# Patient Record
Sex: MALE | Race: WHITE | NOT HISPANIC OR LATINO | Employment: OTHER
[De-identification: names, ages, dates, MRNs, and addresses within clinical notes are randomized per-mention and may not be internally consistent; named-entity substitution may affect disease eponyms.]

---

## 2021-09-23 ENCOUNTER — TRANSCRIBE ORDERS (OUTPATIENT)
Dept: OTHER | Age: 76
End: 2021-09-23

## 2021-09-23 DIAGNOSIS — G93.0 SUBARACHNOID CYST: Primary | ICD-10-CM

## 2021-09-28 ENCOUNTER — TELEPHONE (OUTPATIENT)
Dept: NEUROSURGERY | Facility: CLINIC | Age: 76
End: 2021-09-28

## 2021-09-28 NOTE — TELEPHONE ENCOUNTER
[facility] Call Center    Phone Message    May a detailed message be left on voicemail: yes     Reason for Call: Appointment Intake    [facility]  Diagnosis and/or Symptoms: Subarachnoid cyst [G93.0]    Laureano Srivastava's nurse is following up on referral to see if its scheduled. Laureano states that images were pushed through to system and records were faxed to clinic.   Please contact patient for scheduling at [phone]    Action Taken: Message routed to:  [facility] ([facility]    Travel Screening: Not Applicable

## 2021-09-29 NOTE — TELEPHONE ENCOUNTER
Writer LEW for pt to call back and schedule a visit.    Please schedule a new, in person visit, with Dr. Malin or Dr. Luna for next available     Nieves damian

## 2021-09-30 ENCOUNTER — PRE VISIT (OUTPATIENT)
Dept: NEUROSURGERY | Facility: CLINIC | Age: 76
End: 2021-09-30

## 2021-09-30 NOTE — TELEPHONE ENCOUNTER
Action 9/30/21 HK 10:00AM   Action Taken Writer faxed request to [facility] at [phone] for all imaging and neuro records    Writer faxed request to [facility] at [phone]  requesting recent Mri and CT of head       Action 10.18.21 HK 8:00AM   Action Taken Writer confirmed imaging from [facility] has been pushed. Writer faxed second request to [facility] at [phone] requesting recent head CT

## 2021-10-26 ENCOUNTER — APPOINTMENT (OUTPATIENT)
Dept: MRI IMAGING | Facility: CLINIC | Age: 76
DRG: 065 | End: 2021-10-26
Attending: EMERGENCY MEDICINE
Payer: MEDICARE

## 2021-10-26 ENCOUNTER — APPOINTMENT (OUTPATIENT)
Dept: ULTRASOUND IMAGING | Facility: CLINIC | Age: 76
DRG: 065 | End: 2021-10-26
Attending: EMERGENCY MEDICINE
Payer: MEDICARE

## 2021-10-26 ENCOUNTER — HOSPITAL ENCOUNTER (INPATIENT)
Facility: CLINIC | Age: 76
LOS: 2 days | Discharge: NURSING, REHAB OR LONG-TERM CARE | DRG: 065 | End: 2021-10-29
Attending: EMERGENCY MEDICINE | Admitting: PSYCHIATRY & NEUROLOGY
Payer: MEDICARE

## 2021-10-26 ENCOUNTER — OFFICE VISIT (OUTPATIENT)
Dept: NEUROSURGERY | Facility: CLINIC | Age: 76
DRG: 065 | End: 2021-10-26
Attending: FAMILY MEDICINE
Payer: MEDICARE

## 2021-10-26 VITALS
WEIGHT: 189.15 LBS | HEIGHT: 69 IN | DIASTOLIC BLOOD PRESSURE: 94 MMHG | SYSTOLIC BLOOD PRESSURE: 147 MMHG | BODY MASS INDEX: 28.02 KG/M2 | RESPIRATION RATE: 24 BRPM | HEART RATE: 104 BPM | TEMPERATURE: 97.8 F

## 2021-10-26 DIAGNOSIS — R26.9 GAIT DIFFICULTY: ICD-10-CM

## 2021-10-26 DIAGNOSIS — G93.0 SUBARACHNOID CYST: ICD-10-CM

## 2021-10-26 DIAGNOSIS — R53.1 ACUTE WEAKNESS: Primary | ICD-10-CM

## 2021-10-26 DIAGNOSIS — I63.9 CEREBROVASCULAR ACCIDENT (CVA), UNSPECIFIED MECHANISM (H): Primary | ICD-10-CM

## 2021-10-26 DIAGNOSIS — R53.1 WEAKNESS: ICD-10-CM

## 2021-10-26 DIAGNOSIS — Z11.52 ENCOUNTER FOR SCREENING LABORATORY TESTING FOR SEVERE ACUTE RESPIRATORY SYNDROME CORONAVIRUS 2 (SARS-COV-2): ICD-10-CM

## 2021-10-26 LAB
ALBUMIN UR-MCNC: 10 MG/DL
ANION GAP SERPL CALCULATED.3IONS-SCNC: 6 MMOL/L (ref 3–14)
APPEARANCE UR: CLEAR
BACTERIA #/AREA URNS HPF: ABNORMAL /HPF
BASOPHILS # BLD AUTO: 0.1 10E3/UL (ref 0–0.2)
BASOPHILS NFR BLD AUTO: 1 %
BILIRUB UR QL STRIP: NEGATIVE
BUN SERPL-MCNC: 16 MG/DL (ref 7–30)
CALCIUM SERPL-MCNC: 9 MG/DL (ref 8.5–10.1)
CHLORIDE BLD-SCNC: 105 MMOL/L (ref 94–109)
CO2 SERPL-SCNC: 26 MMOL/L (ref 20–32)
COLOR UR AUTO: ABNORMAL
CREAT SERPL-MCNC: 0.86 MG/DL (ref 0.66–1.25)
EOSINOPHIL # BLD AUTO: 0.6 10E3/UL (ref 0–0.7)
EOSINOPHIL NFR BLD AUTO: 7 %
ERYTHROCYTE [DISTWIDTH] IN BLOOD BY AUTOMATED COUNT: 12.2 % (ref 10–15)
GFR SERPL CREATININE-BSD FRML MDRD: 83 ML/MIN/1.73M2
GLUCOSE BLD-MCNC: 76 MG/DL (ref 70–99)
GLUCOSE UR STRIP-MCNC: 500 MG/DL
HCT VFR BLD AUTO: 46.5 % (ref 40–53)
HGB BLD-MCNC: 15.7 G/DL (ref 13.3–17.7)
HGB UR QL STRIP: NEGATIVE
HOLD SPECIMEN: NORMAL
IMM GRANULOCYTES # BLD: 0 10E3/UL
IMM GRANULOCYTES NFR BLD: 0 %
KETONES UR STRIP-MCNC: ABNORMAL MG/DL
LEUKOCYTE ESTERASE UR QL STRIP: NEGATIVE
LYMPHOCYTES # BLD AUTO: 2.1 10E3/UL (ref 0.8–5.3)
LYMPHOCYTES NFR BLD AUTO: 25 %
MCH RBC QN AUTO: 30.9 PG (ref 26.5–33)
MCHC RBC AUTO-ENTMCNC: 33.8 G/DL (ref 31.5–36.5)
MCV RBC AUTO: 92 FL (ref 78–100)
MONOCYTES # BLD AUTO: 0.9 10E3/UL (ref 0–1.3)
MONOCYTES NFR BLD AUTO: 11 %
MUCOUS THREADS #/AREA URNS LPF: PRESENT /LPF
NEUTROPHILS # BLD AUTO: 4.7 10E3/UL (ref 1.6–8.3)
NEUTROPHILS NFR BLD AUTO: 56 %
NITRATE UR QL: NEGATIVE
NRBC # BLD AUTO: 0 10E3/UL
NRBC BLD AUTO-RTO: 0 /100
PH UR STRIP: 5 [PH] (ref 5–7)
PLATELET # BLD AUTO: 169 10E3/UL (ref 150–450)
POTASSIUM BLD-SCNC: 4.1 MMOL/L (ref 3.4–5.3)
RBC # BLD AUTO: 5.08 10E6/UL (ref 4.4–5.9)
RBC URINE: <1 /HPF
SODIUM SERPL-SCNC: 137 MMOL/L (ref 133–144)
SP GR UR STRIP: 1.02 (ref 1–1.03)
UROBILINOGEN UR STRIP-MCNC: NORMAL MG/DL
WBC # BLD AUTO: 8.4 10E3/UL (ref 4–11)
WBC URINE: 1 /HPF

## 2021-10-26 PROCEDURE — 72146 MRI CHEST SPINE W/O DYE: CPT | Mod: MG

## 2021-10-26 PROCEDURE — 84484 ASSAY OF TROPONIN QUANT: CPT | Performed by: STUDENT IN AN ORGANIZED HEALTH CARE EDUCATION/TRAINING PROGRAM

## 2021-10-26 PROCEDURE — 81001 URINALYSIS AUTO W/SCOPE: CPT | Performed by: EMERGENCY MEDICINE

## 2021-10-26 PROCEDURE — 85025 COMPLETE CBC W/AUTO DIFF WBC: CPT | Performed by: EMERGENCY MEDICINE

## 2021-10-26 PROCEDURE — 82248 BILIRUBIN DIRECT: CPT | Performed by: STUDENT IN AN ORGANIZED HEALTH CARE EDUCATION/TRAINING PROGRAM

## 2021-10-26 PROCEDURE — 93922 UPR/L XTREMITY ART 2 LEVELS: CPT | Mod: 26 | Performed by: RADIOLOGY

## 2021-10-26 PROCEDURE — 96372 THER/PROPH/DIAG INJ SC/IM: CPT

## 2021-10-26 PROCEDURE — 72141 MRI NECK SPINE W/O DYE: CPT

## 2021-10-26 PROCEDURE — C9803 HOPD COVID-19 SPEC COLLECT: HCPCS | Performed by: EMERGENCY MEDICINE

## 2021-10-26 PROCEDURE — 72148 MRI LUMBAR SPINE W/O DYE: CPT

## 2021-10-26 PROCEDURE — G1004 CDSM NDSC: HCPCS | Mod: GC | Performed by: STUDENT IN AN ORGANIZED HEALTH CARE EDUCATION/TRAINING PROGRAM

## 2021-10-26 PROCEDURE — 84155 ASSAY OF PROTEIN SERUM: CPT

## 2021-10-26 PROCEDURE — 80061 LIPID PANEL: CPT | Performed by: STUDENT IN AN ORGANIZED HEALTH CARE EDUCATION/TRAINING PROGRAM

## 2021-10-26 PROCEDURE — 72146 MRI CHEST SPINE W/O DYE: CPT | Mod: 26 | Performed by: STUDENT IN AN ORGANIZED HEALTH CARE EDUCATION/TRAINING PROGRAM

## 2021-10-26 PROCEDURE — 72148 MRI LUMBAR SPINE W/O DYE: CPT | Mod: 26 | Performed by: STUDENT IN AN ORGANIZED HEALTH CARE EDUCATION/TRAINING PROGRAM

## 2021-10-26 PROCEDURE — 36415 COLL VENOUS BLD VENIPUNCTURE: CPT | Performed by: EMERGENCY MEDICINE

## 2021-10-26 PROCEDURE — 93922 UPR/L XTREMITY ART 2 LEVELS: CPT

## 2021-10-26 PROCEDURE — 72141 MRI NECK SPINE W/O DYE: CPT | Mod: 26 | Performed by: STUDENT IN AN ORGANIZED HEALTH CARE EDUCATION/TRAINING PROGRAM

## 2021-10-26 PROCEDURE — G0463 HOSPITAL OUTPT CLINIC VISIT: HCPCS

## 2021-10-26 PROCEDURE — 80048 BASIC METABOLIC PNL TOTAL CA: CPT | Performed by: EMERGENCY MEDICINE

## 2021-10-26 PROCEDURE — 84165 PROTEIN E-PHORESIS SERUM: CPT | Mod: TC

## 2021-10-26 PROCEDURE — 99204 OFFICE O/P NEW MOD 45 MIN: CPT | Performed by: NEUROLOGICAL SURGERY

## 2021-10-26 PROCEDURE — 83036 HEMOGLOBIN GLYCOSYLATED A1C: CPT | Performed by: STUDENT IN AN ORGANIZED HEALTH CARE EDUCATION/TRAINING PROGRAM

## 2021-10-26 PROCEDURE — 83735 ASSAY OF MAGNESIUM: CPT | Performed by: STUDENT IN AN ORGANIZED HEALTH CARE EDUCATION/TRAINING PROGRAM

## 2021-10-26 PROCEDURE — 99285 EMERGENCY DEPT VISIT HI MDM: CPT | Mod: 25 | Performed by: EMERGENCY MEDICINE

## 2021-10-26 PROCEDURE — G1004 CDSM NDSC: HCPCS

## 2021-10-26 RX ORDER — ATORVASTATIN CALCIUM 10 MG/1
10 TABLET, FILM COATED ORAL AT BEDTIME
Status: ON HOLD | COMMUNITY
End: 2021-10-29

## 2021-10-26 RX ORDER — ASPIRIN 81 MG/1
81 TABLET, CHEWABLE ORAL DAILY
Status: ON HOLD | COMMUNITY
End: 2021-10-29

## 2021-10-26 ASSESSMENT — ENCOUNTER SYMPTOMS
FEVER: 0
WEAKNESS: 1
HEADACHES: 0
SHORTNESS OF BREATH: 0
EYE REDNESS: 0
CONFUSION: 0
NUMBNESS: 0
ABDOMINAL PAIN: 0
DIFFICULTY URINATING: 0
COLOR CHANGE: 0
ARTHRALGIAS: 0
NECK STIFFNESS: 0

## 2021-10-26 ASSESSMENT — PAIN SCALES - GENERAL: PAINLEVEL: NO PAIN (0)

## 2021-10-26 ASSESSMENT — MIFFLIN-ST. JEOR: SCORE: 1560.5

## 2021-10-26 NOTE — CONSULTS
[facility]       NEUROSURGERY CONSULTATION NOTE    This consultation was requested by Dr. Gutierrez from the ED service.    Reason for Consultation: Lower extremity weakness    HPI: Jose Mallory is a 76 year old right-handed male with a PMH of DMII, on ASA 81mg (last took yesterday night), who presents with a 1 year history of progressive gait difficulty, acutely worsened in the past 2 months. He describes difficulty with coordination with ambulation for the past year, which required use of a cane. This has progressed in the past 2 months with several falls and he is now using a walker. He presented to Neurosurgery clinic with Dr. Luna today in a wheelchair, for assessment of posterior fossa arachnoid cyst. He was sent to the ED to obtain further spine imaging given his progressive weakness with ambulation. He endorses chronic low back pain that has not changed in intensity recently. He denies any shooting pain down his legs, denies any numbness. He denies any urinary or stool incontinence or saddle anesthesia. He does endorse erectile dysfunction for the past 2 years. He describes his feet feel cold for the past year.       PAST MEDICAL HISTORY: No past medical history on file.    PAST SURGICAL HISTORY: No past surgical history on file.    FAMILY HISTORY: No family history on file.    SOCIAL HISTORY:   Social History     Tobacco Use     Smoking status: Never Smoker     Smokeless tobacco: Never Used   Substance Use Topics     Alcohol use: Not on file       MEDICATIONS:  (Not in a hospital admission)      Allergies:  Allergies   Allergen Reactions     Rocephin [Ceftriaxone]      Passing out.       ROS: 10 point ROS of systems including Constitutional, Eyes, Respiratory, Cardiovascular, Gastroenterology, Genitourinary, Integumentary, Muscularskeletal, Psychiatric were all negative except for pertinent positives noted in my HPI.    Physical exam:   Blood pressure  (!) 141/91, pulse 94, temperature 97.9  F (36.6  C), temperature source Oral, resp. rate 18, SpO2 96 %.  CV: BP and HR as noted above  PULM: breathing comfortably on room air  ABD: soft  NEUROLOGIC:  -- Awake; Alert; oriented x 3  -- Follows commands briskly  -- +repetition, calculation, and naming  -- Speech fluent, spontaneous. No aphasia or dysarthria.  -- no gaze preference. No apparent hemineglect.  Cranial Nerves:  -- visual fields full to confrontation, PERRL 3-2mm bilat and brisk, extraocular movements intact  -- face symmetrical, tongue midline  -- sensory V1-V3 intact bilaterally  -- palate elevates symmetrically, uvula midline  -- hearing grossly intact bilat  -- Trapezii 5/5 strength bilat symmetric  -- Cerebellar: Finger nose finger without dysmetria, intact rapid alternating motions bilaterally    Motor:  Normal bulk / tone; no tremor, rigidity, or bradykinesia.  No muscle wasting or fasciculations  No Pronator Drift     Delt Bi Tri Hand Flexion/  Extension Iliopsoas Quadriceps Hamstrings Tibialis Anterior Gastroc    C5 C6 C7 C8/T1 L2 L3 L4-S1 L4 S1   R 5 5 5 5 5 5 5 5 5   L 5 5 5 5 5 5 5 5 5   Sensory:  intact to LT x 4 extremities     Reflexes: No clonus     Bi Tri BR Alex Pat Ach Bab    C5-6 C7-8 C6 UMN L2-4 S1 UMN   R 2+ 2+ 2+ Norm 0* 2+ Norm   L 2+ 2+ 2+ Norm 0* 2+ Norm   * Absent reflexes in the setting of bilateral knee replacement    Gait: Deferred.       LABS:  Last Comprehensive Metabolic Panel:  Sodium   Date Value Ref Range Status   10/26/2021 137 133 - 144 mmol/L Final     Potassium   Date Value Ref Range Status   10/26/2021 4.1 3.4 - 5.3 mmol/L Final     Chloride   Date Value Ref Range Status   10/26/2021 105 94 - 109 mmol/L Final     Carbon Dioxide (CO2)   Date Value Ref Range Status   10/26/2021 26 20 - 32 mmol/L Final     Anion Gap   Date Value Ref Range Status   10/26/2021 6 3 - 14 mmol/L Final     Glucose   Date Value Ref Range Status   10/26/2021 76 70 - 99 mg/dL Final     Urea  Nitrogen   Date Value Ref Range Status   10/26/2021 16 7 - 30 mg/dL Final     Creatinine   Date Value Ref Range Status   10/26/2021 0.86 0.66 - 1.25 mg/dL Final     GFR Estimate   Date Value Ref Range Status   10/26/2021 83 >60 mL/min/1.73m2 Final     Comment:     As of July 11, 2021, eGFR is calculated by the CKD-EPI creatinine equation, without race adjustment. eGFR can be influenced by muscle mass, exercise, and diet. The reported eGFR is an estimation only and is only applicable if the renal function is stable.     Calcium   Date Value Ref Range Status   10/26/2021 9.0 8.5 - 10.1 mg/dL Final     No results found for: WBC  No results found for: RBC  No results found for: HGB  No results found for: HCT  No results found for: MCV  No results found for: MCH  No results found for: MCHC  No results found for: RDW  No results found for: PLT          IMAGING:  None available yet    ASSESSMENT: 75 yo with progressive gait difficulty, concerning for claudication vs. pseudoclaudicaiton      In addition to the assessment of diagnoses detailed above, this 76 year old male  patient admitted from the Emergency Department has the following conditions contributing to the complexity of their medical care:    Diabetes mellitus II      RECOMMENDATIONS:  No neurosurgical intervention indicated at this time   MRI full spine  SAVANAH      The patient was discussed with Dr. Chapman, neurosurgery chief resident and they agree with the above.    Kaylee Matthew MD  Neurosurgery Resident, PGY-2

## 2021-10-26 NOTE — ED PROVIDER NOTES
"    [facility] EMERGENCY DEPARTMENT ([facility])  10/26/21  History     Chief Complaint   Patient presents with     Extremity Weakness     The history is provided by the patient and medical records.     Jose Mallory is a 76 year old male with a past medical history significant for chronic ILD, diabetes mellitus, hypertension, hyperlipidemia, and GERD who presents to the Emergency Department for evaluation of progressive weakness of the lower extremities.  Patient reports that he had a neurosurgery appointment today to discuss some progressive weakness over the last couple of weeks.  Patient states that his care team referred him here to the [facility] ED for MRI of known stenosis.  He reports that over the last couple of weeks, he has been going \"downhill\" in terms of his motor function.  He states that he now requires assistance with simple tasks like getting dressed, getting out of chairs, and walking.  He reports he was able to walk with a cane approximately 1 month ago without much difficulty.  He does report multiple falls  over the past couple of months, however, none reported recently.  Patient denies any numbness in the lower extremities.  He does report some baseline difficulty using the restroom however, states this has been unchanged for him.      Past Medical History  No past medical history on file.  No past surgical history on file.  No current outpatient medications on file.    Allergies   Allergen Reactions     Rocephin [Ceftriaxone]      Passing out.     Family History  No family history on file.  Social History   Social History     Tobacco Use     Smoking status: Never Smoker     Smokeless tobacco: Never Used   Substance Use Topics     Alcohol use: Not on file     Drug use: Not on file      Past medical history, past surgical history, medications, allergies, family history, and social history were reviewed with the patient. No additional pertinent items.     I have reviewed the Medications, " Allergies, Past Medical and Surgical History, and Social History in the Epic system.    Review of Systems   Constitutional: Negative for fever.   HENT: Negative for congestion.    Eyes: Negative for redness.   Respiratory: Negative for shortness of breath.    Cardiovascular: Negative for chest pain.   Gastrointestinal: Negative for abdominal pain.   Genitourinary: Negative for difficulty urinating.   Musculoskeletal: Positive for gait problem. Negative for arthralgias and neck stiffness.   Skin: Negative for color change.   Neurological: Positive for weakness (lower extremity). Negative for numbness and headaches.   Psychiatric/Behavioral: Negative for confusion.   All other systems reviewed and are negative.      Physical Exam   BP: (!) 141/91  Pulse: 94  Temp: 97.9  F (36.6  C)  Resp: 18  Weight: 85.7 kg (189 lb)  SpO2: 96 %      Physical Exam  Vitals and nursing note reviewed.   Constitutional:       General: He is not in acute distress.  HENT:      Head: Atraumatic.      Mouth/Throat:      Mouth: Mucous membranes are moist.   Eyes:      Extraocular Movements: Extraocular movements intact.      Pupils: Pupils are equal, round, and reactive to light.   Cardiovascular:      Rate and Rhythm: Normal rate and regular rhythm.      Pulses: Normal pulses.      Heart sounds: Normal heart sounds.   Pulmonary:      Effort: Pulmonary effort is normal.      Breath sounds: Normal breath sounds.   Abdominal:      Palpations: Abdomen is soft.   Musculoskeletal:      Cervical back: Neck supple.   Neurological:      Mental Status: He is alert.      GCS: GCS eye subscore is 4. GCS verbal subscore is 5. GCS motor subscore is 6.      Cranial Nerves: Cranial nerves are intact.      Motor: Weakness present.      Coordination: Finger-Nose-Finger Test abnormal.      Comments: Profound instability, unable to take 1 step without risk of falling.  Minimal if any motor weakness.         ED Course     At 3:09 PM the patient was seen and  examined by Marlo Gutierrez MD in Room EDVTC.        Procedures             Neurology consulted, MRI ordered      No results found for this or any previous visit (from the past 24 hour(s)).  Medications   gadobutrol (GADAVIST) injection 8.5 mL (8.5 mLs Intravenous Given 10/27/21 0316)   aspirin (ASA) chewable tablet 81 mg (81 mg Oral Given 10/27/21 0440)   clopidogrel (PLAVIX) tablet 300 mg (300 mg Oral Given 10/27/21 0604)   perflutren diluted 1mL to 2mL with saline (OPTISON) diluted injection 6 mL (6 mLs Intravenous Given 10/27/21 1039)             Assessments & Plan (with Medical Decision Making)   Gentleman with progressive gait instability to the point now of inability to stand and ambulate.  Initial concern was for motor weakness.  Patient was signed out with the MRI pending retrospectively I see that it was positive for stroke and the patient was admitted to the neurology service.  This was unexpected.  He is otherwise stable no evidence for infection no history of trauma.  He will need PT and OT.    I have reviewed the nursing notes.    I have reviewed the findings, diagnosis, plan and need for follow up with the patient.    Discharge Medication List as of 10/29/2021 11:08 AM      START taking these medications    Details   clopidogrel (PLAVIX) 75 MG tablet 1 tablet (75 mg) by Oral or NG Tube route daily, Disp-90 tablet, R-0, Transitional             Final diagnoses:   Weakness   Gait difficulty   Cerebrovascular accident (CVA), unspecified mechanism (H)       I, Mina Pollard am serving as a trained medical scribe to document services personally performed by Marlo Gutierrez MD, based on the provider's statements to me.      I, Marlo Gutierrez MD, was physically present and have reviewed and verified the accuracy of this note documented by Mina Pollard.     Marlo Gutierrez MD  10/26/2021   [facility] EMERGENCY DEPARTMENT     Marlo Gutierrez  MD Oscar  11/19/21 3132

## 2021-10-26 NOTE — LETTER
Transition Communication Hand-off for Care Transitions to Next Level of Care Provider    Name: Jose Mallory  : 1945  MRN #: [id number]  Primary Care Provider: DANA ROMAN     Primary Clinic: [address] [address]     Reason for Hospitalization:  Gait difficulty [R26.9]  Weakness [R53.1]  Cerebrovascular accident (CVA), unspecified mechanism (H) [I63.9]  Admit Date/Time: 10/26/2021  2:51 PM  Discharge Date:   10/29/2021  Payor Source: Payor: MEDICARE / Plan: MEDICARE / Product Type: Medicare /              Reason for Communication Hand-off Referral:   Notification of the discharge plan    Discharge Plan:     Care Management Initial Consult     General Information  Assessment completed with:  (Patient and wife),  (Patient and wife)  Type of CM/SW Visit: Initial Assessment     Primary Care Provider verified and updated as needed: Yes   Readmission within the last 30 days: no previous admission in last 30 days      Reason for Consult:  (initial/stroke assessment)  Advance Care Planning: Advance Care Planning Reviewed:  (Patient does not have a health care directive)           Communication Assessment  Patient's communication style: spoken language (English or Bilingual)    Hearing Difficulty or Deaf: no   Wear Glasses or Blind: no     Cognitive  Cognitive/Neuro/Behavioral: WDL        Orientation: oriented x 4     Best Language: 0 - No aphasia  Speech: clear, spontaneous, logical     Living Environment:   People in home:  (Patient and wife)     Current living Arrangements: house      Able to return to prior arrangements: yes        Family/Social Support:  Care provided by: spouse/significant other  Provides care for: no one  Marital Status:   Wife, Children   (Dominguezkatiaoc)       Description of Support System: Supportive    Support Assessment: Adequate family and caregiver support     Current Resources:   Patient receiving home care services: No     Community  Resources:  (Disability parking certificate)  Equipment currently used at home:  (owns:  Cane, urinal, heightend toilet, hh shower nozzle)  Supplies currently used at home:       Employment/Financial:  Employment Status: retired     Employment/ Comments:  (Patient did not serve in the )  Financial Concerns: No concerns identified   Referral to Financial Counselor: No        Lifestyle & Psychosocial Needs:  Social Determinants of Health          Tobacco Use: Low Risk      Smoking Tobacco Use: Never Smoker     Smokeless Tobacco Use: Never Used   Alcohol Use:      Frequency of Alcohol Consumption:      Average Number of Drinks:      Frequency of Binge Drinking:    Financial Resource Strain:      Difficulty of Paying Living Expenses:    Food Insecurity:      Worried About Running Out of Food in the Last Year:      Ran Out of Food in the Last Year:    Transportation Needs:      Lack of Transportation (Medical):      Lack of Transportation (Non-Medical):    Physical Activity:      Days of Exercise per Week:      Minutes of Exercise per Session:    Stress:      Feeling of Stress :    Social Connections:      Frequency of Communication with Friends and Family:      Frequency of Social Gatherings with Friends and Family:      Attends Alevism Services:      Active Member of Clubs or Organizations:      Attends Club or Organization Meetings:      Marital Status:    Intimate Partner Violence:      Fear of Current or Ex-Partner:      Emotionally Abused:      Physically Abused:      Sexually Abused:    Depression: Not at risk     PHQ-2 Score: 1   Housing Stability:      Unable to Pay for Housing in the Last Year:      Number of Places Lived in the Last Year:      Unstable Housing in the Last Year:          Functional Status:  Prior to admission patient needed assistance:   Dependent ADLs::  (Indep with mobility and adl's 4 weeks ago)  Dependent IADLs::  (wife completes IADL's)        Mental Health  Status:  Mental Health Status:  (no past/present MI history)        Chemical Dependency Status:  Chemical Dependency Status:  (No past/present CD history)              Values/Beliefs:  Spiritual, Cultural Beliefs, Muslim Practices, Values that affect care: yes  Description of Beliefs that Will Affect Care: Presbytarian        Values/Beliefs Comment:  ([employer])     Additional Information:  SW met with pt and wife to complete initial assessment.  Pt lives with his wife [place] in [place].  There are 2 steps (with bilateral handrails) to enter the single family home.  Pt's bed, bath (tub/shower combo and walk in shower), and laundry are on the main floor.  Pt and wife indicate that 4 weeks ago, pt was indep with all mobility (no assistive device, 3 falls in the past year without fracture) and adl's.  Both state that pt has been gradually declining in the past 4 weeks and wife has started to need to provide assistance.  Pt and wife state that they learned that pt was having strokes that they were not aware of.  Pt's wife completes the housekeeping, laundry, cooking and shopping tasks.  Pt was indep with medication administration and household financial mgnt.  Pt drives.  Pt owns a cane, heightened toilet, urinal and a hh shower nozzle that is placed in the tub/shower combo.  Pt has a handicapped parking certificate.  Pt was not utilizing add'l community resources.  Pt does not have a known .  Pt's income consists of Social Security.  Pt did not serve in the .  Pt indicates that his [employer] kaylee is important to him.  Pt's primary care physician is Dr. Pedro Srivastava who offices at [facility] in [facility].   Pt has not had add'l hospitalizations in the past 30 days.   Pt indicates that he does not have past/present history of MI/CD.   Pt support system includes:  - Wife (Dianna), who is retired  - Adult children all of whom reside in [place].  Pt states that he received the  Moderna vaccine.     Disposition planning was discussed.  PMR is recommending ARU placement and per Dr. Leroy, pt is medically ready for discharge.   provided pt with a medicare care compare list and pt identified the following facility preferences (in order of preference):     1.  [facility], SW phoned Admissions (Girish [phone]) and per discussion, they have no openings before 11/1/2021  2.  [facility], SW phoned Admissions (Frida [phone]) who indicates that they are full until week of 11/1/2021  3.  [facility], SW phoned Admissions (Abigail [phone]) who indicates that they can accept pt for admit on 10/29/2021.     SW will coordinate discharge.     BARBARA Salamanca  Social Work, 6A  Phone:  [phone]  Pager:  [phone]  10/28/2021         Care Management Discharge Note     Discharge Date: 10/29/2021      Discharge Disposition:  [facility] ([phone])     Discharge Services:  Acute rehab placement at [facility]     Discharge DME:  Not applicable     Discharge Transportation:  [facility] ([email or web address] [phone]) to provide w/c transport at 11:15am     Private pay costs discussed:  Not applicable     PAS Confirmation Code:  Not required as pt is admitting to ARU setting  Patient/family educated on Medicare website which has current facility and service quality ratings: yes (Provided Medicare Care Compare list)     Education Provided on the Discharge Plan:  yes  Persons Notified of Discharge Plans: pt, wife, 6A nursing and Dr. Leroy  Patient/Family in Agreement with the Plan: yes     Handoff Referral Completed: Yes     Additional Information:  - Dr Leroy has confirmed readiness for discharge  - Admissions (Abigail) at [facility] has confirmed acceptance for Admissions  -  provided a [facility] brochure to pt's wife  - pt's wife is currently staying locally at [facility] but plans to return to [place] during pt's rehab stay.  Family will  plan to transport pt to home from ARU when pt is medically ready for discharge.     BARBARA Salamanca  Social Work, 6A  Phone:  [phone]  Pager:  [phone]  10/28/2021

## 2021-10-26 NOTE — ED TRIAGE NOTES
"Pt presents via wheelchair with his son with ongoing concerns of lower extremity weakness and fatigue x6wks. Pt states he has been \"shuffling\" for years but over the past 6 weeks he's lost function of his legs and is unable to move them or walk as he used to. Pt saw a neuro doctor who was concerned for spinal stenosis who then sent him to the ER for evaluation. Pt is VSS in triage.  "

## 2021-10-27 ENCOUNTER — APPOINTMENT (OUTPATIENT)
Dept: PHYSICAL THERAPY | Facility: CLINIC | Age: 76
DRG: 065 | End: 2021-10-27
Attending: STUDENT IN AN ORGANIZED HEALTH CARE EDUCATION/TRAINING PROGRAM
Payer: MEDICARE

## 2021-10-27 ENCOUNTER — APPOINTMENT (OUTPATIENT)
Dept: CARDIOLOGY | Facility: CLINIC | Age: 76
DRG: 065 | End: 2021-10-27
Attending: STUDENT IN AN ORGANIZED HEALTH CARE EDUCATION/TRAINING PROGRAM
Payer: MEDICARE

## 2021-10-27 ENCOUNTER — APPOINTMENT (OUTPATIENT)
Dept: MRI IMAGING | Facility: CLINIC | Age: 76
DRG: 065 | End: 2021-10-27
Attending: EMERGENCY MEDICINE
Payer: MEDICARE

## 2021-10-27 ENCOUNTER — APPOINTMENT (OUTPATIENT)
Dept: OCCUPATIONAL THERAPY | Facility: CLINIC | Age: 76
DRG: 065 | End: 2021-10-27
Attending: STUDENT IN AN ORGANIZED HEALTH CARE EDUCATION/TRAINING PROGRAM
Payer: MEDICARE

## 2021-10-27 ENCOUNTER — APPOINTMENT (OUTPATIENT)
Dept: SPEECH THERAPY | Facility: CLINIC | Age: 76
DRG: 065 | End: 2021-10-27
Attending: STUDENT IN AN ORGANIZED HEALTH CARE EDUCATION/TRAINING PROGRAM
Payer: MEDICARE

## 2021-10-27 PROBLEM — R53.1 WEAKNESS: Status: ACTIVE | Noted: 2021-10-27

## 2021-10-27 PROBLEM — R26.9 GAIT DIFFICULTY: Status: ACTIVE | Noted: 2021-10-27

## 2021-10-27 LAB
ALBUMIN SERPL-MCNC: 3.6 G/DL (ref 3.4–5)
ALP SERPL-CCNC: 70 U/L (ref 40–150)
ALT SERPL W P-5'-P-CCNC: 22 U/L (ref 0–70)
ANION GAP SERPL CALCULATED.3IONS-SCNC: 13 MMOL/L (ref 3–14)
AST SERPL W P-5'-P-CCNC: 14 U/L (ref 0–45)
BILIRUB DIRECT SERPL-MCNC: <0.1 MG/DL (ref 0–0.2)
BILIRUB SERPL-MCNC: 0.4 MG/DL (ref 0.2–1.3)
BUN SERPL-MCNC: 14 MG/DL (ref 7–30)
CALCIUM SERPL-MCNC: 9.2 MG/DL (ref 8.5–10.1)
CHLORIDE BLD-SCNC: 104 MMOL/L (ref 94–109)
CHOLEST SERPL-MCNC: 127 MG/DL
CO2 SERPL-SCNC: 19 MMOL/L (ref 20–32)
CREAT SERPL-MCNC: 0.86 MG/DL (ref 0.66–1.25)
CREAT SERPL-MCNC: 0.86 MG/DL (ref 0.66–1.25)
CRP SERPL-MCNC: 12 MG/L (ref 0–8)
ERYTHROCYTE [DISTWIDTH] IN BLOOD BY AUTOMATED COUNT: 12.4 % (ref 10–15)
ERYTHROCYTE [SEDIMENTATION RATE] IN BLOOD BY WESTERGREN METHOD: 12 MM/HR (ref 0–20)
FOLATE SERPL-MCNC: 16 NG/ML
GFR SERPL CREATININE-BSD FRML MDRD: 83 ML/MIN/1.73M2
GFR SERPL CREATININE-BSD FRML MDRD: 83 ML/MIN/1.73M2
GLUCOSE BLD-MCNC: 182 MG/DL (ref 70–99)
GLUCOSE BLDC GLUCOMTR-MCNC: 138 MG/DL (ref 70–99)
GLUCOSE BLDC GLUCOMTR-MCNC: 153 MG/DL (ref 70–99)
GLUCOSE BLDC GLUCOMTR-MCNC: 215 MG/DL (ref 70–99)
HBA1C MFR BLD: 6.3 % (ref 0–5.6)
HCT VFR BLD AUTO: 42.6 % (ref 40–53)
HDLC SERPL-MCNC: 31 MG/DL
HGB BLD-MCNC: 14.1 G/DL (ref 13.3–17.7)
HOLD SPECIMEN: NORMAL
LDLC SERPL CALC-MCNC: 70 MG/DL
LVEF ECHO: NORMAL
MAGNESIUM SERPL-MCNC: 1.7 MG/DL (ref 1.6–2.3)
MAGNESIUM SERPL-MCNC: 1.8 MG/DL (ref 1.6–2.3)
MCH RBC QN AUTO: 30.7 PG (ref 26.5–33)
MCHC RBC AUTO-ENTMCNC: 33.1 G/DL (ref 31.5–36.5)
MCV RBC AUTO: 93 FL (ref 78–100)
NONHDLC SERPL-MCNC: 96 MG/DL
PHOSPHATE SERPL-MCNC: 3.4 MG/DL (ref 2.5–4.5)
PLATELET # BLD AUTO: 144 10E3/UL (ref 150–450)
POTASSIUM BLD-SCNC: 4 MMOL/L (ref 3.4–5.3)
POTASSIUM BLD-SCNC: 4.2 MMOL/L (ref 3.4–5.3)
PROT SERPL-MCNC: 7.7 G/DL (ref 6.8–8.8)
RADIOLOGIST FLAGS: ABNORMAL
RBC # BLD AUTO: 4.6 10E6/UL (ref 4.4–5.9)
SARS-COV-2 RNA RESP QL NAA+PROBE: NEGATIVE
SODIUM SERPL-SCNC: 136 MMOL/L (ref 133–144)
TRIGL SERPL-MCNC: 128 MG/DL
TROPONIN I SERPL-MCNC: <0.015 UG/L (ref 0–0.04)
VIT B12 SERPL-MCNC: 744 PG/ML (ref 193–986)
WBC # BLD AUTO: 7.3 10E3/UL (ref 4–11)

## 2021-10-27 PROCEDURE — 85014 HEMATOCRIT: CPT

## 2021-10-27 PROCEDURE — 255N000002 HC RX 255 OP 636: Performed by: EMERGENCY MEDICINE

## 2021-10-27 PROCEDURE — 93306 TTE W/DOPPLER COMPLETE: CPT | Mod: 26 | Performed by: INTERNAL MEDICINE

## 2021-10-27 PROCEDURE — 120N000002 HC R&B MED SURG/OB UMMC

## 2021-10-27 PROCEDURE — A9585 GADOBUTROL INJECTION: HCPCS | Performed by: EMERGENCY MEDICINE

## 2021-10-27 PROCEDURE — 99223 1ST HOSP IP/OBS HIGH 75: CPT | Mod: AI | Performed by: PSYCHIATRY & NEUROLOGY

## 2021-10-27 PROCEDURE — 36415 COLL VENOUS BLD VENIPUNCTURE: CPT

## 2021-10-27 PROCEDURE — 84207 ASSAY OF VITAMIN B-6: CPT

## 2021-10-27 PROCEDURE — 255N000002 HC RX 255 OP 636: Performed by: INTERNAL MEDICINE

## 2021-10-27 PROCEDURE — 84132 ASSAY OF SERUM POTASSIUM: CPT

## 2021-10-27 PROCEDURE — 70544 MR ANGIOGRAPHY HEAD W/O DYE: CPT | Mod: 26 | Performed by: RADIOLOGY

## 2021-10-27 PROCEDURE — 250N000013 HC RX MED GY IP 250 OP 250 PS 637: Performed by: STUDENT IN AN ORGANIZED HEALTH CARE EDUCATION/TRAINING PROGRAM

## 2021-10-27 PROCEDURE — 83735 ASSAY OF MAGNESIUM: CPT

## 2021-10-27 PROCEDURE — 36415 COLL VENOUS BLD VENIPUNCTURE: CPT | Performed by: STUDENT IN AN ORGANIZED HEALTH CARE EDUCATION/TRAINING PROGRAM

## 2021-10-27 PROCEDURE — 82607 VITAMIN B-12: CPT

## 2021-10-27 PROCEDURE — 82746 ASSAY OF FOLIC ACID SERUM: CPT

## 2021-10-27 PROCEDURE — 92610 EVALUATE SWALLOWING FUNCTION: CPT | Mod: GN

## 2021-10-27 PROCEDURE — 97535 SELF CARE MNGMENT TRAINING: CPT | Mod: GO | Performed by: OCCUPATIONAL THERAPIST

## 2021-10-27 PROCEDURE — 70549 MR ANGIOGRAPH NECK W/O&W/DYE: CPT | Mod: 26 | Performed by: RADIOLOGY

## 2021-10-27 PROCEDURE — 250N000013 HC RX MED GY IP 250 OP 250 PS 637: Performed by: EMERGENCY MEDICINE

## 2021-10-27 PROCEDURE — 97162 PT EVAL MOD COMPLEX 30 MIN: CPT | Mod: GP

## 2021-10-27 PROCEDURE — 83921 ORGANIC ACID SINGLE QUANT: CPT

## 2021-10-27 PROCEDURE — 84446 ASSAY OF VITAMIN E: CPT

## 2021-10-27 PROCEDURE — 86038 ANTINUCLEAR ANTIBODIES: CPT

## 2021-10-27 PROCEDURE — 92526 ORAL FUNCTION THERAPY: CPT | Mod: GN

## 2021-10-27 PROCEDURE — 80048 BASIC METABOLIC PNL TOTAL CA: CPT | Performed by: STUDENT IN AN ORGANIZED HEALTH CARE EDUCATION/TRAINING PROGRAM

## 2021-10-27 PROCEDURE — 82525 ASSAY OF COPPER: CPT

## 2021-10-27 PROCEDURE — 84630 ASSAY OF ZINC: CPT

## 2021-10-27 PROCEDURE — 70553 MRI BRAIN STEM W/O & W/DYE: CPT | Mod: 26 | Performed by: RADIOLOGY

## 2021-10-27 PROCEDURE — U0005 INFEC AGEN DETEC AMPLI PROBE: HCPCS | Performed by: EMERGENCY MEDICINE

## 2021-10-27 PROCEDURE — 85652 RBC SED RATE AUTOMATED: CPT

## 2021-10-27 PROCEDURE — 86140 C-REACTIVE PROTEIN: CPT

## 2021-10-27 PROCEDURE — 99223 1ST HOSP IP/OBS HIGH 75: CPT | Mod: GC | Performed by: PHYSICAL MEDICINE & REHABILITATION

## 2021-10-27 PROCEDURE — 70549 MR ANGIOGRAPH NECK W/O&W/DYE: CPT | Mod: MG

## 2021-10-27 PROCEDURE — 84100 ASSAY OF PHOSPHORUS: CPT

## 2021-10-27 PROCEDURE — 97166 OT EVAL MOD COMPLEX 45 MIN: CPT | Mod: GO | Performed by: OCCUPATIONAL THERAPIST

## 2021-10-27 PROCEDURE — 999N000208 ECHOCARDIOGRAM COMPLETE

## 2021-10-27 PROCEDURE — 97530 THERAPEUTIC ACTIVITIES: CPT | Mod: GP

## 2021-10-27 PROCEDURE — G1004 CDSM NDSC: HCPCS | Performed by: RADIOLOGY

## 2021-10-27 PROCEDURE — 70553 MRI BRAIN STEM W/O & W/DYE: CPT | Mod: MG

## 2021-10-27 PROCEDURE — 97116 GAIT TRAINING THERAPY: CPT | Mod: GP

## 2021-10-27 PROCEDURE — 250N000011 HC RX IP 250 OP 636: Performed by: STUDENT IN AN ORGANIZED HEALTH CARE EDUCATION/TRAINING PROGRAM

## 2021-10-27 RX ORDER — GADOBUTROL 604.72 MG/ML
8.5 INJECTION INTRAVENOUS ONCE
Status: COMPLETED | OUTPATIENT
Start: 2021-10-27 | End: 2021-10-27

## 2021-10-27 RX ORDER — ASPIRIN 81 MG/1
81 TABLET, CHEWABLE ORAL ONCE
Status: COMPLETED | OUTPATIENT
Start: 2021-10-27 | End: 2021-10-27

## 2021-10-27 RX ORDER — DEXTROSE MONOHYDRATE 25 G/50ML
25-50 INJECTION, SOLUTION INTRAVENOUS
Status: DISCONTINUED | OUTPATIENT
Start: 2021-10-27 | End: 2021-10-29 | Stop reason: HOSPADM

## 2021-10-27 RX ORDER — NICOTINE POLACRILEX 4 MG
15-30 LOZENGE BUCCAL
Status: DISCONTINUED | OUTPATIENT
Start: 2021-10-27 | End: 2021-10-29 | Stop reason: HOSPADM

## 2021-10-27 RX ORDER — ASPIRIN 81 MG/1
324 TABLET, CHEWABLE ORAL DAILY
Status: DISCONTINUED | OUTPATIENT
Start: 2021-10-28 | End: 2021-10-29 | Stop reason: HOSPADM

## 2021-10-27 RX ORDER — PROCHLORPERAZINE 25 MG
12.5 SUPPOSITORY, RECTAL RECTAL EVERY 12 HOURS PRN
Status: DISCONTINUED | OUTPATIENT
Start: 2021-10-27 | End: 2021-10-29 | Stop reason: HOSPADM

## 2021-10-27 RX ORDER — ENALAPRIL MALEATE 5 MG/1
5 TABLET ORAL DAILY
Status: DISCONTINUED | OUTPATIENT
Start: 2021-10-27 | End: 2021-10-29 | Stop reason: HOSPADM

## 2021-10-27 RX ORDER — ONDANSETRON 2 MG/ML
4 INJECTION INTRAMUSCULAR; INTRAVENOUS EVERY 6 HOURS PRN
Status: DISCONTINUED | OUTPATIENT
Start: 2021-10-27 | End: 2021-10-29 | Stop reason: HOSPADM

## 2021-10-27 RX ORDER — LIDOCAINE 40 MG/G
CREAM TOPICAL
Status: DISCONTINUED | OUTPATIENT
Start: 2021-10-27 | End: 2021-10-29 | Stop reason: HOSPADM

## 2021-10-27 RX ORDER — HYDROCHLOROTHIAZIDE 12.5 MG/1
12.5 TABLET ORAL DAILY
Status: DISCONTINUED | OUTPATIENT
Start: 2021-10-27 | End: 2021-10-28

## 2021-10-27 RX ORDER — CLOPIDOGREL BISULFATE 75 MG/1
75 TABLET ORAL DAILY
Status: DISCONTINUED | OUTPATIENT
Start: 2021-10-28 | End: 2021-10-29 | Stop reason: HOSPADM

## 2021-10-27 RX ORDER — CLOPIDOGREL BISULFATE 75 MG/1
300 TABLET ORAL ONCE
Status: COMPLETED | OUTPATIENT
Start: 2021-10-27 | End: 2021-10-27

## 2021-10-27 RX ORDER — GLIPIZIDE 10 MG/1
10 TABLET, FILM COATED, EXTENDED RELEASE ORAL DAILY
Status: ON HOLD | COMMUNITY
Start: 2021-07-16 | End: 2021-11-23

## 2021-10-27 RX ORDER — ASPIRIN 81 MG/1
81 TABLET, CHEWABLE ORAL DAILY
Status: DISCONTINUED | OUTPATIENT
Start: 2021-10-27 | End: 2021-10-27

## 2021-10-27 RX ORDER — ENALAPRIL MALEATE AND HYDROCHLOROTHIAZIDE 5; 12.5 MG/1; MG/1
TABLET ORAL DAILY
Status: DISCONTINUED | OUTPATIENT
Start: 2021-10-27 | End: 2021-10-27 | Stop reason: CLARIF

## 2021-10-27 RX ORDER — PROCHLORPERAZINE MALEATE 5 MG
5 TABLET ORAL EVERY 6 HOURS PRN
Status: DISCONTINUED | OUTPATIENT
Start: 2021-10-27 | End: 2021-10-29 | Stop reason: HOSPADM

## 2021-10-27 RX ORDER — ATORVASTATIN CALCIUM 80 MG/1
80 TABLET, FILM COATED ORAL EVERY EVENING
Status: DISCONTINUED | OUTPATIENT
Start: 2021-10-27 | End: 2021-10-28

## 2021-10-27 RX ORDER — ONDANSETRON 4 MG/1
4 TABLET, ORALLY DISINTEGRATING ORAL EVERY 6 HOURS PRN
Status: DISCONTINUED | OUTPATIENT
Start: 2021-10-27 | End: 2021-10-29 | Stop reason: HOSPADM

## 2021-10-27 RX ORDER — ENALAPRIL MALEATE 10 MG/1
10 TABLET ORAL DAILY
Status: ON HOLD | COMMUNITY
Start: 2021-09-01 | End: 2021-10-29

## 2021-10-27 RX ORDER — ACETAMINOPHEN 325 MG/1
650 TABLET ORAL EVERY 4 HOURS PRN
Status: DISCONTINUED | OUTPATIENT
Start: 2021-10-27 | End: 2021-10-29 | Stop reason: HOSPADM

## 2021-10-27 RX ADMIN — METFORMIN HYDROCHLORIDE 1000 MG: 500 TABLET ORAL at 07:25

## 2021-10-27 RX ADMIN — CLOPIDOGREL BISULFATE 300 MG: 75 TABLET ORAL at 06:04

## 2021-10-27 RX ADMIN — METFORMIN HYDROCHLORIDE 1000 MG: 500 TABLET ORAL at 18:00

## 2021-10-27 RX ADMIN — HUMAN ALBUMIN MICROSPHERES AND PERFLUTREN 6 ML: 10; .22 INJECTION, SOLUTION INTRAVENOUS at 10:39

## 2021-10-27 RX ADMIN — ATORVASTATIN CALCIUM 80 MG: 80 TABLET, FILM COATED ORAL at 19:53

## 2021-10-27 RX ADMIN — HYDROCHLOROTHIAZIDE 12.5 MG: 12.5 TABLET ORAL at 07:25

## 2021-10-27 RX ADMIN — ASPIRIN 81 MG CHEWABLE TABLET 81 MG: 81 TABLET CHEWABLE at 07:25

## 2021-10-27 RX ADMIN — ENOXAPARIN SODIUM 40 MG: 40 INJECTION SUBCUTANEOUS at 07:25

## 2021-10-27 RX ADMIN — GADOBUTROL 8.5 ML: 604.72 INJECTION INTRAVENOUS at 03:16

## 2021-10-27 RX ADMIN — ASPIRIN 81 MG CHEWABLE TABLET 81 MG: 81 TABLET CHEWABLE at 04:40

## 2021-10-27 RX ADMIN — ENALAPRIL MALEATE 5 MG: 5 TABLET ORAL at 07:25

## 2021-10-27 ASSESSMENT — ACTIVITIES OF DAILY LIVING (ADL)
ADLS_ACUITY_SCORE: 6
ADLS_ACUITY_SCORE: 9
ADLS_ACUITY_SCORE: 6
ADLS_ACUITY_SCORE: 9
ADLS_ACUITY_SCORE: 10
ADLS_ACUITY_SCORE: 9
ADLS_ACUITY_SCORE: 10
ADLS_ACUITY_SCORE: 9
ADLS_ACUITY_SCORE: 9
ADLS_ACUITY_SCORE: 10

## 2021-10-27 ASSESSMENT — VISUAL ACUITY
OU: NORMAL ACUITY
OU: NORMAL ACUITY

## 2021-10-27 NOTE — PHARMACY-CONSULT NOTE
Pharmacy Consult to evaluate for medication related stroke core measures    Jose Mallory, 76 year old male admitted for subacute L pontine stroke on 10/26/2021.    Thrombolytic was not given because of Time from onset contraindications    VTE Prophylaxis Enoxaparin given on 10/27/21 as appropriate prior to end of hospital day 2.    Antithrombotic: aspirin and clopidogrel started on 10/27/21, as appropriate by end of hospital day 2. Continue antithrombotic therapy on discharge to meet quality measures, unless contraindicated.    Anticoagulation if history of A-fib/flutter: Patient does not have history of A-fib/flutter - anticoagulation not required for medication related stroke core measures.     LDL Cholesterol Calculated   Date Value Ref Range Status   10/26/2021 70 <=100 mg/dL Final       Patient currently receiving Lipitor (atorvastatin) continue statin on discharge to meet quality measures, unless contraindicated.    Recommendations: None at this time    Thank you for the consult.    Lora Ureña [facility] 10/27/2021 1:37 PM

## 2021-10-27 NOTE — PROGRESS NOTES
Neurosurgery brief interval note:    MRI with moderate canal stenosis with disc herniation most pronounced at C3-C4, C4-5. Otherwise no significant areas of canal stenosis.     - Recommend admission to obs  - PT/OT  - Neurology evaluation for gait instability    Neurosurgery will follow peripherally    Edgar Sanchez MD  Neurosurgery Resident, PGY-2

## 2021-10-27 NOTE — PROGRESS NOTES
"   10/27/21 0900   General Information   Onset of Illness/Injury or Date of Surgery 10/26/21   Referring Physician Nasreen Christy MD   Patient/Family Therapy Goal Statement (SLP) Pt wants to eat and drink   Pertinent History of Current Problem Mr. Jose Mallory is a 76 year old man with a history of DMII, HLD, cerebellar arachnoid cyst, chronic lacunes, peripheral neuropathy, bilateral knee replacement who has had approximately 2 years of slowly progressive gait instability.  He has had several falls during this time.  Per neuro team, pt has a subacute left pontine infarct that is likely the etiology of his recent severe gait instability and left sided numbness. Clinical swallow assessment completed per MD order at 0820 on 10/27/21.   General Observations Pt awake and alert, fluent speech without evidence of aphasia or dysarthria   Past History of Dysphagia No hx of oropharyngeal dysphagia per pt report or chart review.  Per pt and RN, pt tolerating current diet w/o difficulty.  Pt did report he had \"his throat stretched\" about 40 years ago and some esophageal dysphagia prior to this dilation, but this has not been an issue after dilation.   Pain Assessment   Patient Currently in Pain No   Type of Evaluation   Type of Evaluation Swallow Evaluation   Oral Motor   Oral Musculature generally intact   Dentition (Oral Motor)   Dentition (Oral Motor) natural dentition;adequate dentition   Facial Symmetry (Oral Motor)   Facial Symmetry (Oral Motor) WNL   Lip Function (Oral Motor)   Lip Range of Motion (Oral Motor) WNL   Tongue Function (Oral Motor)   Tongue ROM (Oral Motor) WNL   Jaw Function (Oral Motor)   Jaw Function (Oral Motor) WNL   Cough/Swallow/Gag Reflex (Oral Motor)   Volitional Throat Clear/Cough (Oral Motor) WNL   Volitional Swallow (Oral Motor) WNL   Vocal Quality/Secretion Management (Oral Motor)   Vocal Quality (Oral Motor) WNL   Secretion Management (Oral Motor) WNL   General Swallowing " Observations   Current Diet/Method of Nutritional Intake (General Swallowing Observations, NIS) regular diet;thin liquids (level 0)   Respiratory Support (General Swallowing Observations) none   Swallowing Evaluation Clinical swallow evaluation   Clinical Swallow Evaluation   Feeding Assistance set up only required   Clinical Swallow Evaluation Textures Trialed thin liquids;solid foods   Clinical Swallow Eval: Thin Liquid Texture Trial   Mode of Presentation, Thin Liquids cup;straw;fed by clinician;self-fed   Volume of Liquid or Food Presented 4 oz   Oral Phase of Swallow WFL   Pharyngeal Phase of Swallow intact   Diagnostic Statement No overt s/sx of aspiration, oral phase WFL   Clinical Swallow Evaluation: Solid Food Texture Trial   Mode of Presentation self-fed   Volume Presented 1 cracker   Oral Phase WFL   Pharyngeal Phase intact   Diagnostic Statement No overt s/sx of aspiration, oral phase WFL   Esophageal Phase of Swallow   Patient reports or presents with symptoms of esophageal dysphagia No   Swallowing Recommendations   Diet Consistency Recommendations thin liquids (level 0);regular diet   Supervision Level for Intake distant supervision needed   Mode of Delivery Recommendations slow rate of intake   Recommended Feeding/Eating Techniques (Swallow Eval) maintain upright posture during/after eating for 30 minutes   Instrumental Assessment Recommendations reassess via non-instrumental clinical swallow evaluation   General Therapy Interventions   Planned Therapy Interventions Dysphagia Treatment   Dysphagia treatment Instruction of safe swallow strategies   SLP Therapy Assessment/Plan   Criteria for Skilled Therapeutic Interventions Met (SLP Eval) yes;treatment indicated   SLP Diagnosis Functional oropharyngeal swallowing mechanism   Rehab Potential (SLP Eval) good, to achieve stated therapy goals   Therapy Frequency (SLP Eval) 3 times/wk   Predicted Duration of Therapy Intervention (SLP Eval) 1 week    Comment, Therapy Assessment/Plan (SLP) Clinical swallow assessment completed per MD order.  Pt presents with a functional oropharyngeal swallowing mechanism in the setting of subacute L pontine stroke.  Recommend continuation of regular diet and thin liquids.  Hold PO with changes to pt's respiratory status.  Ensure pt is fully alert and upright for all PO, given small bites/sips, alternating bites/sips.  Pt awake and alert, no aphasia or dysarthria present during informal observation.  Oral mech unremarkable.  No overt s/sx of aspiration with thin via cup/straw, solid texture.  Oral phase WFL.  Pt following safe swallow precautions with min cues.  SLP for short course to ensure PO tolerance, anticipate pt will meet SLP goals prior to discharge.   Therapy Plan Review/Discharge Plan (SLP)   Therapy Plan Review (SLP) evaluation/treatment results reviewed;care plan/treatment goals reviewed;risks/benefits reviewed;current/potential barriers reviewed;participants voiced agreement with care plan;participants included;patient   Demonstrates Need for Referral to Another Service (SLP) physical therapist;occupational therapist   SLP Discharge Planning    SLP Discharge Recommendation (DC Rec) home  (defer to PT/OT)   SLP Rationale for DC Rec Functional oropharyngeal swallowing mechanism, SLP to ensure PO tolerance   SLP Brief overview of current status   Recommend continuation of regular diet and thin liquids.  Hold PO with changes to pt's respiratory status.  Ensure pt is fully alert and upright for all PO, given small bites/sips, alternating bites/sips.  SLP for short course to ensure PO tolerance, anticipate pt will meet SLP goals prior to discharge.    Total Evaluation Time   Total Evaluation Time (Minutes) 11

## 2021-10-27 NOTE — H&P
"[facility]    Stroke Admission Note    Chief Complaint  \"weakness\"    HPI  Mr. Jose Mallory is a 76 year old man with a history of DMII, HLD, cerebellar arachnoid cyst, chronic lacunes, peripheral neuropathy, bilateral knee replacement who has had approximately 2 years of slowly progressive gait instability.  He has had several falls during this time.  Describes being \"off balance\".  In the past week or so instability has gotten much worse per patient and family.  His wife and son are present at bedside to contribute to history.  He had previously been seen by a neurologist for ruleout of Parkinson's disease per review of outside records, at that time he was not believed to meet criteria for Parkinson's disease or displays significant parkinsonism.  He was in the [facility] for a neurosurgery clinic appointment on 10/26, when due to concern for progressive severe gait instability he was sent to [facility] emergency department.  Cervical thoracic and lumbar spine were scanned, without significant stenosis or clear etiology for gait instability.  Neurology was subsequently consulted for weakness.     He notes that for the past week he has been unable to walk without a walker or heavy assist from family.  He is unable to  the shower, and has use a shower chair.  Difficulty rising out of chairs or from a seated position.  This is much worse from his previous gait status.  He endorses a long history of decreased sense of smell.  Does not endorse acting out his dreams or any abnormal sleep behaviors.  Does endorse a few months of constipation.  Does not endorse any tremor.  Either hearing or smell endorse a history of cognitive impairment including frequent repeating, forgetting unfamiliar faces or places.    On my exam patient has significantly decreased vibratory sensation up to right mid thigh and left hip.  He additionally has increased sensitivity over his " bilateral distal lower extremities to pinprick and light touch.  He has dysmetria appreciated in both hands and both legs.  He has decreased sensation on his left hemibody including face compared to his right hemibody.  He has severe truncal instability and cannot sit up on his own.  He is able to stand with assist of 2 and is very unsteady and unable to take even one step.  Unable to stand with his feet together.  This exam was concerning for stroke.  Given timeframe he was not within a window of acute intervention or benefit of lowering risk of bleed.  Recommended ED obtain MRI brain stroke series.    MRI brain demonstrated DWI with diffusion restriction in left cookie with corresponding ADC change, flair hyperintensity noted in same area as well as minimal contrast enhancement.  This would be consistent with a subacute infarct.  Per review of previous work-up there is a report of a previous MRI that describes a small 4 mm area of brainstem diffusion restriction, however it does not appear consistent with the current findings on this imaging.  Unable to compare imaging directly as not available in the system at this time.      TPA Treatment   Not given due to unclear or unfavorable risk-benefit profile for extended window thrombolysis beyond the conventional 4.5 hour time window.    Endovascular Treatment  Not initiated due to absence of proximal vessel occlusion    Impression   #Subacute L pontine stroke  #Decreased vibration sensation bilaterally in lower extremities, peripheral neuropathy  #gait instability  #arachnoid cyst    Mr. Mallory has a subacute left pontine infarct that is likely the etiology of his recent severe gait instability and left sided numbness. 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 Loaded with plavix with plan for DAPT.    Plan  #Subacute L Pontine small vessel stroke  - Admit to  Neurology  - Neurochecks Q 4 hours  - Avoid hypotonic IV fluids  - Daily aspirin 81 mg for secondary stroke prevention  - Plavix (clopidogrel) 300 mg PO loading dose x 1  - Plavix (clopidogrel) 75 mg PO Daily  - Statin: atorvastatin 80mg  - TTE with Bubble Study  - Telemetry, EKG  - Bedside Glucose Monitoring  - Nutrition: general diet once passed bedside swallow  - A1c, Lipid Panel, Troponin x 3  - PT/OT/SLP  - PM&R  - Stroke Education  - Depression Screen  - Apnea Screen  - Euthermia, Euglycemia    #HTN  -pta enalapril/HCTZ (at 5/12.5 however), as outside of permissive htn window at presentation.    #DMII  -pta metformin    #decreased vibration  #increased distal pinprick sensitivity  -general neuropathy labs  -outpatient follow up with consideration of EMG      Prophylaxis            For VTE Prevention:  - heparin SQ    For Acid Suppression:  - GI prophylaxis is not indicated    Code Status  Full Code    During initial physical assessment, the plan of care was discussed and developed with patient, spouse and child.  Plan of care includes: MRI brain, labs..    Patient was admitted via [facility] ([place])    The patient will be admitted to the Neuro Critical Care/Stroke team..     The patient was discussed with Stroke Fellow, Dr. Irizarry.  The Stroke Staff is Dr. Euceda.    Nasreen Christy MD  Neurology Resident  *[phone]  ___________________________________________________    Nutrition:   Orders Placed This Encounter      Combination Diet    Clinically Significant Risk Factors Present on Admission             # Platelet Defect: home medication list includes an antiplatelet medication      Past Medical History   No past medical history on file. EMR reviewed. see HPI  Past Surgical History   No past surgical history on file. EMR reviewed. see HPI  Medications   Home Meds  Prior to Admission medications    Medication Sig Start Date End Date Taking? Authorizing Provider   aspirin (ASA) 81 MG chewable tablet  Take 81 mg by mouth daily    Reported, Patient   Atorvastatin Calcium (LIPITOR PO) 1 tablet daily, unsure of dose.    Reported, Patient   ENALAPRIL-HYDROCHLOROTHIAZIDE PO 1 tablet daily, unsure of dose.    Reported, Patient   metFORMIN (GLUCOPHAGE) 1000 MG tablet Take 1,000 mg by mouth 2 times daily (with meals)    Reported, Patient       Scheduled Meds    aspirin  81 mg Oral Once     clopidogrel  300 mg Oral Once           Allergies   Allergies   Allergen Reactions     Rocephin [Ceftriaxone]      Passing out.     Family History   No family history on file.  Social History   Social History     Tobacco Use     Smoking status: Never Smoker     Smokeless tobacco: Never Used   Substance Use Topics     Alcohol use: Not on file     Drug use: Not on file       Review of Systems   The 10 point Review of Systems is negative other than noted in the HPI or here.        PHYSICAL EXAMINATION  Temp:  [97.8  F (36.6  C)-97.9  F (36.6  C)] 97.9  F (36.6  C)  Pulse:  [] 97  Resp:  [18-24] 18  BP: (132-147)/(91-98) 132/98  SpO2:  [96 %] 96 %    Physical Exam:  Constitutional: Alert. Lying in bed comfortably. No acute distress.   Head: Atraumatic, normocephalic.  ENT: Moist mucous membranes. No sinus drainage.  Cardiovascular: Appears warm, well-perfused, and non-toxic.  Respiratory: No increased work of breathing, no accessory muscle use.   Gastrointestinal: Does not appear distended.     Neurologic Exam:   Mental Status: alert, oriented to name, age, month, year, president. Able to spell WORLD forward and backward. Able to perform simple calculations. 3/3 words correct on 5 minute recall. Speech is fluent, able to comprehend, and follows multistep commands. No dysarthria.  Cranial Nerves: pupils 2mm, equal and reactive to light, EOMI without nystagmus, eyes move conjugately. Smile and eyebrow raise equal, blinking symmetric, no weakness. Lashes are buried on eye squeeze. Nasolabial folds symmetric. Decreased sensation to  pinprick on left face, light touch symmetric. Tongue midline, full lateral motion. Shoulder shrug symmetric, hearing intact to conversation.  Motor: no atrophy or fasciculations observed. No pronator drift. Mild upper ext sensory drift. Strength is 5/5 at bilateral shoulder abductors, elbow flex/ext, wrist flex/ext, finger flex/ab/adductors, hip flex/ext/ab/adduction, knee flex/ext, dorsi/plantar flexion, and foot eversion/inversion. Finger tapping without decrement.  Reflexes: No ankle clonus. Absent ankle jerk bilaterally. Patellar reflex confounded by bilateral knee surgeries.Toes appear downgoing (exam limited by withdrawal and severe ticklishness).   Sensory: increased pain to pinprick distally in feet. Left side with decreased pinprick sensation compared to right in face/arm/leg. Vibration absent at bilateral hallux and patella. Vibration noted (2-3 seconds) on right femur at mid-thigh, not noted on left side until pelvis. Vibration >10 seconds at bilateral wrists. Proprioception with hesitation on answering, but able to answer position of finger and toes correctly.   Coordination: FNF bilateral dysmetria, HTS bilateral dysmetria.   Gait: truncal ataxia and difficulty maintaining sitting upright. Able to rise from sitting at edge of bed with assist of 2. Not able to stand with feet together. Wide based, unable to safely ambulate.     Dysphagia Screen  Per Nursing    Modified Carin Score (Pre-morbid)  2-Slight disability; unable to carry out all previous activities, but able to look after own affairs    Stroke Scales    NIHSS  Interval baseline (10/27/21 0435)   Interval Comments     1a. Level of Consciousness 0-->Alert, keenly responsive   1b. LOC Questions 0-->Answers both questions correctly   1c. LOC Commands 0-->Performs both tasks correctly   2.   Best Gaze 0-->Normal   3.   Visual 0-->No visual loss   4.   Facial Palsy 0-->Normal symmetrical movements   5a. Motor Arm, Left 0-->No drift, limb holds 90  (or 45) degrees for full 10 secs   5b. Motor Arm, Right 0-->No drift, limb holds 90 (or 45) degrees for full 10 secs   6a. Motor Leg, Left 0-->No drift, leg holds 30 degree position for full 5 secs   6b. Motor Leg, right 0-->No drift, leg holds 30 degree position for full 5 secs   7.   Limb Ataxia 2-->Present in two limbs   8.   Sensory 1-->Mild-to-moderate sensory loss, patient feels pinprick is less sharp or is dull on the affected side, or there is a loss of superficial pain with pinprick, but patient is aware of being touched   9.   Best Language 0-->No aphasia, normal   10. Dysarthria 0-->Normal   11. Extinction and Inattention  0-->No abnormality   Total 3 (10/27/21 1952)       Imaging  I personally reviewed all imaging; relevant findings per the HPI.    Lab Results Data   CBC  Recent Labs   Lab 10/26/21  1456   WBC 8.4   RBC 5.08   HGB 15.7   HCT 46.5        Basic Metabolic Panel   Recent Labs   Lab 10/26/21  1456      POTASSIUM 4.1   CHLORIDE 105   CO2 26   BUN 16   CR 0.86   GLC 76   LEON 9.0     Liver Panel  No results for input(s): PROTTOTAL, ALBUMIN, BILITOTAL, ALKPHOS, AST, ALT, BILIDIRECT in the last 168 hours.  INR  No lab results found.   Lipid Profile  No lab results found.  A1C  No lab results found.  Troponin I  No results for input(s): TROPONIN, GHTROP in the last 168 hours.       Stroke Code / Stroke Consult Data Data    Not a stroke code       This note was dictated with voice to text and may contain transcription errors.

## 2021-10-27 NOTE — PROGRESS NOTES
10/27/21 1256   Living Environment   People in home spouse   Current Living Arrangements house   Transportation Anticipated car, drives self;family or friend will provide   Living Environment Comments 3 RAJAT, all needs met on main floor, walk-in shower   Self-Care   Usual Activity Tolerance moderate   Current Activity Tolerance fair   Regular Exercise No   Equipment Currently Used at Home   (4WW, cane, lift chair, )   Activity/Exercise/Self-Care Comment Pt has had a 2 year period of declining activity tolerance and gait instability, which has worsened rapidly in the past week. He is normally I with all BADLs, drives, and can walk short distances. He was using a cane, but has needed 4WW more recently. Wife does most of the cooking, shopping, and cleaning. He does not use the basement due to difficulty with stairs.    Disability/Function   Fall history within last six months yes   Number of times patient has fallen within last six months 2  (1 LOB, 1 slip and fall backwards)   Change in Functional Status Since Onset of Current Illness/Injury yes   General Information   Referring Physician Nasreen Christy MD   Patient/Family Therapy Goal Statement (OT) Return to PLOF   Additional Occupational Profile Info/Pertinent History of Current Problem 76 year old man with a history of DMII, HLD, cerebellar arachnoid cyst, chronic lacunes, peripheral neuropathy, bilateral knee replacement who has had approximately 2 years of slowly progressive gait instability.   Existing Precautions/Restrictions fall   Cognitive Status Examination   Cognitive Status Comments Reports no issues currently or at baseline. No overt cognitive issues noted on eval, but will monitor.   Visual Perception   Visual Acuity No concerns, wears trifocals   Sensory   Sensory Comments Mild B LE neuropathy in feet   Range of Motion Comprehensive   Comment, General Range of Motion WFL B UEs   Strength Comprehensive (MMT)   Comment, General Manual Muscle  Testing (MMT) Assessment WFL B UEs (although slightly weaker on L)   Coordination   Gross Motor Coordination Very mild impairment in LUE   Clinical Impression   Criteria for Skilled Therapeutic Interventions Met (OT) yes   OT Diagnosis Decreased I in ADLs due to deconditioning   Assessment of Occupational Performance 3-5 Performance Deficits   Identified Performance Deficits dressing, bathing, toileting, functional endurance   Clinical Decision Making Complexity (OT) moderate complexity   Therapy Frequency (OT) 5x/week   Predicted Duration of Therapy 2 weeks   Anticipated Equipment Needs Upon Discharge (OT) shower chair   Risk & Benefits of therapy have been explained patient   Total Evaluation Time (Minutes)   Total Evaluation Time (Minutes) 10

## 2021-10-27 NOTE — PLAN OF CARE
Stroke Education Note    The following information has been reviewed with the patient    1. Warning signs of stroke    2. Calling 911 if having warning signs of stroke    3. All modifiable risk factors: hypertension, CAD, atrial fib, diabetes, hypercholesterolemia, smoking, substance abuse, diet, physical inactivity, obesity, sleep apnea    4. Patient's risk factors for stroke which include: HTN, HLD, age, minimal exercise, & diabetes.    5. Follow-up plan for after discharge    6. Discharge medications which include: NA      In addition, the above information was given to the patient in writing as a part of the Understanding Stroke Handbook.     Learner's response to risk factors / lifestyle modification education: Desire to change Committment to change Activation Taking steps     Daly Strickland RN

## 2021-10-27 NOTE — PLAN OF CARE
Successfully completed stroke teach-back, Session 1. Patient/family has the Stroke Handbook and/or handouts from the PLC stroke class.    Successfully completed stroke teach-back, Session 2.    Successfully completed stroke teach-back, Session 3.

## 2021-10-27 NOTE — PROGRESS NOTES
10/27/21 1528   Quick Adds   Type of Visit Initial PT Evaluation   Living Environment   People in home spouse   Current Living Arrangements house   Home Accessibility stairs to enter home   Number of Stairs, Main Entrance 2   Stair Railings, Main Entrance railings on both sides of stairs   Transportation Anticipated car, drives self;family or friend will provide   Living Environment Comments Pt lives with wife in [place] on farm, all needs met on main level.   Self-Care   Usual Activity Tolerance moderate   Current Activity Tolerance fair   Regular Exercise No   Equipment Currently Used at Home walker, rolling;cane, straight   Activity/Exercise/Self-Care Comment Pt has had 2 year period of declining activity tolerance and gait instability with further rapid decline in past 1-2 weeks. Pt has been using a 4WW for the past 2 weeks, was using a cane for 1 week prior to that. Before that, was independent with mobility and ADLs. Wife completes most of the cooking, shopping, and cleaning. Lives on a farm that his sons mostly manage at this time.   Disability/Function   Hearing Difficulty or Deaf no   Wear Glasses or Blind no   Concentrating, Remembering or Making Decisions Difficulty no   Difficulty Communicating no   Difficulty Eating/Swallowing no   Walking or Climbing Stairs Difficulty no   Dressing/Bathing Difficulty no   Toileting issues no   Doing Errands Independently Difficulty (such as shopping) no   Fall history within last six months yes   Number of times patient has fallen within last six months 3   Change in Functional Status Since Onset of Current Illness/Injury yes   General Information   Onset of Illness/Injury or Date of Surgery 10/26/21   Referring Physician Nasreen Christy MD   Patient/Family Therapy Goals Statement (PT) to return home   Pertinent History of Current Problem (include personal factors and/or comorbidities that impact the POC) Per chart, Mr. Jose Mallory is 76Y/M with PMH of  diabetes mellitus, hyperlipidemia, cerebellar arachnoid cyst, chronic lacune, peripheral neuropathy, bilateral knee replacement who was seen in neurosurgery clinic on 10/26/2021 for evaluation of gait difficulties from last 2 years which progressively got worse last 2 months.  Thoracic and lumbar spine MRI is negative for any acute pathology.  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  He was outside the TPA window and was managed medically.   Existing Precautions/Restrictions fall   General Observations verbal order from MD, will place formal activity orders in chart   Cognition   Orientation Status (Cognition) oriented x 4   Affect/Mental Status (Cognition) WFL   Follows Commands (Cognition) WFL   Posture    Posture Forward head position;Protracted shoulders   Range of Motion (ROM)   ROM Quick Adds ROM WFL   Strength   Manual Muscle Testing Quick Adds Strength WFL   Bed Mobility   Comment (Bed Mobility) Raphael supine>sit, initial R lateral lean. Also note frequent posterior trunk lean in unsupported sitting.   Transfers   Transfer Safety Comments CGA sit<>stand with FWW   Gait/Stairs (Locomotion)   Comment (Gait/Stairs) Raphael and FWW; variable step length with occasional overshooting of steps followed by shuffling gait; flexed posture with reliance on FWW   Balance   Balance Comments SBA for static standing balance, Raphael for dynamic balance with FWW support   Sensory Examination   Sensory Perception patient reports no sensory changes   Coordination   Coordination Comments Impaired heel to shin (greater impairment L>R), impaired finger to nose with reduced velocity and overshooting   Clinical Impression   Criteria for Skilled Therapeutic Intervention yes, treatment indicated   PT Diagnosis (PT) impaired functional mobility   Influenced by the following impairments impaired balance and coordination, dysmetria, reduced activity tolerance   Functional limitations due to impairments pt requires assist for safe  functional mobility and is at increased risk for falls   Clinical Presentation Evolving/Changing   Clinical Presentation Rationale PMH and clinical judgment   Clinical Decision Making (Complexity) moderate complexity   Therapy Frequency (PT) Daily   Predicted Duration of Therapy Intervention (days/wks) 1 week   Planned Therapy Interventions (PT) bed mobility training;balance training;gait training;home exercise program;neuromuscular re-education;stair training;strengthening;transfer training;progressive activity/exercise;risk factor education;home program guidelines   Anticipated Equipment Needs at Discharge (PT)   (TBD)   Risk & Benefits of therapy have been explained evaluation/treatment results reviewed;care plan/treatment goals reviewed;risks/benefits reviewed;current/potential barriers reviewed;participants voiced agreement with care plan;participants included;patient;spouse/significant other   PT Discharge Planning    PT Discharge Recommendation (DC Rec) [facility] patient will benefit from intensive, interdisciplinary therapy.  Anticipate will be able to tolerate 3 hours of therapy per day;home with assist;home with home care physical therapy   PT Rationale for DC Rec Pt presents below functional baseline with several deficits post-CVA including impaired balance and coordination, dysmetria, and reduced activity tolerance all leading to high fall risk. Pt will benefit from intensive interdisciplinary rehab to maximize functional independence and return to PLOF. Pt is motivated and has good support of family. If pt were to return home, would require assist of 1 for all mobility and home therapies.   PT Brief overview of current status  Assist of 1 and FWW   Total Evaluation Time   Total Evaluation Time (Minutes) 10

## 2021-10-27 NOTE — PLAN OF CARE
Pt is  here  for  concern for progressive severe gait instability.   #Subacute L Pontine small vessel stroke.  A&Ox4.B/P runs high; pt is on B/P med.No change in neuro status.Uses urinal at bed side.Bs 138 before lunch; pt is on metformin.On telemetry; NSR.  Plan for transfer to  when bed is available.

## 2021-10-27 NOTE — ED NOTES
[facility]   ED Nurse to Floor Handoff     Jose Mallory is a 76 year old male who speaks English and lives with a spouse,  in a home  They arrived in the ED by ambulance from home    ED Chief Complaint: Extremity Weakness    ED Dx;   Final diagnoses:   Weakness   Gait difficulty   Cerebrovascular accident (CVA), unspecified mechanism (H)         Needed?: No    Allergies:   Allergies   Allergen Reactions     Rocephin [Ceftriaxone]      Passing out.   .  Past Medical Hx: No past medical history on file.   Baseline Mental status: WDL  Current Mental Status changes: at basesline    Infection present or suspected this encounter: no  Sepsis suspected: No  Isolation type: No active isolations  Patient tested for COVID 19 prior to admission: YES     Activity level - Baseline/Home:  Cane  Activity Level - Current:   Wheelchair    Bariatric equipment needed?: No    In the ED these meds were given:   Medications   atorvastatin (LIPITOR) tablet 80 mg (has no administration in time range)   metFORMIN (GLUCOPHAGE) tablet 1,000 mg (1,000 mg Oral Given 10/27/21 0725)   lidocaine 1 % 0.1-1 mL (has no administration in time range)   lidocaine (LMX4) cream (has no administration in time range)   sodium chloride (PF) 0.9% PF flush 3 mL ( Intracatheter Canceled Entry 10/27/21 1345)   sodium chloride (PF) 0.9% PF flush 3 mL (has no administration in time range)   Medication Instructions - Avoid dextrose in IV solutions. (has no administration in time range)   medication instruction - No oral meds if patient didn't pass dysphagia screen (has no administration in time range)   enoxaparin ANTICOAGULANT (LOVENOX) injection 40 mg (40 mg Subcutaneous Given 10/27/21 0725)   clopidogrel (PLAVIX) tablet 75 mg (has no administration in time range)   ondansetron (ZOFRAN-ODT) ODT tab 4 mg (has no administration in time range)     Or   ondansetron (ZOFRAN) injection 4 mg (has no  administration in time range)   prochlorperazine (COMPAZINE) injection 5 mg (has no administration in time range)     Or   prochlorperazine (COMPAZINE) tablet 5 mg (has no administration in time range)     Or   prochlorperazine (COMPAZINE) suppository 12.5 mg (has no administration in time range)   acetaminophen (TYLENOL) tablet 650 mg (has no administration in time range)   enalapril (VASOTEC) tablet 5 mg (5 mg Oral Given 10/27/21 0725)     And   hydrochlorothiazide (HYDRODIURIL) tablet 12.5 mg (12.5 mg Oral Given 10/27/21 0725)   sodium chloride (PF) 0.9% PF flush 6 mL (6 mLs Intravenous Not Given 10/27/21 1213)   aspirin (ASA) chewable tablet 324 mg (has no administration in time range)   gadobutrol (GADAVIST) injection 8.5 mL (8.5 mLs Intravenous Given 10/27/21 0316)   aspirin (ASA) chewable tablet 81 mg (81 mg Oral Given 10/27/21 0440)   clopidogrel (PLAVIX) tablet 300 mg (300 mg Oral Given 10/27/21 0604)   perflutren diluted 1mL to 2mL with saline (OPTISON) diluted injection 6 mL (6 mLs Intravenous Given 10/27/21 1039)       Drips running?  No    Home pump  No    Current LDAs  Peripheral IV 10/26/21 Anterior;Right Lower forearm (Active)   Site Assessment WDL 10/27/21 1200   Line Status Saline locked 10/27/21 1200   Phlebitis Scale 0-->no symptoms 10/27/21 1200   Infiltration Scale 0 10/27/21 1200   Number of days: 1       Labs results:   Labs Ordered and Resulted from Time of ED Arrival to Time of ED Departure   ROUTINE UA WITH MICROSCOPIC REFLEX TO CULTURE - Abnormal       Result Value    Color Urine Light Yellow      Appearance Urine Clear      Glucose Urine 500  (*)     Bilirubin Urine Negative      Ketones Urine Trace (*)     Specific Gravity Urine 1.025      Blood Urine Negative      pH Urine 5.0      Protein Albumin Urine 10  (*)     Urobilinogen Urine Normal      Nitrite Urine Negative      Leukocyte Esterase Urine Negative      Bacteria Urine Few (*)     Mucus Urine Present (*)     RBC Urine <1       WBC Urine 1     HEMOGLOBIN A1C - Abnormal    Hemoglobin A1C 6.3 (*)    LIPID PROFILE - Abnormal    Cholesterol 127      Triglycerides 128      Direct Measure HDL 31 (*)     LDL Cholesterol Calculated 70      Non HDL Cholesterol 96     GLUCOSE BY METER - Abnormal    GLUCOSE BY METER POCT 138 (*)    BASIC METABOLIC PANEL - Normal    Sodium 137      Potassium 4.1      Chloride 105      Carbon Dioxide (CO2) 26      Anion Gap 6      Urea Nitrogen 16      Creatinine 0.86      Calcium 9.0      Glucose 76      GFR Estimate 83     COVID-19 VIRUS (CORONAVIRUS) BY PCR - Normal    SARS CoV2 PCR Negative     TROPONIN I - Normal    Troponin I <0.015     HEPATIC FUNCTION PANEL - Normal    Bilirubin Total 0.4      Bilirubin Direct <0.1      Protein Total 7.7      Albumin 3.6      Alkaline Phosphatase 70      AST 14      ALT 22     POTASSIUM - Normal    Potassium 4.2     MAGNESIUM - Normal    Magnesium 1.8     VITAMIN B12 - Normal    Vitamin B12 744     FOLATE - Normal    Folic Acid 16.0     CBC WITH PLATELETS AND DIFFERENTIAL    WBC Count 8.4      RBC Count 5.08      Hemoglobin 15.7      Hematocrit 46.5      MCV 92      MCH 30.9      MCHC 33.8      RDW 12.2      Platelet Count 169      % Neutrophils 56      % Lymphocytes 25      % Monocytes 11      % Eosinophils 7      % Basophils 1      % Immature Granulocytes 0      NRBCs per 100 WBC 0      Absolute Neutrophils 4.7      Absolute Lymphocytes 2.1      Absolute Monocytes 0.9      Absolute Eosinophils 0.6      Absolute Basophils 0.1      Absolute Immature Granulocytes 0.0      Absolute NRBCs 0.0     GLUCOSE MONITOR NURSING POCT   GLUCOSE MONITOR NURSING POCT   GLUCOSE MONITOR NURSING POCT   GLUCOSE MONITOR NURSING POCT   GLUCOSE MONITOR NURSING POCT   METHYLMALONIC ACID   ZINC   COPPER LEVEL   HOMOCYSTEINE   ANTI NUCLEAR QUETA IGG BY IFA WITH REFLEX   VITAMIN B6   VITAMIN E       Imaging Studies:   Recent Results (from the past 24 hour(s))   US SAVANAH Doppler No Exercise    Narrative     SAVANAH 10/26/2021 4:37 PM    CLINICAL HISTORY: Claudication symptoms.     COMPARISONS: None available.    REFERRING PROVIDER: DEDRICK NAVARRETE JAMES    TECHNIQUE: Bilateral SAVANAH obtained. No TBI, VPR, or PPG obtained.    FINDINGS:  RIGHT:       Brachial: 146 mmHg       Ankle (PT): 196 mmHg       Ankle (DP): 197 mmHg         SAVANAH: 1.27    LEFT:       Brachial: 155 mmHg       Ankle (PT): 188 mmHg       Ankle (DP): 156 mmHg         SAVANAH: 1.21      Impression    IMPRESSION:  1. RIGHT:       A. Resting SAVANAH is normal, 1.27.    2. LEFT:       A. Resting SAVANAH is normal, 1.21.    Guidelines:    SAVANAH Diagnostic Criteria (Based on criteria published in Circulation  2011; 124: 1430-4446):    > 1.4: Non compressible    1.00 - 1.40: Normal    0.91 - 0.99: Borderline    At or below 0.90: Abnormal    SAVANAH Diagnostic Criteria (Based on ACC/AHA guideline 2008):    >/=1.3 - non compressible vessels    1.00  -1.29 - Normal    0.91 - 0.99 - Borderline    0.41 - 0.90 - Mild to moderate PAD    0.00 - 0.40 - Severe PAD    NYASIA ZAMORA MD         SYSTEM ID:  [id number]   MR Cervical Spine w/o Contrast    Narrative    MR CERVICAL SPINE W/O CONTRAST 10/26/2021 7:21 PM    Provided History: Lower extremity weakness.    Comparison: None    Technique: Sagittal T1-weighted, sagittal T2-weighted, sagittal STIR,  axial T2-weighted, and axial T2* gradient echo images of the cervical  spine were obtained without intravenous contrast.    Findings:  The cervical vertebrae are normally aligned.  There is mild multilevel  disc height narrowing from C3 through C7. Loss of intrinsic T2 signal  within the intervertebral discs. No myelopathic cord signal.  The  findings on a level by level basis are as follows:    C2-3:  No spinal canal or neural foraminal stenosis.    C3-4:  Disc osteophyte complex with resultant mild to moderate spinal  canal narrowing. Mild to moderate bilateral neural foraminal  narrowing. Uncinate hypertrophy.    C4-5:  Disc osteophyte complex  and thickening of the ligamentum  flavum. Resultant moderate spinal canal narrowing. Severe right neural  foraminal narrowing. Moderate left neuroforaminal narrowing. Uncinate  hypertrophy.    C5-6:  Posterior disc bulge. Mild to moderate spinal canal narrowing.  Moderate to severe right and moderate left neuroforaminal narrowing.  Uncinate hypertrophy.    C6-7:  Circumferential disc bulge. Mild spinal canal narrowing.  Bilateral moderate neural foraminal stenosis.    C7-T1:  Small circumferential disc bulge. No significant spinal canal  narrowing. Mild bilateral neuroforaminal narrowing. Uncinate  hypertrophy.     No abnormality of the paraspinous soft tissues.      Impression    Impression:   Multilevel degenerative changes of the cervical spine as described  above in great detail. Most significantly there is moderate spinal  canal stenosis at C4-5 secondary to disc osteophyte complex and  thickening of the ligamentum flavum. In addition, there is bilateral  neural foraminal stenosis, severe on the right and moderate to severe  on the left . No myelopathic cord signal.    I have personally reviewed the examination and initial interpretation  and I agree with the findings.    JUICE CADE MD         SYSTEM ID:  [id number]   MR Lumbar Spine w/o Contrast    Narrative    Thoracic and Lumbar spine MRI without contrast    History: Progressive lower extremity weakness.    Comparison: None    Technique:  Axial T2-weighted, and sagittal T1, STIR, and T2-weighted  images of the lumbar spine without intravenous contrast. Sagittal T1,  STIR, and T2-weighted images of the thoracic spine without contrast  were obtained, with axial T2-weighted images through levels of  interest in the thoracic spine.    Findings:  Thoracic Spine:  The external marker is at the level of  T5-6  .    There is no definite abnormal signal in the thoracic spinal cord at  any level. Alignment of the thoracic vertebra appears within normal  limits. Bone  marrow signal intensity on noncontrast images appears  unremarkable. Multilevel posterior disc bulge at T3-T5, T7-T9, and  T10-L1. No significant associated spinal canal or neuroforaminal  stenosis at any level. Mild multilevel degenerative changes of the  thoracic spine including facet hypertrophy and disc osteophyte  complexes as described above.    Lumbar Spine:  There are 5 type lumbar vertebra.  The tip of the conus is at  approximately L1-2. The alignment of the lumbar spine is unremarkable.    As far as the bone marrow signal intensity, no abnormality is noted.  Near-complete intervertebral disc height loss at L4-L5. Mild disc  height loss at L2-L4 and L5-S1.  On a level by level basis, the findings are as follows:    L1-2: Small posterior disc bulge. Mild intervertebral disc height  loss. No significant spinal canal or neuroforaminal narrowing. Facet  arthropathy.    L2-3:  Posterior disc osteophyte complex. Intervertebral disc height  loss. Mild spinal canal narrowing. Mild bilateral neuroforaminal  narrowing. Facet arthropathy.    L3-4:  Circumferential disc osteophyte complex. Ligamentum flavum  thickening. Moderate to severe spinal canal narrowing. Mild to  moderate bilateral neuroforaminal narrowing. Facet arthropathy.    L4-5:  Circumferential disc osteophyte complex. Mild spinal canal  narrowing. Ligamentum flavum hypertrophy. Mild to moderate bilateral  neuroforaminal narrowing. Facet arthropathy.    L5-S1:  Circumferential disc ossify complex. Bilateral mild to  moderate neural foraminal narrowing. No significant spinal canal  narrowing. Facet arthropathy. .    The visualized paraspinous tissues anteriorly are unremarkable.      Impression    Impression:   1. No definite abnormality within the thoracic spinal cord or  vertebra.   2. Multilevel degenerative changes of the lumbar spine most  significant at L3-L4 with moderate to severe spinal canal narrowing  and mild to moderate bilateral neural  foraminal narrowing.    I have personally reviewed the examination and initial interpretation  and I agree with the findings.    JUICE CADE MD         SYSTEM ID:  [id number]   MR Thoracic Spine w/o Contrast    Narrative    Thoracic and Lumbar spine MRI without contrast    History: Progressive lower extremity weakness.    Comparison: None    Technique:  Axial T2-weighted, and sagittal T1, STIR, and T2-weighted  images of the lumbar spine without intravenous contrast. Sagittal T1,  STIR, and T2-weighted images of the thoracic spine without contrast  were obtained, with axial T2-weighted images through levels of  interest in the thoracic spine.    Findings:  Thoracic Spine:  The external marker is at the level of  T5-6  .    There is no definite abnormal signal in the thoracic spinal cord at  any level. Alignment of the thoracic vertebra appears within normal  limits. Bone marrow signal intensity on noncontrast images appears  unremarkable. Multilevel posterior disc bulge at T3-T5, T7-T9, and  T10-L1. No significant associated spinal canal or neuroforaminal  stenosis at any level. Mild multilevel degenerative changes of the  thoracic spine including facet hypertrophy and disc osteophyte  complexes as described above.    Lumbar Spine:  There are 5 type lumbar vertebra.  The tip of the conus is at  approximately L1-2. The alignment of the lumbar spine is unremarkable.    As far as the bone marrow signal intensity, no abnormality is noted.  Near-complete intervertebral disc height loss at L4-L5. Mild disc  height loss at L2-L4 and L5-S1.  On a level by level basis, the findings are as follows:    L1-2: Small posterior disc bulge. Mild intervertebral disc height  loss. No significant spinal canal or neuroforaminal narrowing. Facet  arthropathy.    L2-3:  Posterior disc osteophyte complex. Intervertebral disc height  loss. Mild spinal canal narrowing. Mild bilateral neuroforaminal  narrowing. Facet arthropathy.    L3-4:   Circumferential disc osteophyte complex. Ligamentum flavum  thickening. Moderate to severe spinal canal narrowing. Mild to  moderate bilateral neuroforaminal narrowing. Facet arthropathy.    L4-5:  Circumferential disc osteophyte complex. Mild spinal canal  narrowing. Ligamentum flavum hypertrophy. Mild to moderate bilateral  neuroforaminal narrowing. Facet arthropathy.    L5-S1:  Circumferential disc ossify complex. Bilateral mild to  moderate neural foraminal narrowing. No significant spinal canal  narrowing. Facet arthropathy. .    The visualized paraspinous tissues anteriorly are unremarkable.      Impression    Impression:   1. No definite abnormality within the thoracic spinal cord or  vertebra.   2. Multilevel degenerative changes of the lumbar spine most  significant at L3-L4 with moderate to severe spinal canal narrowing  and mild to moderate bilateral neural foraminal narrowing.    I have personally reviewed the examination and initial interpretation  and I agree with the findings.    JUICE CADE MD         SYSTEM ID:  [id number]   MR Brain for Stroke Complete   Result Value    Radiologist flags (Urgent)     Small zone of subacute cerebral infarction in the left ventral cookie.    Narrative    EXAM: MR BRAIN FOR STROKE COMPLETE WITHOUT AND WITH CONTRAST  LOCATION: [facility]  DATE/TIME: 10/27/2021, 2:36 AM    INDICATION: Unstable gait.  COMPARISON: 09/29/2021.  CONTRAST: 8.5 mL Gadavist.  TECHNIQUE:   1) Routine multiplanar multisequence head MRI without and with intravenous contrast.  2) 3D time-of-flight head MRA without intravenous contrast.  3) Neck MRA without and with IV contrast. Stenosis measurements made according to NASCET criteria unless otherwise specified.    FINDINGS:  HEAD MRI:  INTRACRANIAL CONTENTS: Approximate 1 cm thick linear zone of restricted diffusion along the left ventral margin of the cookie with a 10 mm linear area of extension extending  from the left lateral margin of this more centrally into the cookie. There is   associated FLAIR signal hyperintensity. No associated mass effect. Associated enhancement in this distribution. No mass, acute hemorrhage, or extra-axial fluid collections. Patchy nonspecific T2/FLAIR hyperintensities within the cerebral white matter   most consistent with mild to moderate chronic microvascular ischemic change. Mild to moderate generalized cerebral atrophy. No hydrocephalus. 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 There are several chronic appearing   lacunar infarcts in the bilateral corona radiata and centrum semiovale.    SELLA: No abnormality accounting for technique.    OSSEOUS STRUCTURES/SOFT TISSUES: Normal marrow signal. The major intracranial vascular flow-voids are maintained.     ORBITS: No abnormality accounting for technique.     SINUSES/MASTOIDS: Diffuse scattered mucosal thickening. No middle ear or mastoid effusion.     HEAD MRA:   ANTERIOR CIRCULATION: No flow-limiting stenosis/occlusion, aneurysm, or high flow vascular malformation. Scattered areas of mild irregularity and narrowing. Standard [place] of [place] anatomy.    POSTERIOR CIRCULATION: Short segment moderate narrowing of the mid basilar artery. Intracranial vertebral arteries are uniformly patent. Minimal atheromatous changes in the posterior cerebral arteries.     NECK MRA:   RIGHT CAROTID: No measurable stenosis or dissection.    LEFT CAROTID: No measurable stenosis or dissection.    VERTEBRAL ARTERIES: No focal stenosis or dissection. Dominant left and smaller right vertebral arteries.    AORTIC ARCH: Classic aortic arch anatomy with no significant stenosis at the origin of the great vessels.      Impression    IMPRESSION:  HEAD MRI:   1.  Somewhat morphologically irregular small zone of presumed subacute cerebral infarction involving the left aspect of the cookie. There is associated postcontrast enhancement  which is typically seen in a later subacute stage of infarction, however, the   restricted diffusion and FLAIR signal hyperintensity would suggest an early subacute infarct. No associated mass effect.  2.  Multiple chronic appearing lacunar infarcts in the bilateral coronal radiata and bilateral centrum semiovale.  3.  Underlying mild to moderate volume loss and presumed chronic small vessel ischemic changes.    HEAD MRA:   1.  No major vessel occlusion or flow-limiting stenosis.  2.  Short segmental moderate narrowing of the mid basilar artery.  3.  Scattered areas of mild intracranial atherosclerosis elsewhere.    NECK MRA:  1.  Normal neck MRA.    [Urgent Result: Small zone of subacute cerebral infarction in the left ventral cookie.]    Finding was identified on 10/27/2021 at 3:38 AM.     Dr. Muro was contacted by me on 10/27/2021 at 3:55 AM and verbalized understanding of the critical result.       Echocardiogram Complete - For age > 60 yrs   Result Value    LVEF  60-65%    Narrative    [id number]  [id number]  [id number]  [id number]^NJ^DORI^JEROME     [facility]  Echocardiography Laboratory  [address] [place] [id number]     Name: CRISTOFER CHRISTIANSON  MRN: [id number]  : 1945  Study Date: 10/27/2021 10:19 AM  Age: 76 yrs  Gender: Male  Patient Location: [facility]  Reason For Study: Cerebrovascular Incident  Ordering Physician: DORI MAURO  Performed By: Lora Grove     BSA: 2.0 m2  Height: 68 in  Weight: 189 lb  ______________________________________________________________________________  Procedure  Complete Portable Echo Adult. Contrast Optison. Optison (NDC #4516-4949-69)  given intravenously. Patient was given 6 ml mixture of 3 ml Optison and 6 ml  saline. 3 ml wasted.  ______________________________________________________________________________  Interpretation Summary  No cardiac source for embolus  identified.  ______________________________________________________________________________  Left Ventricle  Global and regional left ventricular function is normal with an EF of 60-65%.  Left ventricular wall thickness is normal. Left ventricular size is normal.  Left ventricular diastolic function is indeterminate. No regional wall motion  abnormalities are seen.     Right Ventricle  Right ventricular function, chamber size, wall motion, and thickness are  normal.     Atria  Both atria appear normal. The atrial septum is intact as assessed by color  Doppler .     Mitral Valve  Mild mitral annular calcification is present.     Aortic Valve  The valve leaflets are not well visualized. On Doppler interrogation, there is  no significant stenosis or regurgitation.     Tricuspid Valve  The valve leaflets are not well visualized. On Doppler interrogation, there is  no significant stenosis or regurgitation. Pulmonary artery systolic pressure  cannot be assessed.     Pulmonic Valve  The valve leaflets are not well visualized. On Doppler interrogation, there is  no significant stenosis or regurgitation.     Vessels  The pulmonary artery cannot be assessed. The inferior vena cava is normal.  Ascending aorta 3.6 cm.     Pericardium  No pericardial effusion is present.     Compared to Previous Study  There is no prior study for direct comparison.  ______________________________________________________________________________  MMode/2D Measurements & Calculations  IVSd: 1.0 cm  LVIDd: 3.7 cm  LVIDs: 2.5 cm  LVPWd: 1.0 cm  FS: 32.5 %  LV mass(C)d: 117.0 grams  LV mass(C)dI: 58.6 grams/m2  Ao root diam: 3.3 cm  asc Aorta Diam: 3.6 cm  LVOT diam: 2.1 cm  LVOT area: 3.5 cm2  LA Volume (BP): 35.1 ml     LA Volume Index (BP): 17.6 ml/m2  RWT: 0.56     Doppler Measurements & Calculations  MV E max viktor: 63.5 cm/sec  MV A max viktor: 81.0 cm/sec  MV E/A: 0.78  MV dec slope: 358.0 cm/sec2  E/E' avg: 10.8  Lateral E/e': 8.8  Medial E/e':  12.7     ______________________________________________________________________________  Report approved by: Miguelito Garcia 10/27/2021 11:01 AM             Recent vital signs:   BP (!) 148/86 (BP Location: Left arm)   Pulse 104   Temp 98.2  F (36.8  C) (Oral)   Resp 10   Wt 85.7 kg (189 lb)   SpO2 96%   BMI 28.32 kg/m      Faustino Coma Scale Score: 15 (10/27/21 1130)  Faustino Coma Scale Score: 15 (10/27/21 0600)       Cardiac Rhythm:   Pt needs tele?   Skin/wound Issues: None    Code Status: Full Code    Pain control: good    Nausea control: good    Abnormal labs/tests/findings requiring intervention: MRI    Family present during ED course? Yes   Family Comments/Social Situation comments:     Tasks needing completion: Neuro checks    Dilia Sanchez, RN  [phone] [facility]

## 2021-10-27 NOTE — ED PROVIDER NOTES
SIGN-OUT:  - Assumed care of this patient from Dr. Gutierrez  - Pending at shift change: MR/MRA Brain, Neuro Consult  - Tentative plan from original EM Physician: Follow-up MRI results, discussed with Neurology, disposition as per Neurology, though per original ED team, should be admitted to their service or other ED/IM observation service for further evaluation/management given ambulation difficulties, recommendations for safety eval, PT OT, at a minimum in addition to further work-up of symptoms.      SIGNOUT:  - Patient signed out to overnight EM Physician.  - Impression at shift change: Balance issues, gait problems  - Pending at shift change: MR, Neuro Consult  - Tentative plan: F/U MR, Discuss w/ Neuro             Ninoska Jenkins MD  10/27/21 [id number]

## 2021-10-27 NOTE — ED PROVIDER NOTES
Emergency Department Patient Sign-out       Brief HPI:  This is a 76 year old male signed out to me by Dr. Jenkins .  See initial ED Provider note for details of the presentation.         Exam:   Patient Vitals for the past 24 hrs:   BP Temp Temp src Pulse Resp SpO2   10/26/21 1800 (!) 132/98 -- -- 97 -- --   10/26/21 1358 (!) 141/91 97.9  F (36.6  C) Oral 94 18 96 %           ED RESULTS:   Results for orders placed or performed during the hospital encounter of 10/26/21 (from the past 24 hour(s))   [place] Draw     Status: None    Collection Time: 10/26/21  2:56 PM    Narrative    The following orders were created for panel order [place] Draw.  Procedure                               Abnormality         Status                     ---------                               -----------         ------                     Extra Blue Top Tube[[id number]]                              Final result               Extra Red Top Tube[[id number]]                               Final result               Extra Green Top (Lithium...[[id number]]                      Final result               Extra Purple Top Tube[[id number]]                            Final result                 Please view results for these tests on the individual orders.   Extra Blue Top Tube     Status: None    Collection Time: 10/26/21  2:56 PM   Result Value Ref Range    Hold Specimen JIC    Extra Red Top Tube     Status: None    Collection Time: 10/26/21  2:56 PM   Result Value Ref Range    Hold Specimen JIC    Extra Green Top (Lithium Heparin) Tube     Status: None    Collection Time: 10/26/21  2:56 PM   Result Value Ref Range    Hold Specimen JIC    Extra Purple Top Tube     Status: None    Collection Time: 10/26/21  2:56 PM   Result Value Ref Range    Hold Specimen JIC    CBC with platelets differential     Status: None    Collection Time: 10/26/21  2:56 PM    Narrative    The following orders were created for panel order CBC with platelets  differential.  Procedure                               Abnormality         Status                     ---------                               -----------         ------                     CBC with platelets and d...[[id number]]                      Final result                 Please view results for these tests on the individual orders.   Basic metabolic panel     Status: Normal    Collection Time: 10/26/21  2:56 PM   Result Value Ref Range    Sodium 137 133 - 144 mmol/L    Potassium 4.1 3.4 - 5.3 mmol/L    Chloride 105 94 - 109 mmol/L    Carbon Dioxide (CO2) 26 20 - 32 mmol/L    Anion Gap 6 3 - 14 mmol/L    Urea Nitrogen 16 7 - 30 mg/dL    Creatinine 0.86 0.66 - 1.25 mg/dL    Calcium 9.0 8.5 - 10.1 mg/dL    Glucose 76 70 - 99 mg/dL    GFR Estimate 83 >60 mL/min/1.73m2   CBC with platelets and differential     Status: None    Collection Time: 10/26/21  2:56 PM   Result Value Ref Range    WBC Count 8.4 4.0 - 11.0 10e3/uL    RBC Count 5.08 4.40 - 5.90 10e6/uL    Hemoglobin 15.7 13.3 - 17.7 g/dL    Hematocrit 46.5 40.0 - 53.0 %    MCV 92 78 - 100 fL    MCH 30.9 26.5 - 33.0 pg    MCHC 33.8 31.5 - 36.5 g/dL    RDW 12.2 10.0 - 15.0 %    Platelet Count 169 150 - 450 10e3/uL    % Neutrophils 56 %    % Lymphocytes 25 %    % Monocytes 11 %    % Eosinophils 7 %    % Basophils 1 %    % Immature Granulocytes 0 %    NRBCs per 100 WBC 0 <1 /100    Absolute Neutrophils 4.7 1.6 - 8.3 10e3/uL    Absolute Lymphocytes 2.1 0.8 - 5.3 10e3/uL    Absolute Monocytes 0.9 0.0 - 1.3 10e3/uL    Absolute Eosinophils 0.6 0.0 - 0.7 10e3/uL    Absolute Basophils 0.1 0.0 - 0.2 10e3/uL    Absolute Immature Granulocytes 0.0 <=0.0 10e3/uL    Absolute NRBCs 0.0 10e3/uL   US SAVANAH Doppler No Exercise     Status: None    Collection Time: 10/26/21  4:37 PM    Narrative    SAVANAH 10/26/2021 4:37 PM    CLINICAL HISTORY: Claudication symptoms.     COMPARISONS: None available.    REFERRING PROVIDER: DEDRICK NAVARRETE JAMES    TECHNIQUE: Bilateral SAVANAH obtained.  No TBI, VPR, or PPG obtained.    FINDINGS:  RIGHT:       Brachial: 146 mmHg       Ankle (PT): 196 mmHg       Ankle (DP): 197 mmHg         SAVANAH: 1.27    LEFT:       Brachial: 155 mmHg       Ankle (PT): 188 mmHg       Ankle (DP): 156 mmHg         SAVANAH: 1.21      Impression    IMPRESSION:  1. RIGHT:       A. Resting SAVANAH is normal, 1.27.    2. LEFT:       A. Resting SAVANAH is normal, 1.21.    Guidelines:    SAVANAH Diagnostic Criteria (Based on criteria published in Circulation  2011; 124: 3301-0383):    > 1.4: Non compressible    1.00 - 1.40: Normal    0.91 - 0.99: Borderline    At or below 0.90: Abnormal    SAVANAH Diagnostic Criteria (Based on ACC/AHA guideline 2008):    >/=1.3 - non compressible vessels    1.00  -1.29 - Normal    0.91 - 0.99 - Borderline    0.41 - 0.90 - Mild to moderate PAD    0.00 - 0.40 - Severe PAD    NYASIA ZAMORA MD         SYSTEM ID:  [id number]   UA with Microscopic reflex to Culture     Status: Abnormal    Collection Time: 10/26/21  5:00 PM    Specimen: Urine, Midstream   Result Value Ref Range    Color Urine Light Yellow Colorless, Straw, Light Yellow, Yellow    Appearance Urine Clear Clear    Glucose Urine 500  (A) Negative mg/dL    Bilirubin Urine Negative Negative    Ketones Urine Trace (A) Negative mg/dL    Specific Gravity Urine 1.025 1.003 - 1.035    Blood Urine Negative Negative    pH Urine 5.0 5.0 - 7.0    Protein Albumin Urine 10  (A) Negative mg/dL    Urobilinogen Urine Normal Normal, 2.0 mg/dL    Nitrite Urine Negative Negative    Leukocyte Esterase Urine Negative Negative    Bacteria Urine Few (A) None Seen /HPF    Mucus Urine Present (A) None Seen /LPF    RBC Urine <1 <=2 /HPF    WBC Urine 1 <=5 /HPF    Narrative    Urine Culture not indicated   MR Cervical Spine w/o Contrast     Status: None    Collection Time: 10/26/21  7:21 PM    Narrative    MR CERVICAL SPINE W/O CONTRAST 10/26/2021 7:21 PM    Provided History: Lower extremity weakness.    Comparison: None    Technique: Sagittal  T1-weighted, sagittal T2-weighted, sagittal STIR,  axial T2-weighted, and axial T2* gradient echo images of the cervical  spine were obtained without intravenous contrast.    Findings:  The cervical vertebrae are normally aligned.  There is mild multilevel  disc height narrowing from C3 through C7. Loss of intrinsic T2 signal  within the intervertebral discs. No myelopathic cord signal.  The  findings on a level by level basis are as follows:    C2-3:  No spinal canal or neural foraminal stenosis.    C3-4:  Disc osteophyte complex with resultant mild to moderate spinal  canal narrowing. Mild to moderate bilateral neural foraminal  narrowing. Uncinate hypertrophy.    C4-5:  Disc osteophyte complex and thickening of the ligamentum  flavum. Resultant moderate spinal canal narrowing. Severe right neural  foraminal narrowing. Moderate left neuroforaminal narrowing. Uncinate  hypertrophy.    C5-6:  Posterior disc bulge. Mild to moderate spinal canal narrowing.  Moderate to severe right and moderate left neuroforaminal narrowing.  Uncinate hypertrophy.    C6-7:  Circumferential disc bulge. Mild spinal canal narrowing.  Bilateral moderate neural foraminal stenosis.    C7-T1:  Small circumferential disc bulge. No significant spinal canal  narrowing. Mild bilateral neuroforaminal narrowing. Uncinate  hypertrophy.     No abnormality of the paraspinous soft tissues.      Impression    Impression:   Multilevel degenerative changes of the cervical spine as described  above in great detail. Most significantly there is moderate spinal  canal stenosis at C4-5 secondary to disc osteophyte complex and  thickening of the ligamentum flavum. In addition, there is bilateral  neural foraminal stenosis, severe on the right and moderate to severe  on the left . No myelopathic cord signal.    I have personally reviewed the examination and initial interpretation  and I agree with the findings.    JUICE CADE MD         SYSTEM ID:  [id number]    MR Lumbar Spine w/o Contrast     Status: None    Collection Time: 10/26/21  7:22 PM    Narrative    Thoracic and Lumbar spine MRI without contrast    History: Progressive lower extremity weakness.    Comparison: None    Technique:  Axial T2-weighted, and sagittal T1, STIR, and T2-weighted  images of the lumbar spine without intravenous contrast. Sagittal T1,  STIR, and T2-weighted images of the thoracic spine without contrast  were obtained, with axial T2-weighted images through levels of  interest in the thoracic spine.    Findings:  Thoracic Spine:  The external marker is at the level of  T5-6  .    There is no definite abnormal signal in the thoracic spinal cord at  any level. Alignment of the thoracic vertebra appears within normal  limits. Bone marrow signal intensity on noncontrast images appears  unremarkable. Multilevel posterior disc bulge at T3-T5, T7-T9, and  T10-L1. No significant associated spinal canal or neuroforaminal  stenosis at any level. Mild multilevel degenerative changes of the  thoracic spine including facet hypertrophy and disc osteophyte  complexes as described above.    Lumbar Spine:  There are 5 type lumbar vertebra.  The tip of the conus is at  approximately L1-2. The alignment of the lumbar spine is unremarkable.    As far as the bone marrow signal intensity, no abnormality is noted.  Near-complete intervertebral disc height loss at L4-L5. Mild disc  height loss at L2-L4 and L5-S1.  On a level by level basis, the findings are as follows:    L1-2: Small posterior disc bulge. Mild intervertebral disc height  loss. No significant spinal canal or neuroforaminal narrowing. Facet  arthropathy.    L2-3:  Posterior disc osteophyte complex. Intervertebral disc height  loss. Mild spinal canal narrowing. Mild bilateral neuroforaminal  narrowing. Facet arthropathy.    L3-4:  Circumferential disc osteophyte complex. Ligamentum flavum  thickening. Moderate to severe spinal canal narrowing. Mild  to  moderate bilateral neuroforaminal narrowing. Facet arthropathy.    L4-5:  Circumferential disc osteophyte complex. Mild spinal canal  narrowing. Ligamentum flavum hypertrophy. Mild to moderate bilateral  neuroforaminal narrowing. Facet arthropathy.    L5-S1:  Circumferential disc ossify complex. Bilateral mild to  moderate neural foraminal narrowing. No significant spinal canal  narrowing. Facet arthropathy. .    The visualized paraspinous tissues anteriorly are unremarkable.      Impression    Impression:   1. No definite abnormality within the thoracic spinal cord or  vertebra.   2. Multilevel degenerative changes of the lumbar spine most  significant at L3-L4 with moderate to severe spinal canal narrowing  and mild to moderate bilateral neural foraminal narrowing.    I have personally reviewed the examination and initial interpretation  and I agree with the findings.    JUICE CADE MD         SYSTEM ID:  [id number]   MR Thoracic Spine w/o Contrast     Status: None    Collection Time: 10/26/21  7:22 PM    Narrative    Thoracic and Lumbar spine MRI without contrast    History: Progressive lower extremity weakness.    Comparison: None    Technique:  Axial T2-weighted, and sagittal T1, STIR, and T2-weighted  images of the lumbar spine without intravenous contrast. Sagittal T1,  STIR, and T2-weighted images of the thoracic spine without contrast  were obtained, with axial T2-weighted images through levels of  interest in the thoracic spine.    Findings:  Thoracic Spine:  The external marker is at the level of  T5-6  .    There is no definite abnormal signal in the thoracic spinal cord at  any level. Alignment of the thoracic vertebra appears within normal  limits. Bone marrow signal intensity on noncontrast images appears  unremarkable. Multilevel posterior disc bulge at T3-T5, T7-T9, and  T10-L1. No significant associated spinal canal or neuroforaminal  stenosis at any level. Mild multilevel degenerative changes  of the  thoracic spine including facet hypertrophy and disc osteophyte  complexes as described above.    Lumbar Spine:  There are 5 type lumbar vertebra.  The tip of the conus is at  approximately L1-2. The alignment of the lumbar spine is unremarkable.    As far as the bone marrow signal intensity, no abnormality is noted.  Near-complete intervertebral disc height loss at L4-L5. Mild disc  height loss at L2-L4 and L5-S1.  On a level by level basis, the findings are as follows:    L1-2: Small posterior disc bulge. Mild intervertebral disc height  loss. No significant spinal canal or neuroforaminal narrowing. Facet  arthropathy.    L2-3:  Posterior disc osteophyte complex. Intervertebral disc height  loss. Mild spinal canal narrowing. Mild bilateral neuroforaminal  narrowing. Facet arthropathy.    L3-4:  Circumferential disc osteophyte complex. Ligamentum flavum  thickening. Moderate to severe spinal canal narrowing. Mild to  moderate bilateral neuroforaminal narrowing. Facet arthropathy.    L4-5:  Circumferential disc osteophyte complex. Mild spinal canal  narrowing. Ligamentum flavum hypertrophy. Mild to moderate bilateral  neuroforaminal narrowing. Facet arthropathy.    L5-S1:  Circumferential disc ossify complex. Bilateral mild to  moderate neural foraminal narrowing. No significant spinal canal  narrowing. Facet arthropathy. .    The visualized paraspinous tissues anteriorly are unremarkable.      Impression    Impression:   1. No definite abnormality within the thoracic spinal cord or  vertebra.   2. Multilevel degenerative changes of the lumbar spine most  significant at L3-L4 with moderate to severe spinal canal narrowing  and mild to moderate bilateral neural foraminal narrowing.    I have personally reviewed the examination and initial interpretation  and I agree with the findings.    JUICE CADE MD         SYSTEM ID:  [id number]   MR Brain for Stroke Complete     Status: Abnormal (Preliminary result)     Collection Time: 10/27/21  3:29 AM   Result Value Ref Range    Radiologist flags (Urgent)      Small zone of subacute cerebral infarction in the left ventral cookie.    Narrative    EXAM: MR BRAIN FOR STROKE COMPLETE WITHOUT AND WITH CONTRAST  LOCATION: [facility]  DATE/TIME: 10/27/2021, 2:36 AM    INDICATION: Unstable gait.  COMPARISON: 09/29/2021.  CONTRAST: 8.5 mL Gadavist.  TECHNIQUE:   1) Routine multiplanar multisequence head MRI without and with intravenous contrast.  2) 3D time-of-flight head MRA without intravenous contrast.  3) Neck MRA without and with IV contrast. Stenosis measurements made according to NASCET criteria unless otherwise specified.    FINDINGS:  HEAD MRI:  INTRACRANIAL CONTENTS: Approximate 1 cm thick linear zone of restricted diffusion along the left ventral margin of the cookie with a 10 mm linear area of extension extending from the left lateral margin of this more centrally into the cookie. There is   associated FLAIR signal hyperintensity. No associated mass effect. Associated enhancement in this distribution. No mass, acute hemorrhage, or extra-axial fluid collections. Patchy nonspecific T2/FLAIR hyperintensities within the cerebral white matter   most consistent with mild to moderate chronic microvascular ischemic change. Mild to moderate generalized cerebral atrophy. No hydrocephalus. 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 There are several chronic appearing   lacunar infarcts in the bilateral corona radiata and centrum semiovale.    SELLA: No abnormality accounting for technique.    OSSEOUS STRUCTURES/SOFT TISSUES: Normal marrow signal. The major intracranial vascular flow-voids are maintained.     ORBITS: No abnormality accounting for technique.     SINUSES/MASTOIDS: Diffuse scattered mucosal thickening. No middle ear or mastoid effusion.     HEAD MRA:   ANTERIOR CIRCULATION: No flow-limiting  stenosis/occlusion, aneurysm, or high flow vascular malformation. Scattered areas of mild irregularity and narrowing. Standard [place] anatomy.    POSTERIOR CIRCULATION: Short segment moderate narrowing of the mid basilar artery. Intracranial vertebral arteries are uniformly patent. Minimal atheromatous changes in the posterior cerebral arteries.     NECK MRA:   RIGHT CAROTID: No measurable stenosis or dissection.    LEFT CAROTID: No measurable stenosis or dissection.    VERTEBRAL ARTERIES: No focal stenosis or dissection. Dominant left and smaller right vertebral arteries.    AORTIC ARCH: Classic aortic arch anatomy with no significant stenosis at the origin of the great vessels.      Impression    IMPRESSION:  HEAD MRI:   1.  Somewhat morphologically irregular small zone of presumed subacute cerebral infarction involving the left aspect of the cookie. There is associated postcontrast enhancement which is typically seen in a later subacute stage of infarction, however, the   restricted diffusion and FLAIR signal hyperintensity would suggest an early subacute infarct. No associated mass effect.  2.  Multiple chronic appearing lacunar infarcts in the bilateral coronal radiata and bilateral centrum semiovale.  3.  Underlying mild to moderate volume loss and presumed chronic small vessel ischemic changes.    HEAD MRA:   1.  No major vessel occlusion or flow-limiting stenosis.  2.  Short segmental moderate narrowing of the mid basilar artery.  3.  Scattered areas of mild intracranial atherosclerosis elsewhere.    NECK MRA:  1.  Normal neck MRA.    [Urgent Result: Small zone of subacute cerebral infarction in the left ventral cookie.]    Finding was identified on 10/27/2021 at 3:38 AM.     was contacted by me on 10/27/2021 at 3:55 AM and verbalized understanding of the critical result.           ED MEDICATIONS:   Medications   gadobutrol (GADAVIST) injection 8.5 mL (8.5 mLs Intravenous Given 10/27/21 0316)          Impression:    ICD-10-CM    1. Weakness  R53.1    2. Gait difficulty  R26.9        Plan:    Pending studies include MRI brain and neurology recommendations.     I Reviewed MRI of brain and discussed results with radiology which demonstrates acute infarct/subacute cerebral infarction of the left ventral cookie.  Discussed the case with neurology who at this time recommends dual treatment with 300 of Plavix as well as 81 mg aspirin once patient passes bedside swallow study.  We'll plan on admission to the neurology service.  Dr. Euceda.    Brain MRI - small acute infarct in left central cookie    MD Bhaskar Stevens, Erica Rodas MD  10/27/21 0061

## 2021-10-27 NOTE — CONSULTS
[facility]   PM&R CONSULT    Consulting Provider: Westley Seals MD (neurology)  Reason for Consult: Assessment of rehabilitation   Location of Patient: Emergency department room #9  Date of Encounter: 10/27/2021   Date of Admission: 10/26/2021    ASSESSMENT/PLAN:    Mr. Jose Mallory is 76Y/M with PMH of diabetes mellitus, hyperlipidemia, cerebellar arachnoid cyst, chronic lacune, peripheral neuropathy, bilateral knee replacement who was seen in neurosurgery clinic on 10/26/2021 for evaluation of gait difficulties from last 2 years which progressively got worse last 2 months.  Thoracic and lumbar spine MRI is negative for any acute pathology.  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  He was outside the TPA window and was managed medically.  Functionally patient is able to walk with a walker with supervision with physical therapy.  Patient is requiring assistance with maintaining balance while sitting in bed.  He will benefit from short acute rehab stay.    Plan:  -Continue physical therapy/Occupational Therapy  -When medically stable/work-up for stroke complete, patient will be appropriate for acute rehab unit.    Patient was seen and discussed with my staff physician Dr. Sanz, who agrees with my assessment and plan.    Marino Olivarez   PGY 3  Physical Medicine and Rehabilitation  Pager: [phone]    HPI:    Mr. Jose Mallory is 76Y/M with PMH of diabetes mellitus, hyperlipidemia, cerebellar arachnoid cyst, chronic lacune, peripheral neuropathy, bilateral knee replacement who was seen in neurosurgery clinic on 10/26/2021 for evaluation of gait difficulties from last 2 years which progressively got worse last 2 months.  Patient was sent to the ED for further imaging.  In the ED his thoracolumbar as well as cervical spine MRIs did not show any acute pathology.  Neurology team was consulted and recommended MRI stroke sequence for the patient, which  showed,DWI with diffusion restriction in left cookie with corresponding ADC change, flair hyperintensity noted in same area as well as minimal contrast enhancement, consistent with subacute infarct.  Patient was out of outside the TPA window and TPA was not administered.  Patient was loaded with Plavix 300 mg one-time dose and continued on Plavix 75 mg daily, atorvastatin 80 mg daily and was admitted to the neurology service.      PREVIOUS LEVEL OF FUNCTION   Gait difficulties from last 2 years, was using cane for walking, kneeling for wheeled walker recently.  Also requiring chair left and avoiding basement going independently.      CURRENT FUNCTION   PT: Was seen with the physical therapy walking with a 2 wheeled walker with contact-guard assist/supervision    OT: Decreased I in ADLs due to deconditioning,WFL B UEs (although slightly weaker on L).No overt cognitive issues noted on eval    SLP: set up only required,No overt s/sx of aspiration, oral phase WFL    LIVING SITUATION/SUPPORT  Patient lives with wife in a house with [place] miles from [facility]  Patient lives in a house/apartment with 3 RAJAT kitchen, bathroom, walk-in shower or on the main level.  Support system includes: wife    Work status; retired  Past Medical History:  No past medical history on file.    Current Medications:  Current Facility-Administered Medications   Medication     acetaminophen (TYLENOL) tablet 650 mg     aspirin (ASA) chewable tablet 81 mg     atorvastatin (LIPITOR) tablet 80 mg     [START ON 10/28/2021] clopidogrel (PLAVIX) tablet 75 mg     enalapril (VASOTEC) tablet 5 mg    And     hydrochlorothiazide (HYDRODIURIL) tablet 12.5 mg     enoxaparin ANTICOAGULANT (LOVENOX) injection 40 mg     lidocaine (LMX4) cream     lidocaine 1 % 0.1-1 mL     medication instruction - No oral meds if patient didn't pass dysphagia screen     Medication Instructions - Avoid dextrose in IV solutions.     metFORMIN (GLUCOPHAGE) tablet 1,000 mg      ondansetron (ZOFRAN-ODT) ODT tab 4 mg    Or     ondansetron (ZOFRAN) injection 4 mg     prochlorperazine (COMPAZINE) injection 5 mg    Or     prochlorperazine (COMPAZINE) tablet 5 mg    Or     prochlorperazine (COMPAZINE) suppository 12.5 mg     sodium chloride (PF) 0.9% PF flush 3 mL     sodium chloride (PF) 0.9% PF flush 3 mL     sodium chloride (PF) 0.9% PF flush 6 mL     Current Outpatient Medications   Medication     aspirin (ASA) 81 MG chewable tablet     enalapril (VASOTEC) 10 MG tablet     glipiZIDE (GLUCOTROL XL) 10 MG 24 hr tablet     metFORMIN (GLUCOPHAGE) 1000 MG tablet     atorvastatin (LIPITOR) 10 MG tablet     ENALAPRIL-HYDROCHLOROTHIAZIDE PO         On examination   BP (!) 148/86 (BP Location: Left arm)   Pulse 75   Temp 98  F (36.7  C) (Oral)   Resp 16   Wt 85.7 kg (189 lb)   SpO2 94%   BMI 28.32 kg/m    Physical Exam   Constitutional:   Well nourished, well developed, walking    with 2 wheeled walker with supervision with the physical therapy team.  Head: Normocephalic and atraumatic.   Eyes: Conjunctivae are normal.  Moist mucous membranes  Neck:   No adenopathy, no bony tenderness  Cardiovascular: Regular rate and rhythm, palpable pulses  Pulmonary/Chest: Breathing comfortably on room air  GI: Soft, nontender  Musculoskeletal: No bilateral edema lower extremities, bilateral calves nontender  Skin: Visible skin without any skin changes  Neurological: Alert and oriented to person, place, and time.   Psychiatric:  Normal mood and affect.  MSK/neuro:   Mental Status:  alert and oriented x3   Cranial Nerves: grossly normal    Sensory: Normal to light touch in bilateral upper and lower extremities    Strength:        Right  Left   Deltoid Abduction 5/5  5/5   Bicep flexion  5/5  5/5   Bicep extension          5/5  5/5   Wrist extension           5/5  5/5   Finger   5/5  5/5     Hip flexion   5/5  5/5   Knee extension 5/5  5/5   Ankle DF  5/5  5/5   Ankle PF  5/5  5/5   Hallux  DF  4/5  4/5       Reflexes:                  Patella  Achilles  R           0           0            L           0           0            Patterson's test: negative bilaterally    Tone per modified Jj Scale: None   Abnormal movements: None   Coordination: Bilateral dysmetria with finger-nose-finger test   Speech: fluent   Cognition: intact   Gait: Truncal ataxia, difficulty sitting in the end of the bed   Skin: Visible skin without any skin changes      Labs   Lab Results   Component Value Date    WBC 8.4 10/26/2021    HGB 15.7 10/26/2021    HCT 46.5 10/26/2021    MCV 92 10/26/2021     10/26/2021     Lab Results   Component Value Date     10/26/2021    POTASSIUM 4.2 10/27/2021    CHLORIDE 105 10/26/2021    CO2 26 10/26/2021     (H) 10/27/2021     Lab Results   Component Value Date    GFRESTIMATED 83 10/26/2021     Lab Results   Component Value Date    AST 14 10/26/2021    ALT 22 10/26/2021    ALKPHOS 70 10/26/2021    BILITOTAL 0.4 10/26/2021     No results found for: INR  Lab Results   Component Value Date    BUN 16 10/26/2021    CR 0.86 10/26/2021     Imaging:  HEAD MRI 10/27/2021    INTRACRANIAL CONTENTS: Approximate 1 cm thick linear zone of restricted diffusion along the left ventral margin of the cookie with a 10 mm linear area of extension extending from the left lateral margin of this more centrally into the cookie. There is   associated FLAIR signal hyperintensity. No associated mass effect. Associated enhancement in this distribution. No mass, acute hemorrhage, or extra-axial fluid collections. Patchy nonspecific T2/FLAIR hyperintensities within the cerebral white matter   most consistent with mild to moderate chronic microvascular ischemic change. Mild to moderate generalized cerebral atrophy. No hydrocephalus. 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 There are several chronic appearing   lacunar infarcts in the bilateral corona radiata and centrum  semiovale.     SELLA: No abnormality accounting for technique.     OSSEOUS STRUCTURES/SOFT TISSUES: Normal marrow signal. The major intracranial vascular flow-voids are maintained.      ORBITS: No abnormality accounting for technique.      SINUSES/MASTOIDS: Diffuse scattered mucosal thickening. No middle ear or mastoid effusion.      HEAD MRA:   ANTERIOR CIRCULATION: No flow-limiting stenosis/occlusion, aneurysm, or high flow vascular malformation. Scattered areas of mild irregularity and narrowing. Standard [place] of [place] anatomy.     POSTERIOR CIRCULATION: Short segment moderate narrowing of the mid basilar artery. Intracranial vertebral arteries are uniformly patent. Minimal atheromatous changes in the posterior cerebral arteries.      NECK MRA:   RIGHT CAROTID: No measurable stenosis or dissection.     LEFT CAROTID: No measurable stenosis or dissection.     VERTEBRAL ARTERIES: No focal stenosis or dissection. Dominant left and smaller right vertebral arteries.     AORTIC ARCH: Classic aortic arch anatomy with no significant stenosis at the origin of the great vessels.                                                                      IMPRESSION:  HEAD MRI:   1.  Somewhat morphologically irregular small zone of presumed subacute cerebral infarction involving the left aspect of the cookie. There is associated postcontrast enhancement which is typically seen in a later subacute stage of infarction, however, the   restricted diffusion and FLAIR signal hyperintensity would suggest an early subacute infarct. No associated mass effect.  2.  Multiple chronic appearing lacunar infarcts in the bilateral coronal radiata and bilateral centrum semiovale.  3.  Underlying mild to moderate volume loss and presumed chronic small vessel ischemic changes.     HEAD MRA:   1.  No major vessel occlusion or flow-limiting stenosis.  2.  Short segmental moderate narrowing of the mid basilar artery.  3.  Scattered areas of mild  intracranial atherosclerosis elsewhere.     NECK MRA:  1.  Normal neck MRA.    MRI Lumbar spine 10/26/2021   Impression:   1. No definite abnormality within the thoracic spinal cord or  vertebra.   2. Multilevel degenerative changes of the lumbar spine most  significant at L3-L4 with moderate to severe spinal canal narrowing  and mild to moderate bilateral neural foraminal narrowing.    MRI Thoracic Spine 10/26/2021  Impression:   1. No definite abnormality within the thoracic spinal cord or  vertebra.   2. Multilevel degenerative changes of the lumbar spine most  significant at L3-L4 with moderate to severe spinal canal narrowing  and mild to moderate bilateral neural foraminal narrowing.

## 2021-10-28 ENCOUNTER — APPOINTMENT (OUTPATIENT)
Dept: PHYSICAL THERAPY | Facility: CLINIC | Age: 76
DRG: 065 | End: 2021-10-28
Payer: MEDICARE

## 2021-10-28 ENCOUNTER — APPOINTMENT (OUTPATIENT)
Dept: SPEECH THERAPY | Facility: CLINIC | Age: 76
DRG: 065 | End: 2021-10-28
Payer: MEDICARE

## 2021-10-28 LAB
ANA SER QL IF: NEGATIVE
ERYTHROCYTE [DISTWIDTH] IN BLOOD BY AUTOMATED COUNT: 12 % (ref 10–15)
GLUCOSE BLDC GLUCOMTR-MCNC: 117 MG/DL (ref 70–99)
GLUCOSE BLDC GLUCOMTR-MCNC: 141 MG/DL (ref 70–99)
GLUCOSE BLDC GLUCOMTR-MCNC: 147 MG/DL (ref 70–99)
GLUCOSE BLDC GLUCOMTR-MCNC: 148 MG/DL (ref 70–99)
GLUCOSE BLDC GLUCOMTR-MCNC: 204 MG/DL (ref 70–99)
HCT VFR BLD AUTO: 43.1 % (ref 40–53)
HGB BLD-MCNC: 14.6 G/DL (ref 13.3–17.7)
HIV 1+2 AB+HIV1 P24 AG SERPL QL IA: NONREACTIVE
MCH RBC QN AUTO: 30.5 PG (ref 26.5–33)
MCHC RBC AUTO-ENTMCNC: 33.9 G/DL (ref 31.5–36.5)
MCV RBC AUTO: 90 FL (ref 78–100)
PLATELET # BLD AUTO: 153 10E3/UL (ref 150–450)
RBC # BLD AUTO: 4.78 10E6/UL (ref 4.4–5.9)
TOTAL PROTEIN SERUM FOR ELP: 7.6 G/DL (ref 6.8–8.8)
WBC # BLD AUTO: 6.8 10E3/UL (ref 4–11)

## 2021-10-28 PROCEDURE — 250N000013 HC RX MED GY IP 250 OP 250 PS 637: Performed by: STUDENT IN AN ORGANIZED HEALTH CARE EDUCATION/TRAINING PROGRAM

## 2021-10-28 PROCEDURE — 250N000013 HC RX MED GY IP 250 OP 250 PS 637

## 2021-10-28 PROCEDURE — 92526 ORAL FUNCTION THERAPY: CPT | Mod: GN

## 2021-10-28 PROCEDURE — 99233 SBSQ HOSP IP/OBS HIGH 50: CPT | Mod: GC | Performed by: PSYCHIATRY & NEUROLOGY

## 2021-10-28 PROCEDURE — 36415 COLL VENOUS BLD VENIPUNCTURE: CPT

## 2021-10-28 PROCEDURE — 83090 ASSAY OF HOMOCYSTEINE: CPT

## 2021-10-28 PROCEDURE — 97530 THERAPEUTIC ACTIVITIES: CPT | Mod: GP

## 2021-10-28 PROCEDURE — 97116 GAIT TRAINING THERAPY: CPT | Mod: GP

## 2021-10-28 PROCEDURE — 250N000012 HC RX MED GY IP 250 OP 636 PS 637: Performed by: STUDENT IN AN ORGANIZED HEALTH CARE EDUCATION/TRAINING PROGRAM

## 2021-10-28 PROCEDURE — 250N000013 HC RX MED GY IP 250 OP 250 PS 637: Performed by: PSYCHIATRY & NEUROLOGY

## 2021-10-28 PROCEDURE — 36415 COLL VENOUS BLD VENIPUNCTURE: CPT | Performed by: STUDENT IN AN ORGANIZED HEALTH CARE EDUCATION/TRAINING PROGRAM

## 2021-10-28 PROCEDURE — 250N000011 HC RX IP 250 OP 636: Performed by: STUDENT IN AN ORGANIZED HEALTH CARE EDUCATION/TRAINING PROGRAM

## 2021-10-28 PROCEDURE — 86592 SYPHILIS TEST NON-TREP QUAL: CPT

## 2021-10-28 PROCEDURE — 87389 HIV-1 AG W/HIV-1&-2 AB AG IA: CPT

## 2021-10-28 PROCEDURE — 120N000002 HC R&B MED SURG/OB UMMC

## 2021-10-28 PROCEDURE — 85027 COMPLETE CBC AUTOMATED: CPT | Performed by: STUDENT IN AN ORGANIZED HEALTH CARE EDUCATION/TRAINING PROGRAM

## 2021-10-28 RX ORDER — ATORVASTATIN CALCIUM 40 MG/1
40 TABLET, FILM COATED ORAL EVERY EVENING
Status: DISCONTINUED | OUTPATIENT
Start: 2021-10-28 | End: 2021-10-29 | Stop reason: HOSPADM

## 2021-10-28 RX ORDER — BISACODYL 10 MG
10 SUPPOSITORY, RECTAL RECTAL DAILY PRN
Status: DISCONTINUED | OUTPATIENT
Start: 2021-10-28 | End: 2021-10-29 | Stop reason: HOSPADM

## 2021-10-28 RX ORDER — AMOXICILLIN 250 MG
1 CAPSULE ORAL 2 TIMES DAILY PRN
Status: DISCONTINUED | OUTPATIENT
Start: 2021-10-28 | End: 2021-10-29 | Stop reason: HOSPADM

## 2021-10-28 RX ORDER — POLYETHYLENE GLYCOL 3350 17 G/17G
17 POWDER, FOR SOLUTION ORAL 2 TIMES DAILY PRN
Status: DISCONTINUED | OUTPATIENT
Start: 2021-10-28 | End: 2021-10-29 | Stop reason: HOSPADM

## 2021-10-28 RX ADMIN — INSULIN ASPART 1 UNITS: 100 INJECTION, SOLUTION INTRAVENOUS; SUBCUTANEOUS at 08:48

## 2021-10-28 RX ADMIN — INSULIN ASPART 1 UNITS: 100 INJECTION, SOLUTION INTRAVENOUS; SUBCUTANEOUS at 17:56

## 2021-10-28 RX ADMIN — HYDROCHLOROTHIAZIDE 12.5 MG: 12.5 TABLET ORAL at 08:33

## 2021-10-28 RX ADMIN — ENOXAPARIN SODIUM 40 MG: 40 INJECTION SUBCUTANEOUS at 08:33

## 2021-10-28 RX ADMIN — ATORVASTATIN CALCIUM 40 MG: 40 TABLET, FILM COATED ORAL at 19:55

## 2021-10-28 RX ADMIN — METFORMIN HYDROCHLORIDE 1000 MG: 500 TABLET ORAL at 08:33

## 2021-10-28 RX ADMIN — ASPIRIN 81 MG CHEWABLE TABLET 324 MG: 81 TABLET CHEWABLE at 08:32

## 2021-10-28 RX ADMIN — ENALAPRIL MALEATE 5 MG: 5 TABLET ORAL at 08:33

## 2021-10-28 RX ADMIN — CLOPIDOGREL BISULFATE 75 MG: 75 TABLET ORAL at 08:33

## 2021-10-28 RX ADMIN — DOCUSATE SODIUM 50 MG AND SENNOSIDES 8.6 MG 1 TABLET: 8.6; 5 TABLET, FILM COATED ORAL at 13:36

## 2021-10-28 RX ADMIN — METFORMIN HYDROCHLORIDE 1000 MG: 500 TABLET ORAL at 17:56

## 2021-10-28 RX ADMIN — INSULIN ASPART 1 UNITS: 100 INJECTION, SOLUTION INTRAVENOUS; SUBCUTANEOUS at 13:25

## 2021-10-28 ASSESSMENT — ACTIVITIES OF DAILY LIVING (ADL)
ADLS_ACUITY_SCORE: 9

## 2021-10-28 ASSESSMENT — VISUAL ACUITY
OU: NORMAL ACUITY

## 2021-10-28 NOTE — PROGRESS NOTES
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  He and his report that the gait instability first started being noticeable almost two years ago, but that he was \"just slowing down\" and could ambulate independently.  Two months ago, Jose reports that his gait disturbance became worse, requiring him to use a cane for stability, and that he suffered a fall as a result of this.  He received an MRI but never was told results.  This MRI showed a right pontine infarct.  His gait instability again worsened hyperacutely 7-10 days ago to the point where he now needed a walker and assistance for ambulation.  He was sent to [facility] after receiving spinal imaging via his neurosurgeon for an expedited work up.  Here at [facility], a repeat MRI brain was ordered which showed a subacute left pontine infarct with mid-basilar artery stenosis.  Stroke work up was initiated for investigation.     Stroke Evaluation Summarized    MRI/Head CT Brain MRI 10/26  HEAD MRI:   1.  Somewhat morphologically irregular small zone of presumed subacute cerebral infarction involving the left aspect of the cookie. There is associated postcontrast enhancement which is typically seen in a later subacute stage of infarction, however, the   restricted diffusion and FLAIR signal hyperintensity would suggest an early subacute infarct. No associated mass effect.  2.  Multiple chronic appearing lacunar infarcts in the bilateral coronal radiata and bilateral centrum semiovale.  3.  Underlying mild to moderate volume loss and presumed chronic small vessel ischemic changes.      Intracranial Vasculature HEAD MRA:   1.  No major vessel occlusion " or flow-limiting stenosis.  2.  Short segmental moderate narrowing of the mid basilar artery.  3.  Scattered areas of mild intracranial atherosclerosis elsewhere.   Cervical Vasculature NECK MRA:  1.  Normal neck MRA.     Echocardiogram TTE 10/26  -Global and regional left ventricular function with EF of 60-65%  -Left ventricular wall thickeness normal, LV size normal  -No regional wall abnormalities seen  -No significant valvular stenosis or regurgitation  -No cardiac source for embolus identified.   EKG/Telemetry Telemetry has shown NSR to date   Other Testing As Below     LDL  10/26/2021: 70 mg/dL   A1C  10/26/2021: 6.3 %   Troponin 10/26/2021: <0.015 ug/L       Impression & Plan  #Subacute L pontine stroke  #Subacute to chronic R pontine stroke  #Decreased vibration sensation bilaterally in lower extremities, peripheral neuropathy  #Gait instability  #Arachnoid cyst  Pt has history of gait instability for roughly two years.  This worsened acutely 2 months ago and the pt required a cane for ambulation.  This worsened further roughly 7-10 days ago to where the pt required a walker for ambulation.  Two episodes of acute worsening likely attributed to bilateral pontine strokes, the first evidenced on MRI in September and the second evidenced on MRI on the current admission.  However, these strokes are not congruent with a 2 year history of gait instability.  Pt does have a Hx of DMII, likely moderate peripheral neuropathy can explain some gait instability, however, other etiologies can explain this as well.  Some work up (as below) has been initiated for this, and some work up will be completed in the outpatient setting per general neurology service.    - Imaging results as above  - DAPT therapy for secondary stroke prevention: ASA 325mg daily indefinitely, and Plavix 75mg daily for 3 months  - Statin therapy: atorvastatin 40mg daily  - TTE as above  - Telemetry shows NSR to date  - A1C = 6.3  - LDL = 70  -  PT/OT/SLP evaluations complete;  ARU placement and regular diet with thin liquids.   - Vitamin B12 wnl  - NEHA negative  - Other neuropathy labs that are pending: B6, Zinc, Copper, MMA, Homocysteine, RPR, HIV, Vitamin E  - OK for outpatient EMG to evaluate extent of peripheral neuropathy    #HTN  - PTA enalapril 5mg PO daily     #DMII  - PTA metformin    #Disposition  PT/OT recommend ARU placment for the pt.  Pt expresses interest in going to an ARU closer to home in [place].  Social work aware and following.       The patient was discussed with the Stroke attending Dr. Euceda and the general neurology attending Dr. Yenni Leroy DO  Neurology Resident, PGY-1  Ph: *[phone]  ______________________________________________________    Clinically Significant Risk Factors Present on Admission                 Medications   Scheduled Meds    aspirin  324 mg Oral Daily     atorvastatin  80 mg Oral QPM     clopidogrel  75 mg Oral or NG Tube Daily     enalapril  5 mg Oral Daily     enoxaparin ANTICOAGULANT  40 mg Subcutaneous Q24H     insulin aspart  1-7 Units Subcutaneous TID AC     insulin aspart  1-5 Units Subcutaneous At Bedtime     metFORMIN  1,000 mg Oral BID w/meals     sodium chloride (PF)  3 mL Intracatheter Q8H     sodium chloride (PF)  6 mL Intravenous Once       Infusion Meds    - MEDICATION INSTRUCTIONS -       - MEDICATION INSTRUCTIONS -         PRN Meds  acetaminophen, bisacodyl, glucose **OR** dextrose **OR** glucagon, lidocaine 4%, lidocaine (buffered or not buffered), - MEDICATION INSTRUCTIONS -, - MEDICATION INSTRUCTIONS -, ondansetron **OR** ondansetron, polyethylene glycol, prochlorperazine **OR** prochlorperazine **OR** prochlorperazine, senna-docusate, sodium chloride (PF)       PHYSICAL EXAMINATION  Temp:  [97.7  F (36.5  C)-99.9  F (37.7  C)] 97.7  F (36.5  C)  Pulse:  [] 91  Resp:  [10-16] 16  BP: (111-131)/(62-88) 131/88  SpO2:  [95 %-99 %] 95 %      Neurologic  Mental Status:   alert, oriented x 3, follows commands, speech clear and fluent, naming and repetition normal  Cranial Nerves:  visual fields intact, PERRL, EOMI with normal smooth pursuit, facial sensation intact and symmetric, facial movements symmetric, hearing not formally tested but intact to conversation, palate elevation symmetric and uvula midline, no dysarthria, shoulder shrug strong bilaterally, tongue protrusion midline  Motor:  normal muscle tone and bulk, no abnormal movements, able to move all limbs spontaneously, no pronator drift, Slight weakness in the left upper and lower extremities when compared with arm rolling and slight satelliting on the left extremity.  Reflexes:  toes down-going, Decreased reflexes globally. 0-1+ patellar reflexes 2/2 b/l knee replacement.  Sensory:  light touch sensation intact and symmetric throughout upper and lower extremities, Decreased vibratory sensation and proprioception in bilateral lower extremities.   Coordination:  finger to nose and heel to shin intact in left hemibody.  Slight dysmetric movments in LUE and LLE with finger to nose, heel to shin, and rapid alternating movements  Station/Gait:  Truncal ataxia with magnetic gait requiring help of two people to take steps.    Stroke Scales    NIHSS  Interval baseline (10/27/21 6985)   Interval Comments     1a. Level of Consciousness 0-->Alert, keenly responsive   1b. LOC Questions 0-->Answers both questions correctly   1c. LOC Commands 0-->Performs both tasks correctly   2.   Best Gaze 0-->Normal   3.   Visual 0-->No visual loss   4.   Facial Palsy 0-->Normal symmetrical movements   5a. Motor Arm, Left 0-->No drift, limb holds 90 (or 45) degrees for full 10 secs   5b. Motor Arm, Right 0-->No drift, limb holds 90 (or 45) degrees for full 10 secs   6a. Motor Leg, Left 0-->No drift, leg holds 30 degree position for full 5 secs   6b. Motor Leg, right 0-->No drift, leg holds 30 degree position for full 5 secs   7.   Limb Ataxia  2-->Present in two limbs   8.   Sensory 1-->Mild-to-moderate sensory loss, patient feels pinprick is less sharp or is dull on the affected side, or there is a loss of superficial pain with pinprick, but patient is aware of being touched   9.   Best Language 0-->No aphasia, normal   10. Dysarthria 0-->Normal   11. Extinction and Inattention  0-->No abnormality   Total 3 (10/27/21 0502)       Modified [place] Score (Pre-morbid)  1-No significant disability despite symptoms    Imaging  As above.    Lab Results Data   CBC  Recent Labs   Lab 10/28/21  0654 10/27/21  0558 10/26/21  1456   WBC 6.8 7.3 8.4   RBC 4.78 4.60 5.08   HGB 14.6 14.1 15.7   HCT 43.1 42.6 46.5    144* 169     Basic Metabolic Panel    Recent Labs   Lab 10/28/21  0813 10/28/21  0029 10/27/21  2205 10/27/21  1813 10/27/21  1253 10/27/21  1157 10/27/21  0554 10/26/21  1456   0000   NA  --   --   --   --  136  --   --  137  --    POTASSIUM  --   --   --   --  4.0  --  4.2 4.1  --    CHLORIDE  --   --   --   --  104  --   --  105  --    CO2  --   --   --   --  19*  --   --  26  --    BUN  --   --   --   --  14  --   --  16  --    CR  --   --   --   --  0.86  0.86  --   --  0.86  --    * 117* 153*   < > 182*   < >  --  76   < >   LEON  --   --   --   --  9.2  --   --  9.0  --     < > = values in this interval not displayed.     Liver Panel  Recent Labs   Lab 10/26/21  1456   PROTTOTAL 7.7   ALBUMIN 3.6   BILITOTAL 0.4   ALKPHOS 70   AST 14   ALT 22     INR  No lab results found.   Lipid Profile    Recent Labs   Lab Test 10/26/21  1456   CHOL 127   HDL 31*   LDL 70   TRIG 128     A1C    Recent Labs   Lab Test 10/26/21  1456   A1C 6.3*     Troponin I    Recent Labs   Lab 10/26/21  1456   TROPONIN <0.015

## 2021-10-28 NOTE — PROGRESS NOTES
"SPIRITUAL HEALTH SERVICES  SPIRITUAL ASSESSMENT Progress Note  [facility] ([place]) 6A  ON-CALL     REFERRAL SOURCE: Admission request    Spoke with pt and pt's wife about their time together (55 years) and their love of their close family (children, grandchildren, and great-grandchildren). Pt noted that \"he would prefer to be in acute rehab near home ([place])\".     Pt values his family and his kaylee.     We discussed themes of community and the mine of family.     Per pt's request I provided prayer.     I provided emotional and spiritual support and oriented pt and his wife to spiritual health services.     PLAN: Will communicate care to unit  Toby. Spiritual health services remains available for any follow-up or requests     Carmella De Jesus    Pager: [phone]    "

## 2021-10-28 NOTE — PROGRESS NOTES
Rehab Admissions:  I spoke w/ Justino and his wife and provided ARC education regarding ARC level of care, anticipated LOS, POC and visitor policy. All questions answered and contact information for ARC provided.    Thank you for the referral, we will continue to follow this patient for post acute placement.     Determination of admission is based upon the patient's need for an intensive, interdisciplinary approach to rehabilitation, their ability to progress, their ability to tolerate intensive therapies, their need for daily physician supervision, their need for twenty four hour nursing assistance, and their ability and willingness to participate in such a program.    Abigail Ruiz CM  Rehab Liaison/  [facility]  10/28/2021    4:36 PM

## 2021-10-28 NOTE — DISCHARGE SUMMARY
"[facility]    Neurology Stroke Discharge Summary    Date of Admission: 10/26/2021  Date of Discharge: 10/29/2021    Disposition: Discharged to rehabilitation facility; [facility]  Primary Care Physician: Pedro Srivastava      Admission Diagnosis:   Gait disturbance.    Discharge Diagnosis:     Subacute left pontine infarction    Problem Leading to Hospitalization (from [facility]):     \"Mr. Jose Mallory is a 76 year old man with a history of DMII, HLD, cerebellar arachnoid cyst, chronic lacunes, peripheral neuropathy, bilateral knee replacement who has had approximately 2 years of slowly progressive gait instability.  He has had several falls during this time.  Describes being \"off balance\".  In the past week or so instability has gotten much worse per patient and family.  His wife and son are present at bedside to contribute to history.  He had previously been seen by a neurologist for ruleout of Parkinson's disease per review of outside records, at that time he was not believed to meet criteria for Parkinson's disease or displays significant parkinsonism.  He was in the [facility] for a neurosurgery clinic appointment on 10/26, when due to concern for progressive severe gait instability he was sent to [facility] emergency department.  Cervical thoracic and lumbar spine were scanned, without significant stenosis or clear etiology for gait instability.  Neurology was subsequently consulted for weakness.      He notes that for the past week he has been unable to walk without a walker or heavy assist from family.  He is unable to  the shower, and has use a shower chair.  Difficulty rising out of chairs or from a seated position.  This is much worse from his previous gait status.  He endorses a long history of decreased sense of smell.  Does not endorse acting out his dreams or any abnormal sleep behaviors.  Does endorse a few months of constipation.  Does not " "endorse any tremor.  Either hearing or smell endorse a history of cognitive impairment including frequent repeating, forgetting unfamiliar faces or places.     On my exam patient has significantly decreased vibratory sensation up to right mid thigh and left hip.  He additionally has increased sensitivity over his bilateral distal lower extremities to pinprick and light touch.  He has dysmetria appreciated in both hands and both legs.  He has decreased sensation on his left hemibody including face compared to his right hemibody.  He has severe truncal instability and cannot sit up on his own.  He is able to stand with assist of 2 and is very unsteady and unable to take even one step.  Unable to stand with his feet together.  This exam was concerning for stroke.  Given timeframe he was not within a window of acute intervention or benefit of lowering risk of bleed.  Recommended ED obtain MRI brain stroke series.     MRI brain demonstrated DWI with diffusion restriction in left cookie with corresponding ADC change, flair hyperintensity noted in same area as well as minimal contrast enhancement.  This would be consistent with a subacute infarct.  Per review of previous work-up there is a report of a previous MRI that describes a small 4 mm area of brainstem diffusion restriction, however it does not appear consistent with the current findings on this imaging.  Unable to compare imaging directly as not available in the system at this time.\"    Please see H&P dated 10/26/2021 for further details about presentation.    Brief Hospital Course:   Mr. Mallory has had a history of gait instability for roughly two years.  This worsened acutely 2 months ago and he has required a cane for ambulation.  This worsened further roughly 7-10 days ago to the point where he required a walker for ambulation.  Two episodes of acute worsening likely attributed to bilateral pontine strokes, the first evidenced on MRI in the right cookie in " September and the second evidenced on MRI in the left cookie during the current admission.  However, these strokes are not congruent with a 2 year history of gait instability.  Pt does have a Hx of DMII, likely moderate peripheral neuropathy can explain some gait instability, however, other etiologies can explain this as well.  Stroke work up was completed.  Etiology of these strokes are likely secondary to atherosclerotic disease and mid basilar artery stenosis.  General neurology was also involved for the long history of gait disturbances.  Work up to date is listed below.     #Subacute L pontine stroke  #Subacute to chronic R pontine stroke  #Decreased vibration sensation bilaterally in lower extremities, peripheral neuropathy  #Gait instability  #Arachnoid cyst  - Imaging results as below  - DAPT therapy for secondary stroke prevention: Aspirin 325mg daily indefinitely, and Plavix 75mg daily for 3 months.  After 3 months, o.k. to stop Plavix but please continue aspirin regimen.  - Statin therapy: atorvastatin 40mg daily  - TTE as below  - Telemetry shows NSR and no evidence of atrial fibrillation  - A1C = 6.3  - LDL = 70  - PT/OT/SLP evaluations complete;  ARU placement and regular diet with thin liquids.   - Vitamin B12 wnl  - NEHA negative  - HIV negative  - Other neuropathy are pending: B6, Zinc, Copper, MMA, Homocysteine, RPR, Vitamin E    IV tPA was not initiated due to unclear or unfavorable risk-benefit profile for extended window thrombolysis beyond the conventional 4.5 hour time window.     Work-up as stated below under Pertinent Investigations.    Etiology is thought to be atherosclerotic.      Rehab evaluation: OT, PT and SLP.     Smoking Cessation: patient is not a smoker    BP Long-term Goal: Eventual goal of normotension of 120/80    Antithrombotic/Anticoagulant Agent: aspirin 325 mg and clopidogrel (Plavix) 75 mg    Statins: Prior to admission statin dose increased to atorvastatin 40mg daily        Hgb A1C Goal: < 7.0    Complications: None.     Other problems addressed during this hospitalization:    #HTN  - PTA enalapril 5mg PO daily     #DMII  - PTA metformin 1g BID    PERTINENT INVESTIGATIONS    Labs  Lipid Panel: Recent Labs   Lab Test 10/26/21  1456   CHOL 127   HDL 31*   LDL 70   TRIG 128     A1C:   Lab Results   Component Value Date    A1C 6.3 10/26/2021     INR: No lab results found in last 7 days.   Coag Panel / Hypercoag Workup: wnl  Pending test results:  B6, Zinc, Copper, MMA, Homocysteine, RPR, Vitamin E, SPEP      IMAGING  MRI/Head CT Brain MRI 10/26  HEAD MRI:   1.  Somewhat morphologically irregular small zone of presumed subacute cerebral infarction involving the left aspect of the cookie. There is associated postcontrast enhancement which is typically seen in a later subacute stage of infarction, however, the   restricted diffusion and FLAIR signal hyperintensity would suggest an early subacute infarct. No associated mass effect.  2.  Multiple chronic appearing lacunar infarcts in the bilateral coronal radiata and bilateral centrum semiovale.  3.  Underlying mild to moderate volume loss and presumed chronic small vessel ischemic changes.      Intracranial Vasculature HEAD MRA:   1.  No major vessel occlusion or flow-limiting stenosis.  2.  Short segmental moderate narrowing of the mid basilar artery.  3.  Scattered areas of mild intracranial atherosclerosis elsewhere.   Cervical Vasculature NECK MRA:  1.  Normal neck MRA.      Echocardiogram TTE 10/26  -Global and regional left ventricular function with EF of 60-65%  -Left ventricular wall thickeness normal, LV size normal  -No regional wall abnormalities seen  -No significant valvular stenosis or regurgitation  -No cardiac source for embolus identified.   EKG/Telemetry Telemetry has shown NSR to date       Endovascular procedure: None     Cardiac Monitoring: Patient had > 24 hrs of cardiac monitor while in hospital.    Findings: No atrial  fibrillation was found.    Sleep Apnea Screen:   Questions/Answers      1. Prior to your stroke, have you been told that you snore? Yes.    2. Prior to your stroke, have you been told that you struggle to breath while you are sleeping? No.    3. Prior to your stroke, do you feel tired and sleepy even after getting a normal night of sleep? No.    Sleep Apnea Screen Findings: Patient has 0-1 symptoms of sleep apnea.  Further sleep study is not recommended at this time.    PHQ-9 Depression Screen Score: 3    Education discussed with: patient and spouse on cholesterol management, medical management and diet modification.    During daily rounds, the plan of care was discussed and developed with patient and significant other.  Plan of care includes: DAPT, acute rehab facility, outpatient neurologic follow up with EMG..    PHYSICAL EXAMINATION  Vital Signs:  B/P: 133/79, T: 97.7, P: 90, R: 16    General:  patient lying in bed without any acute distress     HEENT:  normocephalic/atraumatic  Cardio:  RRR, No murmurs  Pulmonary:  no respiratory distress, lungs CTA bilaterally  Abdomen:  soft, non-tender, non-distended, bowel sounds present  Extremities:  no edema  Skin:  intact, warm/dry     Neurologic  Mental Status:  alert, oriented x 3, follows commands, speech clear and fluent, naming and repetition normal  Cranial Nerves:  visual fields intact, PERRL, EOMI with normal smooth pursuit, facial sensation intact and symmetric, facial movements symmetric, hearing not formally tested but intact to conversation, palate elevation symmetric and uvula midline, no dysarthria, shoulder shrug strong bilaterally, tongue protrusion midline  Motor:  normal muscle tone and bulk, no abnormal movements, able to move all limbs spontaneously, no pronator drift, Slight weakness in the left upper and lower extremities when compared with arm rolling and slight satelliting on the left extremity.  Reflexes:  toes down-going, Decreased reflexes  globally. 0-1+ patellar reflexes 2/2 b/l knee replacement.  Sensory:  light touch sensation intact and symmetric throughout upper and lower extremities, Decreased vibratory sensation and proprioception in bilateral lower extremities.   Coordination:  finger to nose and heel to shin intact in left hemibody.  Slight dysmetric movments in LUE and LLE with finger to nose, heel to shin, and rapid alternating movements  Station/Gait:  Truncal ataxia with magnetic gait requiring help of two people to take steps.    National Institutes of Health Stroke Scale (on day of discharge)  NIHSS Total Score: 3    Modified Carin Score (Discharge)  3-Moderate disability; requiring some help, but able to walk without assistance    Medications    Current Discharge Medication List      START taking these medications    Details   clopidogrel (PLAVIX) 75 MG tablet 1 tablet (75 mg) by Oral or NG Tube route daily  Qty: 90 tablet, Refills: 0    Associated Diagnoses: Cerebrovascular accident (CVA), unspecified mechanism (H)         CONTINUE these medications which have CHANGED    Details   aspirin (ASA) 81 MG chewable tablet Take 4 tablets (324 mg) by mouth daily  Qty: 360 tablet, Refills: 0    Associated Diagnoses: Cerebrovascular accident (CVA), unspecified mechanism (H)      atorvastatin (LIPITOR) 40 MG tablet Take 1 tablet (40 mg) by mouth every evening  Qty: 90 tablet, Refills: 0    Associated Diagnoses: Cerebrovascular accident (CVA), unspecified mechanism (H)      enalapril (VASOTEC) 5 MG tablet Take 1 tablet (5 mg) by mouth daily    Associated Diagnoses: Cerebrovascular accident (CVA), unspecified mechanism (H)         CONTINUE these medications which have NOT CHANGED    Details   glipiZIDE (GLUCOTROL XL) 10 MG 24 hr tablet Take 10 mg by mouth daily      metFORMIN (GLUCOPHAGE) 1000 MG tablet Take 1,000 mg by mouth 2 times daily (with meals)             Additional recommendations and follow up:    - Please follow up with PCP within 1  week after discharge from acute rehab.  - Referral sent for outpatient EMG to evaluate extent of peripheral neuropathy.  - Referral sent for outpatient neurology follow up  - If gait has not improved after the pt's stay at acute rehab and/or by the time there is neurology follow up in the outpatient setting, please consider additional neurosurgery consultation.       Neurology Adult Referral      Neurology Adult Referral      General info for SNF    Length of Stay Estimate: Short Term Care: Estimated # of Days <30  Condition at Discharge: Stable  Level of care:skilled   Rehabilitation Potential: Excellent  Admission H&P remains valid and up-to-date: Yes  Recent Chemotherapy: N/A  Use Nursing Home Standing Orders: Yes     Follow Up and recommended labs and tests    Follow up with primary care provider within 1 week of your discharge from rehab for recent hospitalization.    Please follow up with a neurologist near your home in [place] 4-6 weeks after discharge.    Please obtain an outpatient EMG examination to evaluate your peripheral neuropathy.     Reason for your hospital stay    You were in the hospital for worsening weakness concerning for a stroke which was confirmed on MRI.     Additional Discharge Instructions    PLEASE RETURN TO THE EMERGENCY DEPARTMENT IF:  - You develop worsening difficulty with walking acutely.  - You develop a sudden and very severe headache.  - You develop further deficits on one side of your body compared with the other.  - You have an episode of unconsciousness or any other seizure like activity.     Activity - Up with assistive device     Activity - Up with nursing assistance     Full Code     Physical Therapy Adult Consult    Evaluate and treat as clinically indicated.    Reason:  Stroke     Occupational Therapy Adult Consult    Evaluate and treat as clinically indicated.    Reason:  Stroke     Fall precautions     Diet    Follow this diet upon discharge: Orders Placed This  Encounter      Combination Diet Regular Diet Adult       Patient was seen and discussed with the Attending, Dr. Euceda.    Sharath Leroy DO  Neurology Resident, PGY-1

## 2021-10-28 NOTE — PLAN OF CARE
Arrived from: ED  Belongings/meds: clothing/shoes, watch, cell phone, and wedding ring-remain with patient  2 RN Skin Assessment Completed by: Lora MYERS RN & Daly ALFARO RN  Non-intact findings documented (yes/no/NA): skin intact. Blanchable redness to back.     Status: presented to ED with gait instability- found subacute L pontine stroke on 9/26  Vitals: VSS, RA. CCM.  Neuros: AOx4. Strength 5/5 throughout. Denies N/T.  IV: PIV SL    Resp/trach: WNL  Diet: Regular diet, tolerating well.  Bowel status: BS+, LBM 10/24  : voiding spontaneously via urinal   Skin: intact  Pain: denies  Activity: no oob since arrival, per report A1 GB/Walker  Plan: stroke teaching completed and stroke handbook given to patient. BG checks x2 above 150, MD paged per orders for possible insulin protocol

## 2021-10-28 NOTE — PROGRESS NOTES
Care Management Initial Consult    General Information  Assessment completed with:  (Patient and wife),  (Patient and wife)  Type of CM/SW Visit: Initial Assessment    Primary Care Provider verified and updated as needed: Yes   Readmission within the last 30 days: no previous admission in last 30 days      Reason for Consult:  (initial/stroke assessment)  Advance Care Planning: Advance Care Planning Reviewed:  (Patient does not have a health care directive)          Communication Assessment  Patient's communication style: spoken language (English or Bilingual)    Hearing Difficulty or Deaf: no   Wear Glasses or Blind: no    Cognitive  Cognitive/Neuro/Behavioral: WDL        Orientation: oriented x 4     Best Language: 0 - No aphasia  Speech: clear, spontaneous, logical    Living Environment:   People in home:  (Patient and wife)     Current living Arrangements: house      Able to return to prior arrangements: yes       Family/Social Support:  Care provided by: spouse/significant other  Provides care for: no one  Marital Status:   Wife, Children   (Dianna)       Description of Support System: Supportive    Support Assessment: Adequate family and caregiver support    Current Resources:   Patient receiving home care services: No     Community Resources:  (Disability parking certificate)  Equipment currently used at home:  (owns:  Cane, urinal, heightend toilet, hh shower nozzle)  Supplies currently used at home:      Employment/Financial:  Employment Status: retired     Employment/ Comments:  (Patient did not serve in the )  Financial Concerns: No concerns identified   Referral to Financial Counselor: No       Lifestyle & Psychosocial Needs:  Social Determinants of Health     Tobacco Use: Low Risk      Smoking Tobacco Use: Never Smoker     Smokeless Tobacco Use: Never Used   Alcohol Use:      Frequency of Alcohol Consumption:      Average Number of Drinks:      Frequency of Binge Drinking:     Financial Resource Strain:      Difficulty of Paying Living Expenses:    Food Insecurity:      Worried About Running Out of Food in the Last Year:      Ran Out of Food in the Last Year:    Transportation Needs:      Lack of Transportation (Medical):      Lack of Transportation (Non-Medical):    Physical Activity:      Days of Exercise per Week:      Minutes of Exercise per Session:    Stress:      Feeling of Stress :    Social Connections:      Frequency of Communication with Friends and Family:      Frequency of Social Gatherings with Friends and Family:      Attends Mormon Services:      Active Member of Clubs or Organizations:      Attends Club or Organization Meetings:      Marital Status:    Intimate Partner Violence:      Fear of Current or Ex-Partner:      Emotionally Abused:      Physically Abused:      Sexually Abused:    Depression: Not at risk     PHQ-2 Score: 1   Housing Stability:      Unable to Pay for Housing in the Last Year:      Number of Places Lived in the Last Year:      Unstable Housing in the Last Year:        Functional Status:  Prior to admission patient needed assistance:   Dependent ADLs::  (Indep with mobility and adl's 4 weeks ago)  Dependent IADLs::  (wife completes IADL's)       Mental Health Status:  Mental Health Status:  (no past/present MI history)       Chemical Dependency Status:  Chemical Dependency Status:  (No past/present CD history)             Values/Beliefs:  Spiritual, Cultural Beliefs, Mormon Practices, Values that affect care: yes  Description of Beliefs that Will Affect Care: Presbytarian        Values/Beliefs Comment:  (Presbyterian)    Additional Information:  MAVIS met with pt and wife to complete initial assessment.  Pt lives with his wife [place] in [place].  There are 2 steps (with bilateral handrails) to enter the single family home.  Pt's bed, bath (tub/shower combo and walk in shower), and laundry are on the main floor.  Pt and wife indicate that 4 weeks  ago, pt was indep with all mobility (no assistive device, 3 falls in the past year without fracture) and adl's.  Both state that pt has been gradually declining in the past 4 weeks and wife has started to need to provide assistance.  Pt and wife state that they learned that pt was having strokes that they were not aware of.  Pt's wife completes the housekeeping, laundry, cooking and shopping tasks.  Pt was indep with medication administration and household financial mgnt.  Pt drives.  Pt owns a cane, heightened toilet, urinal and a hh shower nozzle that is placed in the tub/shower combo.  Pt has a handicapped parking certificate.  Pt was not utilizing add'l community resources.  Pt does not have a known .  Pt's income consists of Social Security.  Pt did not serve in the .  Pt indicates that his [employer] kaylee is important to him.  Pt's primary care physician is Dr. Pedro Srivastava who offices at [facility] in [facility].   Pt has not had add'l hospitalizations in the past 30 days.   Pt indicates that he does not have past/present history of MI/CD.   Pt support system includes:  - Wife (Dianna), who is retired  - Adult children all of whom reside in [place].  Pt states that he received the Moderna vaccine.    Disposition planning was discussed.  PMR is recommending ARU placement and per Dr. Leroy, pt is medically ready for discharge.   provided pt with a medicare care compare list and pt identified the following facility preferences (in order of preference):    1.  [facility],  phoned Admissions (Girish [phone]) and per discussion, they have no openings before 11/1/2021  2.  [facility], [place] phoned Admissions (Frida [phone]) who indicates that they are full until week of 11/1/2021  3.  [facility], [place] phoned Admissions (Abigail [phone]) who indicates that they can accept pt for admit on 10/29/2021.     will coordinate  discharge.    BARBARA Salamanca  Social Work, 6A  Phone:  [phone]  Pager:  [phone]  10/28/2021        ADAM Gold

## 2021-10-28 NOTE — PLAN OF CARE
Status: Admit for gait instability- found subacute L pontine stroke  Vitals: VSS, RA. CCM-NSR  Neuros: A&O x4. Strength 5/5 throughout. Denies N/T.  IV: PIV SL    Resp/trach: WNL  Diet: Regular diet, good intake.  Bowel status: BS+, LBM 10/24. Senna given.   : Voiding spontaneously   Skin: Intact  Pain: Denies  Activity: 1 GB walker.   Plan: Stroke PLC not complete yet-called PLC and will schedule at rehab. Discharge to  ARU tomorrow at 11:15am. Continue stroke teachback with patient. Continue to monitor and follow current POC.      Stroke Education Note    The following information has been reviewed with the patient:    1. Warning signs of stroke    2. Calling 911 if having warning signs of stroke    3. All modifiable risk factors: hypertension, CAD, atrial fib, diabetes, hypercholesterolemia, smoking, substance abuse, diet, physical inactivity, obesity, sleep apnea    4. Patient's risk factors for stroke which include: HTN, Diabetes 2, HLD    5. Follow-up plan for after discharge    6. Discharge medications which include: Plavix, aspirin and atoravastatin    In addition, the above information was given to the patient in writing as a part of the Understanding Stroke Handbook.     Learner's response to risk factors / lifestyle modification education: Desire to change Reasons to change Committment to change     Grayson Ortiz RN

## 2021-10-28 NOTE — PROGRESS NOTES
Care Management Discharge Note    Discharge Date: 10/29/2021     Discharge Disposition:  [facility] ([phone])    Discharge Services:  Acute rehab placement at [facility]    Discharge DME:  Not applicable    Discharge Transportation:   [facility] (Jenni [phone]) to provide w/c transport at 11:15am    Private pay costs discussed:  Not applicable    PAS Confirmation Code:  Not required as pt is admitting to ARU setting  Patient/family educated on Medicare website which has current facility and service quality ratings: yes (Provided Medicare Care Compare list)    Education Provided on the Discharge Plan:  yes  Persons Notified of Discharge Plans: pt, wife, 6A nursing and Dr. Leroy  Patient/Family in Agreement with the Plan: yes    Handoff Referral Completed: Yes    Additional Information:  - Dr Leroy has confirmed readiness for discharge  - Admissions (Abigail) at [facility] has confirmed acceptance for Admissions  - SW provided a [facility] brochure to pt's wife  - pt's wife is currently staying locally at [facility] but plans to return to [place] during pt's rehab stay.  Family will plan to transport pt to home from ARU when pt is medically ready for discharge.    BARBARA Salamanca  Social Work, 6A  Phone:  [phone]  Pager:  [phone]  10/28/2021            DAAM Gold

## 2021-10-28 NOTE — PLAN OF CARE
Shift: [id number]    Status: Admitted 10/26 to ED with gait instability, found subacute L pontine stroke, outside window for interventions. Transferred to floor 10/27. History of DM 2, HLD, cerebellar arachnoid cyst, peripheral neuropathy, bilateral knee replacement.     Neuro: Alert and oriented x4, able to make needs known, calls appropriately. Strengths 5/5 throughout. Denies N/T.   Cardiac/VS: on tele with Sinus arrhythmia with PACs, otherwise VSS  Respiratory: RA  GI: Denies N/V  Diet/Appetite: Regular  : Voids spontaneously via urinal   Activity: Ax1 w/ GB and walker   Pain: Denies pain  Skin: No new deficits   LDA(s): PIV SL      Plan: Continue plan of care, plan to discharge to ARU.

## 2021-10-28 NOTE — INTERIM SUMMARY
[facility] Pre-Admission Screen    Referral Source:  [facility] [address] [facility] [address]  Admit date to referring facility: 10/26/2021    Physical Medicine and Rehab Consult Completed: Yes, PMR consult completed by PMR resident Marino Olivarez 10/27 w/ ARC recommended    Rehab Diagnosis:    Stroke Ischemic 01.2 (R) Body Involvement (L) Brain: L pontine stroke    Justification for Acute Inpatient Rehabilitation  Mr. Jose Mallory is a 76 year old man with a history of DMII, HLD, HTN, cerebellar arachnoid cyst, chronic lacunes, peripheral neuropathy, bilateral knee replacement, who has had approximately 2 years of slowly progressive gait instability.  He has had several falls during this time. He was referred by his PCP to see NSG for arachnoid cyst where he mentioned acute decline in his gait in past 1 week and was sent to ER for further evaluation.  MRI reveals subacute left pontine infarct. Pt also had a right pontine stroke in September 2021. 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 Loaded with plavix with plan for DAPT.  He is medically stable and ready to discharge to acute inpatient rehab.   The patient requires an intensive inpatient rehab program to address the following acute impairments: impaired balance, impaired strength, impaired coordination, impaired activity tolerance, dysmetria, impaired sensation, and truncal ataxia. These impairments are contributing to functional limitations, specifically impacting his safety and functional independence w/ transfers, gait, stairs, ADLs, and IADLs.     Current Active Medical Management Needs/Risks for Clinical Complications  The patient requires the high level of rehabilitation physician supervision that accompanies the provision of intensive rehabilitation therapy.  The patient needs the services of the rehabilitation  physician to assess the patient medically and functionally and to modify the course of treatment as needed to maximize the patient's capacity to benefit from the rehabilitation process.  The patient requires medical mgmt and assessment of:    Neurologic status: assess for neurologic recovery as pt w/ 2 recent strokes, provide stroke education and risk factor reduction strategies (HLD - on Atorvastatin).  Pt also w/ neuropathy labs pending.     DAPT needs: ASA 325mg daily indefinitely, and Plavix 75mg daily for 3 months.    Diabetes mellitus: A1c 6.3 (Hgb A1c toal <7.0); on Metformin and Novolog.  Pt also w/ peripheral neuropathy.    HTN: Enalapril. Goal of normotension 120/80.    Bowels d/t constipation: Senna-Docusate.     The patient is at risk for DVT/VTE (Lovenox), as well as falls with or without injury in setting of gait instability, impaired balance, and impaired coordination. He is also at risk for further strokes, as well as sleep and mood disorders in setting of CVA.     Past Medical/Surgical History  Surgery in the past 100 days: No  Additional relevant past medical history: DMII, HLD, HTN, cerebellar arachnoid cyst, chronic lacunes, peripheral neuropathy, bilateral knee replacement, History of ruptured diverticulum requiring surgery.    Level of Functioning Prior to Admission:  LIVING ENVIRONMENT  People in home: spouse  Current Living Arrangements: house  Home Accessibility: stairs to enter home   Number of Stairs, Main Entrance: 2   Stair Railings, Main Entrance: railings on both sides of stairs   Transportation Anticipated: car, drives self, family or friend will provide  Living Environment Comments: Pt [place], all needs met on main level.    SELF-CARE  Usual Activity Tolerance: moderate  Regular Exercise: No  Equipment Currently Used at Home: cane, heightened toilet, HH shower nozzle   Activity/Exercise/Self-Care Comment: Pt and wife indicate that 4 weeks ago, pt was IND with  all mobility (no assistive device) and ADLs.  Both state the pt has been gradually declining in the past 4 weeks and wife has started to need to provide assistance. Pt has had 2 year period of declining activity tolerance and gait instability with further rapid decline in past 1-2 weeks. Pt has been using a 4WW for the past 2 weeks, was using a cane for 1 week prior to that.   Pt's wife completes the housekeeping, laundry, cooking and shopping tasks.  Pt was IND w/ medication administration and household financial mgnt. Pt drives.       DISABILITY/FUNCTION  Concentrating, Remembering or Making Decisions Difficulty: no  Difficulty Communicating: no  Difficulty Eating/Swallowing: no  Walking or Climbing Stairs Difficulty: no  Dressing/Bathing Difficulty: no  Toileting issues: no  Doing Errands Independently Difficulty (such as shopping): no  Fall history within last six months: yes; # of times patient has fallen within last six months: 3  Change in Functional Status Since Onset of Current Illness/Injury: yes    Level of Function: GG Scale (Section GG Functional Ability and Goals; CMS's TAYLOR Version 3.0 Manual effective 10.1.2019):  PT Current Function Goals for Rehab   Bed Rolling 3 Partial/moderate assistance 6 Independent   Supine to Sit 3 Partial/moderate assistance 6 Independent   Sit to Stand 4 Supervision or touching assitance 6 Independent   Transfer 4 Supervision or touching assitance 6 Independent   Ambulation 3 Partial/moderate assistance 6 Independent   Stairs Not completed 6 Independent     OT Current Function Goals for Rehab   Feeding 6 Independent 6 Independent   Grooming 4 Supervision or touching assitance 6 Independent   Bathing Not completed 6 Independent   Upper Body Dressing Not completed 6 Independent   Lower Body Dressing Not completed 6 Independent   Toileting 3 Partial/moderate assistance 6 Independent   Toilet Transfer 3 Partial/moderate assistance 6 Independent   Tub/Shower Transfer Not  completed 6 Independent   Cognition Not Assessed Independent     SLP Current Function Goals for Rehab   Swallow Not Impaired Not applicable   Communication Not Impaired Not applicable       Current Diet:  0-Thin and 7-Regular/easy to chew    Summary Statement:  Pt ambulates 220 ft w/ 4WW w/ min A w/ variable step length and shuffling gait noted. Pt demos progressive increase in flexed posture and UE support on 4WW during gait. He also demos significant decrease in pace w/ turns and needs cues for safe 4WW management. Gait deteriorates w/ fatigue and with any dual tasking activities. He demos greater impairments w/ LLE and freezing episodes w/ fatigue. He performs supine > sit w/ mod A w/ verbal cues for technique. Pt w/ frequent posterior trunk lean when sitting unsupported, requiring min A. Pt performed STS to WW w/ CGA w/ appropriate recall of safe hand placement noted. He needs min A for a toilet transfer w/ impaired eccentric control noted and multiple cues for safety needed d/t urgency. He requires A for balance while completing pericares and clothing mgmt.  He performs standing g/h tasks w/ SBA w/ impaired LUE strength and coordination noted. He requires a full assessment of ADLs while admitted to acute inpatient rehab.     Expected Therapies and Services Required During Inpatient Rehab Admission  Intensity of Therapy: Patient requires intensive therapies not available in a lesser level of care. Patient is motivated, making gains, and can tolerate 3 hours of therapy a day.  Physical Therapy: 90 minutes per day, 7 days a week for 8 days  Occupational Therapy: 90 minutes per day, 7 days a week for 8 days  Speech and Language Therapy: No SLP needs anticipated  Rehabilitation Nursing Needs: Patient requires 24 hour Rehab Nursing to manage bowel program, vitals, medication education, carryover of new rehab techniques, care coordination, skin integrity, blood sugar management, diabetes education, assess neurologic  status, stroke education and provide safe environment for patient at falls risk.    Precautions/restrictions/special needs:  Precautions: fall precautions  Restrictions: none  Special Needs: none  Designated Visitor: wife Dianna    Expected Level of Improvement: Pt will achieve a level of modified independence w/ all transfers, gait, stairs, and ADLs in order to disch home safely.   Expected Length of time to achieve: 8 days    Anticipated Discharge Needs:  Anticipated Discharge Destination: Home  Anticipated Discharge Support: Family member  24/7 support available : Unknown  Identified caregiver(s):  Pt w/ multiple family members nearby at discharge, including daughter-in-law who is a PT.   Anticipated Discharge Needs: Home with outpatient therapy   Pt to have outpatient EMG to evaluate extent of peripheral neuropathy.    Identified challenges/barriers: none    Rehab Admission Liaison Signature/Date/Time:    Physician statement of review and agreement:  I have reviewed and am in agreement of the need for IRF stay to address above functional and medical needs. In addition to above statements address, Patient requires intensive active and ongoing therapeutic intervention and multiple therapies; Patient requires medical supervision; Expected to actively participate in the intensive rehab program; Sufficiently stable to actively participate; Expectation for measurable improvement in functional capacity or adaption to impairments.    Physician Signature/Date/Time:

## 2021-10-28 NOTE — PLAN OF CARE
Speech Language Therapy Discharge Summary    Reason for therapy discharge:    All goals and outcomes met, no further needs identified.    Progress towards therapy goal(s). See goals on Care Plan in [facility] electronic health record for goal details.  Goals met    Therapy recommendation(s):    No further therapy is recommended. Pt is tolerating regular diet with thin liquids and is able to implement safe swallowing strategies independently. Speech, language, and cognition appear WFL per informal assessment.

## 2021-10-29 ENCOUNTER — HOSPITAL ENCOUNTER (INPATIENT)
Facility: CLINIC | Age: 76
LOS: 26 days | Discharge: HOME HEALTH | DRG: 056 | End: 2021-11-24
Attending: PHYSICAL MEDICINE & REHABILITATION | Admitting: PHYSICAL MEDICINE & REHABILITATION
Payer: MEDICARE

## 2021-10-29 ENCOUNTER — APPOINTMENT (OUTPATIENT)
Dept: PHYSICAL THERAPY | Facility: CLINIC | Age: 76
DRG: 065 | End: 2021-10-29
Payer: MEDICARE

## 2021-10-29 VITALS
WEIGHT: 189 LBS | RESPIRATION RATE: 16 BRPM | DIASTOLIC BLOOD PRESSURE: 85 MMHG | HEART RATE: 90 BPM | TEMPERATURE: 97.5 F | OXYGEN SATURATION: 95 % | SYSTOLIC BLOOD PRESSURE: 121 MMHG | BODY MASS INDEX: 28.32 KG/M2

## 2021-10-29 DIAGNOSIS — F43.21 SITUATIONAL DEPRESSION: ICD-10-CM

## 2021-10-29 DIAGNOSIS — I63.9 LEFT PONTINE STROKE (H): Primary | ICD-10-CM

## 2021-10-29 DIAGNOSIS — K59.00 CONSTIPATION, UNSPECIFIED CONSTIPATION TYPE: ICD-10-CM

## 2021-10-29 DIAGNOSIS — R26.9 GAIT DIFFICULTY: ICD-10-CM

## 2021-10-29 DIAGNOSIS — R53.1 WEAKNESS: ICD-10-CM

## 2021-10-29 DIAGNOSIS — Z00.00 PREVENTATIVE HEALTH CARE: ICD-10-CM

## 2021-10-29 DIAGNOSIS — E11.65 TYPE 2 DIABETES MELLITUS WITH HYPERGLYCEMIA, WITHOUT LONG-TERM CURRENT USE OF INSULIN (H): ICD-10-CM

## 2021-10-29 DIAGNOSIS — I10 BENIGN ESSENTIAL HYPERTENSION: ICD-10-CM

## 2021-10-29 DIAGNOSIS — I63.9 CEREBROVASCULAR ACCIDENT (CVA), UNSPECIFIED MECHANISM (H): ICD-10-CM

## 2021-10-29 LAB
ALBUMIN SERPL ELPH-MCNC: 4.2 G/DL (ref 3.7–5.1)
ALPHA1 GLOB SERPL ELPH-MCNC: 0.3 G/DL (ref 0.2–0.4)
ALPHA2 GLOB SERPL ELPH-MCNC: 1 G/DL (ref 0.5–0.9)
B-GLOBULIN SERPL ELPH-MCNC: 0.9 G/DL (ref 0.6–1)
GAMMA GLOB SERPL ELPH-MCNC: 1.3 G/DL (ref 0.7–1.6)
GLUCOSE BLDC GLUCOMTR-MCNC: 130 MG/DL (ref 70–99)
GLUCOSE BLDC GLUCOMTR-MCNC: 154 MG/DL (ref 70–99)
GLUCOSE BLDC GLUCOMTR-MCNC: 162 MG/DL (ref 70–99)
GLUCOSE BLDC GLUCOMTR-MCNC: 184 MG/DL (ref 70–99)
M PROTEIN SERPL ELPH-MCNC: 0 G/DL
PROT PATTERN SERPL ELPH-IMP: ABNORMAL
RPR SER QL: NONREACTIVE

## 2021-10-29 PROCEDURE — 97530 THERAPEUTIC ACTIVITIES: CPT | Mod: GP

## 2021-10-29 PROCEDURE — 84165 PROTEIN E-PHORESIS SERUM: CPT | Mod: 26 | Performed by: PATHOLOGY

## 2021-10-29 PROCEDURE — 250N000011 HC RX IP 250 OP 636: Performed by: STUDENT IN AN ORGANIZED HEALTH CARE EDUCATION/TRAINING PROGRAM

## 2021-10-29 PROCEDURE — 250N000013 HC RX MED GY IP 250 OP 250 PS 637: Performed by: STUDENT IN AN ORGANIZED HEALTH CARE EDUCATION/TRAINING PROGRAM

## 2021-10-29 PROCEDURE — 99223 1ST HOSP IP/OBS HIGH 75: CPT | Mod: AI | Performed by: PHYSICAL MEDICINE & REHABILITATION

## 2021-10-29 PROCEDURE — 99238 HOSP IP/OBS DSCHRG MGMT 30/<: CPT | Mod: GC | Performed by: PSYCHIATRY & NEUROLOGY

## 2021-10-29 PROCEDURE — 128N000003 HC R&B REHAB

## 2021-10-29 PROCEDURE — 250N000012 HC RX MED GY IP 250 OP 636 PS 637: Performed by: PHYSICIAN ASSISTANT

## 2021-10-29 PROCEDURE — 250N000013 HC RX MED GY IP 250 OP 250 PS 637: Performed by: PHYSICIAN ASSISTANT

## 2021-10-29 PROCEDURE — 97116 GAIT TRAINING THERAPY: CPT | Mod: GP

## 2021-10-29 PROCEDURE — 250N000013 HC RX MED GY IP 250 OP 250 PS 637

## 2021-10-29 RX ORDER — ACETAMINOPHEN 325 MG/1
325-650 TABLET ORAL EVERY 6 HOURS PRN
Status: DISCONTINUED | OUTPATIENT
Start: 2021-10-29 | End: 2021-11-24 | Stop reason: HOSPADM

## 2021-10-29 RX ORDER — ASPIRIN 325 MG
325 TABLET ORAL DAILY
Status: DISCONTINUED | OUTPATIENT
Start: 2021-10-30 | End: 2021-11-24 | Stop reason: HOSPADM

## 2021-10-29 RX ORDER — ATORVASTATIN CALCIUM 40 MG/1
40 TABLET, FILM COATED ORAL EVERY EVENING
Qty: 90 TABLET | Refills: 0 | DISCHARGE
Start: 2021-10-29 | End: 2022-01-27

## 2021-10-29 RX ORDER — ASPIRIN 81 MG/1
324 TABLET, CHEWABLE ORAL DAILY
Qty: 360 TABLET | Refills: 0 | Status: ON HOLD | DISCHARGE
Start: 2021-10-29 | End: 2021-11-23

## 2021-10-29 RX ORDER — ENALAPRIL MALEATE 5 MG/1
5 TABLET ORAL DAILY
Status: ON HOLD | DISCHARGE
Start: 2021-10-29 | End: 2021-11-23

## 2021-10-29 RX ORDER — ATORVASTATIN CALCIUM 40 MG/1
40 TABLET, FILM COATED ORAL EVERY EVENING
Status: DISCONTINUED | OUTPATIENT
Start: 2021-10-29 | End: 2021-11-24 | Stop reason: HOSPADM

## 2021-10-29 RX ORDER — NICOTINE POLACRILEX 4 MG
15-30 LOZENGE BUCCAL
Status: DISCONTINUED | OUTPATIENT
Start: 2021-10-29 | End: 2021-11-24 | Stop reason: HOSPADM

## 2021-10-29 RX ORDER — DEXTROSE MONOHYDRATE 25 G/50ML
25-50 INJECTION, SOLUTION INTRAVENOUS
Status: DISCONTINUED | OUTPATIENT
Start: 2021-10-29 | End: 2021-11-24 | Stop reason: HOSPADM

## 2021-10-29 RX ORDER — PANTOPRAZOLE SODIUM 40 MG/1
40 TABLET, DELAYED RELEASE ORAL
Status: DISCONTINUED | OUTPATIENT
Start: 2021-10-30 | End: 2021-11-24 | Stop reason: HOSPADM

## 2021-10-29 RX ORDER — ENALAPRIL MALEATE 5 MG/1
5 TABLET ORAL DAILY
Status: DISCONTINUED | OUTPATIENT
Start: 2021-10-30 | End: 2021-11-01

## 2021-10-29 RX ORDER — POLYETHYLENE GLYCOL 3350 17 G/17G
17 POWDER, FOR SOLUTION ORAL DAILY PRN
Status: DISCONTINUED | OUTPATIENT
Start: 2021-10-29 | End: 2021-11-04

## 2021-10-29 RX ORDER — CLOPIDOGREL BISULFATE 75 MG/1
75 TABLET ORAL DAILY
Status: DISCONTINUED | OUTPATIENT
Start: 2021-10-30 | End: 2021-11-24 | Stop reason: HOSPADM

## 2021-10-29 RX ORDER — AMOXICILLIN 250 MG
1-2 CAPSULE ORAL 2 TIMES DAILY PRN
Status: DISCONTINUED | OUTPATIENT
Start: 2021-10-29 | End: 2021-11-24 | Stop reason: HOSPADM

## 2021-10-29 RX ORDER — CLOPIDOGREL BISULFATE 75 MG/1
75 TABLET ORAL DAILY
Qty: 90 TABLET | Refills: 0 | Status: ON HOLD | DISCHARGE
Start: 2021-10-29 | End: 2021-11-23

## 2021-10-29 RX ADMIN — METFORMIN HYDROCHLORIDE 1000 MG: 500 TABLET ORAL at 17:57

## 2021-10-29 RX ADMIN — METFORMIN HYDROCHLORIDE 1000 MG: 500 TABLET ORAL at 08:40

## 2021-10-29 RX ADMIN — CLOPIDOGREL BISULFATE 75 MG: 75 TABLET ORAL at 08:40

## 2021-10-29 RX ADMIN — INSULIN ASPART 1 UNITS: 100 INJECTION, SOLUTION INTRAVENOUS; SUBCUTANEOUS at 17:57

## 2021-10-29 RX ADMIN — ATORVASTATIN CALCIUM 40 MG: 40 TABLET, FILM COATED ORAL at 20:43

## 2021-10-29 RX ADMIN — ASPIRIN 81 MG CHEWABLE TABLET 324 MG: 81 TABLET CHEWABLE at 08:40

## 2021-10-29 RX ADMIN — ENALAPRIL MALEATE 5 MG: 5 TABLET ORAL at 08:40

## 2021-10-29 RX ADMIN — ENOXAPARIN SODIUM 40 MG: 40 INJECTION SUBCUTANEOUS at 08:41

## 2021-10-29 ASSESSMENT — ACTIVITIES OF DAILY LIVING (ADL)
ADLS_ACUITY_SCORE: 9
ADLS_ACUITY_SCORE: 3
ADLS_ACUITY_SCORE: 9
ADLS_ACUITY_SCORE: 9
ADLS_ACUITY_SCORE: 3
ADLS_ACUITY_SCORE: 3
ADLS_ACUITY_SCORE: 9
ADLS_ACUITY_SCORE: 12
ADLS_ACUITY_SCORE: 10
ADLS_ACUITY_SCORE: 12
ADLS_ACUITY_SCORE: 7
ADLS_ACUITY_SCORE: 12
ADLS_ACUITY_SCORE: 9
ADLS_ACUITY_SCORE: 12

## 2021-10-29 ASSESSMENT — MIFFLIN-ST. JEOR: SCORE: 1608.95

## 2021-10-29 ASSESSMENT — PATIENT HEALTH QUESTIONNAIRE - PHQ9: SUM OF ALL RESPONSES TO PHQ QUESTIONS 1-9: 3

## 2021-10-29 NOTE — PLAN OF CARE
Occupational Therapy Discharge Summary    Reason for therapy discharge:    Discharged to acute rehabilitation facility.    Progress towards therapy goal(s). See goals on Care Plan in [facility] electronic health record for goal details.  Goals partially met.  Barriers to achieving goals:   discharge from facility.    Therapy recommendation(s):    Continued therapy is recommended.  Rationale/Recommendations:  Patient would benefit from ongoing therapies to promote increased ADL independence.

## 2021-10-29 NOTE — H&P
[facility]   Acute Rehabilitation Unit  Admission History and Physical    CHIEF COMPLAINT   Stroke Ischemic 01.2 (R) Body Involvement (L) Brain: L pontine stroke     HISTORY OF PRESENT ILLNESS  Jose Mallory is a 76 year old right hand dominant male with past medical history of diabetes mellitus type II, hypertension, hyperlipidemia, GERD, posterior fossa arachnoid cyst, peripheral neuropathy, prior R pontine stroke, gait instability, and bilateral knee replacement who presented to ED on 10/26 with a several months of progressive lower extremity weakness and gait difficulties, which had acutely worsened over ~1 week prior to admission.  He had been seen by neurosurgery as an outpatient, who referred him to ED for further evaluation.  MRI cervical spine with moderate spinal canal stenosis at C4-5 and bilateral moderate to severe neural foraminal stenosis.  MRI thoracic spine without abnormality, lumbar spine with multilevel degenerative changes, moderate to severe spinal canal narrowing at L3-4 and mild to moderate bilateral neural foraminal narrowing.  MRI brain revealed subacute cerebral infarction in left ventral cookie.  Head MRA with short segmental moderate narrowing of mid basilar artery.  Normal neck MRA.  Patient was outside the treatment window for TPA.  Neurosurgery was consulted, but no neurosurgical intervention indicated at this time.  He was loaded with Plavix and then continued on DAPT and high intensity statin.  TTE revealed EF 60-65%, no significant valvular disease or cardiac source for embolus.  Etiology of stroke suspected to be atherosclerotic.    Patient was also seen by general neurology during his stay for neuropathy work-up.  He was found to have decreased vibratory sense in bilateral lower extremities.  Suspected to be related to diabetes, although some neuropathy labs outstanding and planning for EMG for further evaluation as outpatient.    During  acute hospitalization, patient was seen and evaluated by PT and OT, as well as PM&R consult service, who collectively recommended that patient would benefit from ongoing therapies in the acute inpatient rehabilitation setting.      In review of the therapy notes, patient ambulates 220 ft w/ 4WW w/ min A w/ variable step length and shuffling gait noted. Pt demos progressive increase in flexed posture and UE support on 4WW during gait. He also demos significant decrease in pace w/ turns and needs cues for safe 4WW management. Gait deteriorates w/ fatigue and with any dual tasking activities. He demos greater impairments w/ LLE and freezing episodes w/ fatigue. He performs supine > sit w/ mod A w/ verbal cues for technique. Pt w/ frequent posterior trunk lean when sitting unsupported, requiring min A. Pt performed STS to WW w/ CGA w/ appropriate recall of safe hand placement noted. He needs min A for a toilet transfer w/ impaired eccentric control noted and multiple cues for safety needed d/t urgency. He requires A for balance while completing pericares and clothing mgmt.  He performs standing g/h tasks w/ SBA w/ impaired LUE strength and coordination noted. He requires a full assessment of ADLs while admitted to acute inpatient rehab.     Upon arrival to the rehab unit, patient reports that he is feeling tired.  He notes ongoing weakness in L>R legs and left foot drop.  He notes some generalized weakness that he attributes to decreased activity lately given progressive issues with balance and gait.  He notes some urgency of bowel and bladder and states bowel movements have become less frequent than was typical for him.  He notes decreased interest in food over the past year or so, though no significant weight loss.  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    PAST MEDICAL HISTORY   Reviewed and updated in Epic.  No past medical history  on file.    SURGICAL HISTORY  Reviewed and updated in Epic.  No past surgical history on file.    SOCIAL HISTORY  Reviewed and updated in Epic.  Marital Status:   Living situation: lives with wife in house with 3 RAJAT and all needs on main  Family support: supportive wife, 2 sons nearby  Vocational History: retired  Tobacco use: denies  Alcohol use: very infrequent  Illicit drug use: denies  Social History     Socioeconomic History     Marital status:      Spouse name: Not on file     Number of children: Not on file     Years of education: Not on file     Highest education level: Not on file   Occupational History     Not on file   Tobacco Use     Smoking status: Never Smoker     Smokeless tobacco: Never Used   Substance and Sexual Activity     Alcohol use: Not on file     Drug use: Not on file     Sexual activity: Not on file   Other Topics Concern     Not on file   Social History Narrative     Not on file     Social Determinants of Health     Financial Resource Strain:      Difficulty of Paying Living Expenses:    Food Insecurity:      Worried About Running Out of Food in the Last Year:      Ran Out of Food in the Last Year:    Transportation Needs:      Lack of Transportation (Medical):      Lack of Transportation (Non-Medical):    Physical Activity:      Days of Exercise per Week:      Minutes of Exercise per Session:    Stress:      Feeling of Stress :    Social Connections:      Frequency of Communication with Friends and Family:      Frequency of Social Gatherings with Friends and Family:      Attends Caodaism Services:      Active Member of Clubs or Organizations:      Attends Club or Organization Meetings:      Marital Status:    Intimate Partner Violence:      Fear of Current or Ex-Partner:      Emotionally Abused:      Physically Abused:      Sexually Abused:        FAMILY HISTORY  Reviewed and updated in Epic.  No family history on file.      PRIOR FUNCTIONAL HISTORY   Patient was having  gait difficulties over approximately the past 2 years.  He had been using a cane for ambulation for several months, though with progressive symptoms in weeks preceding his admission, he had required use of a walker.  He also required chair lift and hadn't been accessing his basement for at least the past week before presentation.  He reports history of 4 falls within the past 6 months.    MEDICATIONS  Scheduled meds  Medications Prior to Admission   Medication Sig Dispense Refill Last Dose     aspirin (ASA) 81 MG chewable tablet Take 4 tablets (324 mg) by mouth daily 360 tablet 0      atorvastatin (LIPITOR) 40 MG tablet Take 1 tablet (40 mg) by mouth every evening 90 tablet 0      clopidogrel (PLAVIX) 75 MG tablet 1 tablet (75 mg) by Oral or NG Tube route daily 90 tablet 0      enalapril (VASOTEC) 5 MG tablet Take 1 tablet (5 mg) by mouth daily        glipiZIDE (GLUCOTROL XL) 10 MG 24 hr tablet Take 10 mg by mouth daily        metFORMIN (GLUCOPHAGE) 1000 MG tablet Take 1,000 mg by mouth 2 times daily (with meals)          ALLERGIES     Allergies   Allergen Reactions     Rocephin [Ceftriaxone]      Passing out.         REVIEW OF SYSTEMS  Constitutional: Positive for fatigue and generalized weakness.  Negative for fever/chills.  Eyes: Negative for visual changes.  Ears, Nose, Throat: Negative for nasal congestion, sore throat.  Cardiovascular: Negative for chest pain, palpitations.  Respiratory: Negative for shortness of breath, cough.  Gastrointestinal: Positive for decreased frequency of bowel movements (now every other day or less), but not hard to straining.  Also positive for decreased appetite and less interest in food.  Denies abdominal pain, nausea/vomiting, diarrhea.  Genitourinary: Positive for urgency.  Negative for dysuria, frequency, hesitancy, incomplete emptying.  Musculoskeletal: Positive for bilateral knee pain.  Neurologic: Positive for L>RLE weakness.  Negative for numbness/tingling, headache,  "dizziness.  Negative for speech, swallow difficulties, cognitive changes.  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      PHYSICAL EXAM  VITAL SIGNS:  There were no vitals taken for this visit.  BMI:  Estimated body mass index is 28.32 kg/m  as calculated from the following:    Height as of 10/26/21: 1.74 m (5' 8.5\").    Weight as of 10/27/21: 85.7 kg (189 lb).     General: NAD, lying in bed, pleasant and cooperative  HEENT: NC/AT, MMM  Pulmonary: non-labored on room air, lungs CTA bilaterally  Cardiovascular: RRR, no murmurs  Abdominal: soft, non-tender, non-distended, bowel sounds present  Extremities: warm, well perfused, no edema in bilateral lower extremities, no tenderness in calves  MSK/neuro:   Mental Status:  alert and oriented x3   Cranial Nerves:   1. 2nd CN: Pupils equal, round, reactive to light and accomodation. and visual fields intact to confrontation.   2. 3rd,4th,6th CN:  EOMI, appropriate pupillary responses  3. 5th CN: facial sensation intact   4. 7th CN: slight left facial droop   5. 8th CN: functional hearing bilaterally  6. 9th, 10th CN: palate elevates symmetrically   7. 11th CN: sternocleidomastoids and trapezii strong   8. 12th CN: tongue midline and without fasciculations     Sensory: Normal to light touch in bilateral upper and lower extremities   Strength:    SF  EF  EE  WE  G HF  KE  DF  EHL  PF   R  5 5 5 5 4 4 4 4 4 4  L  5 5 5 5 5 4- 4- 2 2 2    Reflexes: unable to elicit reflexes at bilateral knees or Achilles   Patterson's test: negative bilaterally    Babinski reflex: downgoing bilaterally    Tone per modified Jj Scale: 0/4   Abnormal movements: None    Coordination: finger to nose with mild dysmetria bilaterally, ataxic left heel/shin slide   Speech: clear/fluent   Cognition: intact to conversation   Gait: not tested  Skin: no rashes, " ecchymoses, lesions on visible skin      LABS  CBC RESULTS: Recent Labs   Lab Test 10/28/21  0654 10/27/21  0558 10/26/21  1456   WBC 6.8 7.3 8.4   RBC 4.78 4.60 5.08   HGB 14.6 14.1 15.7   HCT 43.1 42.6 46.5   MCV 90 93 92   MCH 30.5 30.7 30.9   MCHC 33.9 33.1 33.8   RDW 12.0 12.4 12.2    144* 169     Last Basic Metabolic Panel:  Recent Labs   Lab Test 10/29/21  0757 10/29/21  0226 10/28/21  2256 10/27/21  1813 10/27/21  1253 10/27/21  1157 10/27/21  0554 10/26/21  1456   0000   NA  --   --   --   --  136  --   --  137  --    POTASSIUM  --   --   --   --  4.0  --  4.2 4.1  --    CHLORIDE  --   --   --   --  104  --   --  105  --    CO2  --   --   --   --  19*  --   --  26  --    ANIONGAP  --   --   --   --  13  --   --  6  --    * 130* 204*   < > 182*   < >  --  76   < >   BUN  --   --   --   --  14  --   --  16  --    CR  --   --   --   --  0.86  0.86  --   --  0.86  --    GFRESTIMATED  --   --   --   --  83  83  --   --  83  --    LEON  --   --   --   --  9.2  --   --  9.0  --     < > = values in this interval not displayed.     Hemoglobin A1C   Date Value Ref Range Status   10/26/2021 6.3 (H) 0.0 - 5.6 % Final     Comment:     Normal <5.7%   Prediabetes 5.7-6.4%    Diabetes 6.5% or higher     Note: Adopted from ADA consensus guidelines.     Recent Labs   Lab Test 10/26/21  1456   CHOL 127   HDL 31*   LDL 70   TRIG 128       MR CERVICAL SPINE W/O CONTRAST 10/26/2021 7:21 PM  Impression:   Multilevel degenerative changes of the cervical spine as described above in great detail. Most significantly there is moderate spinal canal stenosis at C4-5 secondary to disc osteophyte complex and thickening of the ligamentum flavum. In addition, there is bilateral neural foraminal stenosis, severe on the right and moderate to severe on the left . No myelopathic cord signal.    Thoracic and Lumbar spine MRI without contrast  10/26/2021  Impression:   1. No definite abnormality within the thoracic spinal cord or  vertebra.   2. Multilevel degenerative changes of the lumbar spine most significant at L3-L4 with moderate to severe spinal canal narrowing and mild to moderate bilateral neural foraminal narrowing.    MR BRAIN FOR STROKE COMPLETE WITHOUT AND WITH CONTRAST 10/27/2021  IMPRESSION:  HEAD MRI:   1.  Somewhat morphologically irregular small zone of presumed subacute cerebral infarction involving the left aspect of the cookie. There is associated postcontrast enhancement which is typically seen in a later subacute stage of infarction, however, the restricted diffusion and FLAIR signal hyperintensity would suggest an early subacute infarct. No associated mass effect.  2.  Multiple chronic appearing lacunar infarcts in the bilateral coronal radiata and bilateral centrum semiovale.  3.  Underlying mild to moderate volume loss and presumed chronic small vessel ischemic changes.  HEAD MRA:   1.  No major vessel occlusion or flow-limiting stenosis.  2.  Short segmental moderate narrowing of the mid basilar artery.  3.  Scattered areas of mild intracranial atherosclerosis elsewhere.  NECK MRA:  1.  Normal neck MRA.    IMPRESSION/PLAN:  Jose Mallory is a 76 year old right hand dominant male with a past medical history of DM2, HTN, HLD, GERD, posterior fossa arachnoid cyst, peripheral neuropathy, prior R pontine stroke, gait instability, and bilateral knee replacement who was admitted on 10/26/21 with progressive lower extremity weakness and gait difficulties, found to have subacute left pontine stroke with course complicated by neuropathy.  He is now admitted to ARU on 10/29 for multidisciplinary rehabilitation and ongoing medical management.      Admission to acute inpatient rehab 10/29/21.    Impairment group code: Stroke Ischemic 01.2 (R) Body Involvement (L) Brain: L pontine stroke      1. PT and OT 90 minutes of each on a daily basis, in addition to rehab nursing and close management of physiatrist.      2. Impairment of  ADL's: Noted to have impaired balance, impaired strength, impaired coordination, impaired activity tolerance, dysmetria, impaired sensation, and truncal ataxia, all affecting his ability to safely and independently perform basic ADLs.  Goal for mod I with basic ADLs.    3. Impairment of mobility:  Noted to have impaired balance, impaired strength, impaired coordination, impaired activity tolerance, dysmetria, impaired sensation, and truncal ataxia, all affecting his ability to safely and independently perform basic mobility.  Goal for mod I with basic mobility.    4. Medical Conditions  #Subacute L pontine stroke  #Subacute to chronic R pontine stroke  #Gait instability  #Arachnoid cyst  #Moderate cervical canal stenosis with disc herniation most pronounced at C3-C4, C4-C5   MRI with subacute cerebral infarction in left ventral cookie.  TTE EF 60-65%, no significant valvular disease or cardiac source for embolus.  Telemetry with NSR and no evidence of atrial fibrillation.  IV tPA was not initiated due to unclear or unfavorable risk-benefit profile for extended window thrombolysis beyond the conventional 4.5 hour time window.  Etiology of stroke thought to be atherosclerotic.  Gait instability suspected to be multifactorial including peripheral neuropathy, cerebellar disease, and recent bilateral pontine strokes.  - Continue DAPT with Aspirin 325 mg daily and Plavix 75 mg daily x90 days, then stop Plavix and continue Aspirin 325 mg daily only  - High intensity statin (atorvastatin 40 mg daily) to target LDL <70  - HbA1c at goal (6.3%)  - Long-term BP goal to 120/80  - Continue PT/OT  - Outpatient sleep evaluation for SELIN  - Follow up with neurology in 4-6 weeks  - Per hospital discharge summary, if gait has not improved after the pt's stay at acute rehab and/or by the time there is neurology follow up in the outpatient setting, please consider additional neurosurgery consultation.    #Peripheral neuropathy  #Decreased  vibration sensation bilaterally in lower extremities  Per neurology, suspect that large fiber sensory dysfunction secondary to diabetes.  Vitamin B12/folate WNL, NEHA negative, HIV non reactive, RPR negative.   - Pending work-up: Homocysteine, MMA, Cu, Zinc and Protein electrophoresis   - Follow up with neurology for EMG     #HTN  - Continue PTA enalapril 5mg PO daily  - Monitor BP, adjust meds as indicated     #DMII  [PTA meds: metformin 1000 mg BID, glipizide 10 mg daily]  A1c 6.3%.  Patient reports compliance with medications, but that he was not taking his blood glucose at home as directed.  BG currently 130-204.  - Monitor blood glucose TID AC and HS  - Continue PTA metformin 1g BID  - PTA glipizide 10 mg held throughout inpatient admission, gradually resume during rehab stay if tolerated/indicated  - Hypoglycemia protocol  - Will need new glucometer and supplies, diabetes educator consulted    5. Adjustment to disability:  Clinical psychology to eval and treat as indicated  6. FEN: regular diet, thin liquids  7. Bowel: continent, monitor for constipation, PRN bowel meds available  8. Bladder: continent, monitor PVRs at admission, ISC at indicated  9. DVT Prophylaxis: mechanical, discontinued Lovenox at admission given ambulating adequate distances  10. GI Prophylaxis: PPI given DAPT + age  11. Code: full, confirmed on admission  12. Disposition: goal for home  13. ELOS:  7 days  14. Rehab prognosis:  good  15. Follow up Appointments on Discharge: PCP, stroke and general neurology        Patient discussed with Dr. Felipe Balderas, PM&R staff physician     Bozena Bower PA-C  Physical Medicine & Rehabilitation

## 2021-10-29 NOTE — PLAN OF CARE
Status: Subacute L pontine stroke, gait instability.  Vitals: VSS, RA. CCM.  Neuros: Intact.  IV: PIV SL.  Resp/trach: LS CTA.  Diet: Regular.  Bowel status: BS+, LBM 10/28.  : Voiding with urinal.  Skin: No deficits.   Pain: Denies.  Activity: Up with 1, GB, walker. Unsteady gait.   Social: No visitors.   Plan: Discharge to  ARU tomorrow at 11:15 AM via w/c.

## 2021-10-29 NOTE — PLAN OF CARE
Shift: [id number]     Status: Admitted 10/26 to ED with gait instability, found subacute L pontine stroke, outside window for interventions. Transferred to floor 10/27. History of DM 2, HLD, cerebellar arachnoid cyst, peripheral neuropathy, bilateral knee replacement.      Neuro: Alert and oriented x4, able to make needs known, calls appropriately. Strengths 5/5 throughout. Denies N/T.   Cardiac/VS: on tele with Sinus arrhythmia with PACs/PVCs, otherwise VSS  Respiratory: RA  GI: Denies N/V  Diet/Appetite: Regular  : Voids spontaneously via urinal   Activity: Ax1 w/ GB and walker   Pain: Denies pain  Skin: No new deficits   LDA(s): PIV SL        [facility].

## 2021-10-29 NOTE — PLAN OF CARE
Physical Therapy Discharge Summary    Reason for therapy discharge:    Discharged to acute rehabilitation facility.    Progress towards therapy goal(s). See [facility] on Care [facility] record for goal details.  Goals partially met.  Barriers to achieving goals:   discharge from facility.    Therapy recommendation(s):    Continued therapy is recommended.  Rationale/Recommendations:  to maximize functional ind.

## 2021-10-29 NOTE — LETTER
Recipient: Linda           Sender: Mabel Mckoy PH: [phone]  Discharge home Wed 11/24.   RN, PT, OT, HA and SW   Thanks!!!!           Date: November 19, 2021  Patient Name:  Jose Mallory  Routing Message:          The documents accompanying this notice contain confidential information belonging to the sender.  This information is intended only for the use of the individual or entity named above.  The authorized recipient of this information is prohibited from disclosing this information to any other party and is required to destroy the information after its stated need has been fulfilled, unless otherwise required by state law.    If you are not the intended recipient, you are hereby notified that any disclosure, copy, distribution or action taken in reliance on the contents of these documents is strictly prohibited.  If you have received this document in error, please return it by fax to [phone] with a note on the cover sheet explaining why you are returning it (e.g. not your patient, not your provider, etc.).  If you need further assistance, please call .  Documents may also be returned by mail to [employer], , [address] [facility] [address], [place], [place] [address].

## 2021-10-29 NOTE — PLAN OF CARE
Arrived on unit via wheelchair with [employer] transport. Assist of 1 with walker to transfer to bed. Alert and orient. Continent of bowel and bladder. Denies pain. QID blood sugar checks. Regular thin diet. Began admission paperwork including room orientation. Will have evening nurse continue admission process. Bed alarm on for safety, call light within reach, Ok to continue with care plan.

## 2021-10-29 NOTE — LETTER
Recipient: [facility]          Sender: Mabel Mckoy PH: [phone]  Home today, thanks!           Date: November 24, 2021  Patient Name:  Jose Mallory  Routing Message:          The documents accompanying this notice contain confidential information belonging to the sender.  This information is intended only for the use of the individual or entity named above.  The authorized recipient of this information is prohibited from disclosing this information to any other party and is required to destroy the information after its stated need has been fulfilled, unless otherwise required by state law.    If you are not the intended recipient, you are hereby notified that any disclosure, copy, distribution or action taken in reliance on the contents of these documents is strictly prohibited.  If you have received this document in error, please return it by fax to [phone] with a note on the cover sheet explaining why you are returning it (e.g. not your patient, not your provider, etc.).  If you need further assistance, please call .  Documents may also be returned by mail to [employer], , [address], [place], [place] [address].

## 2021-10-30 ENCOUNTER — APPOINTMENT (OUTPATIENT)
Dept: PHYSICAL THERAPY | Facility: CLINIC | Age: 76
DRG: 056 | End: 2021-10-30
Attending: PHYSICAL MEDICINE & REHABILITATION
Payer: MEDICARE

## 2021-10-30 ENCOUNTER — APPOINTMENT (OUTPATIENT)
Dept: OCCUPATIONAL THERAPY | Facility: CLINIC | Age: 76
DRG: 056 | End: 2021-10-30
Attending: PHYSICAL MEDICINE & REHABILITATION
Payer: MEDICARE

## 2021-10-30 LAB
A-TOCOPHEROL VIT E SERPL-MCNC: 7.4 MG/L
BETA+GAMMA TOCOPHEROL SERPL-MCNC: 0.6 MG/L
COPPER SERPL-MCNC: 95.2 UG/DL
GLUCOSE BLDC GLUCOMTR-MCNC: 124 MG/DL (ref 70–99)
GLUCOSE BLDC GLUCOMTR-MCNC: 134 MG/DL (ref 70–99)
GLUCOSE BLDC GLUCOMTR-MCNC: 150 MG/DL (ref 70–99)
ZINC SERPL-MCNC: 70.4 UG/DL

## 2021-10-30 PROCEDURE — 97530 THERAPEUTIC ACTIVITIES: CPT | Mod: GO

## 2021-10-30 PROCEDURE — 97535 SELF CARE MNGMENT TRAINING: CPT | Mod: GO

## 2021-10-30 PROCEDURE — 97166 OT EVAL MOD COMPLEX 45 MIN: CPT | Mod: GO

## 2021-10-30 PROCEDURE — 97110 THERAPEUTIC EXERCISES: CPT | Mod: GP | Performed by: PHYSICAL THERAPIST

## 2021-10-30 PROCEDURE — 99233 SBSQ HOSP IP/OBS HIGH 50: CPT | Performed by: PHYSICAL MEDICINE & REHABILITATION

## 2021-10-30 PROCEDURE — 97530 THERAPEUTIC ACTIVITIES: CPT | Mod: GP | Performed by: PHYSICAL THERAPIST

## 2021-10-30 PROCEDURE — 97162 PT EVAL MOD COMPLEX 30 MIN: CPT | Mod: GP | Performed by: PHYSICAL THERAPIST

## 2021-10-30 PROCEDURE — 97129 THER IVNTJ 1ST 15 MIN: CPT | Mod: GO

## 2021-10-30 PROCEDURE — 250N000013 HC RX MED GY IP 250 OP 250 PS 637: Performed by: PHYSICAL MEDICINE & REHABILITATION

## 2021-10-30 PROCEDURE — 97116 GAIT TRAINING THERAPY: CPT | Mod: GP | Performed by: PHYSICAL THERAPIST

## 2021-10-30 PROCEDURE — 128N000003 HC R&B REHAB

## 2021-10-30 PROCEDURE — 250N000013 HC RX MED GY IP 250 OP 250 PS 637: Performed by: PHYSICIAN ASSISTANT

## 2021-10-30 RX ADMIN — DOCUSATE SODIUM AND SENNOSIDES 1 TABLET: 8.6; 5 TABLET ORAL at 21:10

## 2021-10-30 RX ADMIN — PANTOPRAZOLE SODIUM 40 MG: 40 TABLET, DELAYED RELEASE ORAL at 06:06

## 2021-10-30 RX ADMIN — METFORMIN HYDROCHLORIDE 1000 MG: 500 TABLET ORAL at 17:23

## 2021-10-30 RX ADMIN — METFORMIN HYDROCHLORIDE 1000 MG: 500 TABLET ORAL at 07:17

## 2021-10-30 RX ADMIN — CLOPIDOGREL BISULFATE 75 MG: 75 TABLET, FILM COATED ORAL at 07:17

## 2021-10-30 RX ADMIN — ATORVASTATIN CALCIUM 40 MG: 40 TABLET, FILM COATED ORAL at 20:23

## 2021-10-30 RX ADMIN — ASPIRIN 325 MG ORAL TABLET 325 MG: 325 PILL ORAL at 07:17

## 2021-10-30 RX ADMIN — ENALAPRIL MALEATE 5 MG: 5 TABLET ORAL at 07:17

## 2021-10-30 RX ADMIN — Medication 5 MG: at 21:10

## 2021-10-30 ASSESSMENT — ACTIVITIES OF DAILY LIVING (ADL)
ADLS_ACUITY_SCORE: 8
ADLS_ACUITY_SCORE: 8
ADLS_ACUITY_SCORE: 9
ADLS_ACUITY_SCORE: 8
ADLS_ACUITY_SCORE: 8
ADLS_ACUITY_SCORE: 9
ADLS_ACUITY_SCORE: 9
ADLS_ACUITY_SCORE: 8
ADLS_ACUITY_SCORE: 9
ADLS_ACUITY_SCORE: 9
ADLS_ACUITY_SCORE: 8
ADLS_ACUITY_SCORE: 9
ADLS_ACUITY_SCORE: 9
ADLS_ACUITY_SCORE: 8
ADLS_ACUITY_SCORE: 9
ADLS_ACUITY_SCORE: 8

## 2021-10-30 NOTE — PLAN OF CARE
Patient is alert and oriented x4. Patient's last BM was 10/28, took PRN senna this shift Continent of bowel and urine, uses urinal at bedside. Denies pain. Assist of 1 with a walker and TB. Regular thin diet, patient only ordered fruit bowl for dinner. QID blood glucose checks with sliding insulin. Skin is intact. Patient had his cousin visit this evening. Patient was weak and needed 1x to  Support him when trying to sit on the edge of the bed to undress/re-dress himself for bed. Started PRN Melatonin 5mg this evening.

## 2021-10-30 NOTE — PROGRESS NOTES
"  [facility]   Acute Rehabilitation Unit  Daily progress note  CC:     Stroke Ischemic 01.2 (R) Body Involvement (L) Brain: L pontine stroke  76 year old man with a history of DMII, HLD, HTN, cerebellar arachnoid cyst, chronic lacunes, peripheral neuropathy, bilateral knee replacement, who has had approximately 2 years of slowly progressive gait instability.  He has had several falls during this time. He was referred by his PCP to see NSG for arachnoid cyst where he mentioned acute decline in his gait in past 1 week and was sent to ER for further evaluation.  MRI reveals subacute left pontine infarct. Pt also had a right pontine stroke in September 2021. 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 Loaded with plavix with plan for DAPT.     S: Seen in his room this morning. He reports the place was noisy even with the door closed. O/W denies HA, SOB, CP, nausea. Continent. Uses urinal.     Functionally, he is being evaluated.      Medications  Scheduled meds    aspirin  325 mg Oral Daily     atorvastatin  40 mg Oral QPM     clopidogrel  75 mg Oral Daily     enalapril  5 mg Oral Daily     influenza vac high-dose quad  0.7 mL Intramuscular Prior to discharge     insulin aspart  1-7 Units Subcutaneous TID AC     insulin aspart  1-5 Units Subcutaneous At Bedtime     metFORMIN  1,000 mg Oral BID w/meals     pantoprazole  40 mg Oral QAM AC       PRN meds:  acetaminophen, glucose **OR** dextrose **OR** glucagon, polyethylene glycol, senna-docusate      PHYSICAL EXAM  BP (!) 141/70   Pulse 105   Temp 97.9  F (36.6  C) (Oral)   Resp 16   Ht 1.778 m (5' 10\")   Wt 88.3 kg (194 lb 9.6 oz)   SpO2 96%   BMI 27.92 kg/m    Gen: Alert and cooperative.  HEENT: NCAT, MMM  CV: S1, S2 RRR  Pulm: Clear to auscultation  Abd: Soft, non tender  Ext: Calves non tender  Neuro/MSK: Moves all four with " left LE distal weakness, 2/5.    Responds to touch.    LABS  Last Comprehensive Metabolic Panel:  Sodium   Date Value Ref Range Status   10/27/2021 136 133 - 144 mmol/L Final     Potassium   Date Value Ref Range Status   10/27/2021 4.0 3.4 - 5.3 mmol/L Final     Chloride   Date Value Ref Range Status   10/27/2021 104 94 - 109 mmol/L Final     Carbon Dioxide (CO2)   Date Value Ref Range Status   10/27/2021 19 (L) 20 - 32 mmol/L Final     Anion Gap   Date Value Ref Range Status   10/27/2021 13 3 - 14 mmol/L Final     Glucose   Date Value Ref Range Status   10/27/2021 182 (H) 70 - 99 mg/dL Final     GLUCOSE BY METER POCT   Date Value Ref Range Status   10/30/2021 150 (H) 70 - 99 mg/dL Final     Urea Nitrogen   Date Value Ref Range Status   10/27/2021 14 7 - 30 mg/dL Final     Creatinine   Date Value Ref Range Status   10/27/2021 0.86 0.66 - 1.25 mg/dL Final   10/27/2021 0.86 0.66 - 1.25 mg/dL Final     GFR Estimate   Date Value Ref Range Status   10/27/2021 83 >60 mL/min/1.73m2 Final     Comment:     As of July 11, 2021, eGFR is calculated by the CKD-EPI creatinine equation, without race adjustment. eGFR can be influenced by muscle mass, exercise, and diet. The reported eGFR is an estimation only and is only applicable if the renal function is stable.   10/27/2021 83 >60 mL/min/1.73m2 Final     Comment:     As of July 11, 2021, eGFR is calculated by the CKD-EPI creatinine equation, without race adjustment. eGFR can be influenced by muscle mass, exercise, and diet. The reported eGFR is an estimation only and is only applicable if the renal function is stable.  As of July 11, 2021, eGFR is calculated by the CKD-EPI creatinine equation, without race adjustment. eGFR can be influenced by muscle mass, exercise, and diet. The reported eGFR is an estimation only and is only applicable if the renal function is stable.     Calcium   Date Value Ref Range Status   10/27/2021 9.2 8.5 - 10.1 mg/dL Final        CBC RESULTS:   Recent  Labs   Lab Test 10/28/21  0654   WBC 6.8   RBC 4.78   HGB 14.6   HCT 43.1   MCV 90   MCH 30.5   MCHC 33.9   RDW 12.0         Recent Labs   Lab 10/30/21  0157 10/29/21  2107 10/29/21  1720 10/29/21  0757 10/29/21  0226 10/28/21  2256   * 162* 184* 154* 130* 204*     ASSESSMENT AND PLAN    Jose Mallory is a 76 year old right hand dominant male with a past medical history of DM2, HTN, HLD, GERD, posterior fossa arachnoid cyst, peripheral neuropathy, prior R pontine stroke, gait instability, and bilateral knee replacement who was admitted on 10/26/21 with progressive lower extremity weakness and gait difficulties, found to have subacute left pontine stroke with course complicated by neuropathy.  He is now admitted to ARU on 10/29 for multidisciplinary rehabilitation and ongoing medical management.        Admission to acute inpatient rehab 10/29/21.    Impairment group code: Stroke Ischemic 01.2 (R) Body Involvement (L) Brain: L pontine stroke        1. PT and OT 90 minutes of each on a daily basis, in addition to rehab nursing and close management of physiatrist.       2. Impairment of ADL's: Noted to have impaired balance, impaired strength, impaired coordination, impaired activity tolerance, dysmetria, impaired sensation, and truncal ataxia, all affecting his ability to safely and independently perform basic ADLs.  Goal for mod I with basic ADLs.     3. Impairment of mobility:  Noted to have impaired balance, impaired strength, impaired coordination, impaired activity tolerance, dysmetria, impaired sensation, and truncal ataxia, all affecting his ability to safely and independently perform basic mobility.  Goal for mod I with basic mobility.     4. Medical Conditions  #Subacute L pontine stroke  #Subacute to chronic R pontine stroke  #Gait instability  #Arachnoid cyst  #Moderate cervical canal stenosis with disc herniation most pronounced at C3-C4, C4-C5   MRI with subacute cerebral infarction in left  ventral cookie.  TTE EF 60-65%, no significant valvular disease or cardiac source for embolus.  Telemetry with NSR and no evidence of atrial fibrillation.  IV tPA was not initiated due to unclear or unfavorable risk-benefit profile for extended window thrombolysis beyond the conventional 4.5 hour time window.  Etiology of stroke thought to be atherosclerotic.  Gait instability suspected to be multifactorial including peripheral neuropathy, cerebellar disease, and recent bilateral pontine strokes.  - Continue DAPT with Aspirin 325 mg daily and Plavix 75 mg daily x90 days, then stop Plavix and continue Aspirin 325 mg daily only  - High intensity statin (atorvastatin 40 mg daily) to target LDL <70  - HbA1c at goal (6.3%)  - Long-term BP goal to 120/80  - Continue PT/OT  - Outpatient sleep evaluation for SELIN  - Follow up with neurology in 4-6 weeks  - Per hospital discharge summary, if gait has not improved after the pt's stay at acute rehab and/or by the time there is neurology follow up in the outpatient setting, please consider additional neurosurgery consultation.     #Peripheral neuropathy  #Decreased vibration sensation bilaterally in lower extremities  Per neurology, suspect that large fiber sensory dysfunction secondary to diabetes.  Vitamin B12/folate WNL, NEHA negative, HIV non reactive, RPR negative.   - Pending work-up: Homocysteine, MMA, Cu, Zinc and Protein electrophoresis   - Follow up with neurology for EMG     #HTN  - Continue PTA enalapril 5mg PO daily  - Monitor BP, adjust meds as indicated     #DMII  [PTA meds: metformin 1000 mg BID, glipizide 10 mg daily]  A1c 6.3%.  Patient reports compliance with medications, but that he was not taking his blood glucose at home as directed.    - Monitor blood glucose TID AC and HS  - Continue PTA metformin 1g BID  - PTA glipizide 10 mg held throughout inpatient admission, gradually resume during rehab stay if tolerated/indicated  - Hypoglycemia protocol  - Will need  new glucometer and supplies, diabetes educator consulted     5. Adjustment to disability:  Clinical psychology to eval and treat as indicated  6. FEN: regular diet, thin liquids  7. Bowel: continent, monitor for constipation, PRN bowel meds available  8. Bladder: continent, monitor PVRs at admission, ISC at indicated  9. DVT Prophylaxis: mechanical, discontinued Lovenox at admission as walks adequate distances  10. GI Prophylaxis: PPI given DAPT + age  11. Code: full, confirmed on admission  12. Disposition: goal for home  13. ELOS:  7 days  14. Rehab prognosis:  good  15. Follow up Appointments on Discharge: PCP, stroke and general neurology     Doing well. Continue cares and plans outlined.    Fredis Ordonez MD   Physical Medicine & Rehabilitation

## 2021-10-30 NOTE — PLAN OF CARE
FOCUS/GOAL  Bowel management, Bladder management, Pain management, Mobility, Skin integrity, and Safety management    ASSESSMENT, INTERVENTIONS AND CONTINUING PLAN FOR GOAL:  A/O x4, VSS on RA.  Up w/ assist one w/ gait belt and walker.  Continent of bowel and bladder.  Used urinal this shift independently and staff emptied.  PVRS this shift were 136 and 180 encouraged pt to double void.  Last BM 10/28, per pt report.  Regular thin diet, takes his pills well whole.  Bgs were 184 and .  No c/o pain.  No prn meds given.  Skin intact.  Cont w/ POC.

## 2021-10-30 NOTE — PLAN OF CARE
FOCUS/GOAL  Medical management    ASSESSMENT, INTERVENTIONS AND CONTINUING PLAN FOR GOAL:  Patient is pleasantly alert and oriented, slept most of the night. Continent of Bowel and Bladder, used urinal at bedside. Voiding well, PVR 48. A1 for transfers. Patient denies pain, chest pain, no N&V,afebrile, no respiratory distress, V/S taken and recorded. Bed alarm ON and frequent rounding for safety, Patient able to make needs known, used call light appropriately, call light in reach, no acute concern overnight, may continue to monitor.

## 2021-10-30 NOTE — PLAN OF CARE
Postural Assessment Scale for Stroke    Maintaining a posture  1. Sitting without support:3  2. Standing with support: 2  3. Standing without support: 1  4. Standing on nonparetic le  5. Standing on paretic le    Changing posture  6. Supine to affected side lateral: 3  7. Supine to nonaffected side lateral: 3  8. Supine to sitting up on the edge of the table: 3  9. Sitting on the edge of the table to supine: 3  10. Sitting to standing up: 2  11. Standing up to sitting down: 2  12. Standing, picking up a pencil from the floor: 0    Total score:     22/36

## 2021-10-30 NOTE — PLAN OF CARE
Discharge Planner Post-Acute Rehab OT:     Discharge Plan: home with wife    Precautions: falls    Current Status:  ADLs: G/H:  U/b dressing: button down shirt sba mod effort for buttons  L/B dressing: shorts sba leans to r whilewsitting eob with tasks able to do from chair  Feet: sba  sitting in chair crossing leg over other  IADLs: tba  Vision/Cognition: slums 20/30    Assessment: decrease core balance sitting eob increase function with adls sittig in chair, cog slums 20/30 pt willbenifit from ot per ot goals    Other Barriers to Discharge (DME, Family Training, etc): daughter in law PT pt has rolling walker and higher toilets at home

## 2021-10-30 NOTE — PLAN OF CARE
Alert and oriented x4. Continent of bowel and bladder. LBM 10/28. Denies pain. Assist of 1 with a walker. Regular thin diet. QID blood glucose checks with sliding scale insulin. Skin is unremarkable. No acute medical concerns; patient's questions answered. Bed alarm on for safety, call light within reach.

## 2021-10-30 NOTE — PLAN OF CARE
"Discharge Planner Post-Acute Rehab PT:     Discharge Plan: Home with OP PT with discharge date anticipated 11/8    Precautions: Fall precaution    Current Status:  Bed Mobility: SBA, but inconsistent with fatigue levels  Transfer: CGA with fww to complete stand pivot transfer  Gait: AMb >150 with fww and CGA.  Increased time needed due to inconsistent gait pattern due to LLE inconsistent activation and pt attention  Stairs: 8 6\" steps with B railings and reciprocal pattern needing increased UE support for L step;  limited due to dizziness  Balance: Fair with R lateral lean especially in sitting;  Haas to be completed tomorrow 10/31  PASS:  22/36 at [facility] 10/30    Assessment:  Pt presents to ARU following ongoing mobility deficits with history of stenosis and CVA.  Pt has noted mild-mod L side deficits in strength and coordination.  Poor posture noted with R sided lean in sitting and weight-shift onto RLE in standing.  Coordination deficits limit gait with attention difficulties also.  Gait significantly effected by starting, turning and open environment.      Other Barriers to Discharge (DME, Family Training, etc): None  "

## 2021-10-30 NOTE — PROGRESS NOTES
10/30/21 0900   Quick Adds   Type of Visit Initial PT Evaluation   Living Environment   People in home spouse   Current Living Arrangements house   Home Accessibility stairs to enter home;stairs within home   Number of Stairs, Main Entrance 3   Stair Railings, Main Entrance railings on both sides of stairs   Number of Stairs, Within Home, Primary greater than 10 stairs   Stair Railings, Within Home, Primary railing on left side (ascending)   Transportation Anticipated family or friend will provide   Living Environment Comments PT:  All needs met on main level following 3 RAJAT.  Spouse home 24/7 as needed.  Daughter-in-law is a PT and lives close.   Self-Care   Usual Activity Tolerance moderate   Current Activity Tolerance fair   Regular Exercise No   Equipment Currently Used at Home cane, quad;walker, rolling   Activity/Exercise/Self-Care Comment PT:  Pt no formal excercises.  Function has been decreasing for 1 year prior to admission.   Disability/Function   Hearing Difficulty or Deaf no   Wear Glasses or Blind no   Fall history within last six months yes   Number of times patient has fallen within last six months 4   Change in Functional Status Since Onset of Current Illness/Injury yes   General Information   Onset of Illness/Injury or Date of Surgery 10/26/21   Referring Physician Bozena Bower PA-C   Patient/Family Therapy Goals Statement (PT) PT:  Better walking and better balance.   Pertinent History of Current Problem (include personal factors and/or comorbidities that impact the POC) PT:  Pt with a history of DMII, HLD, HTN, cerebellar arachnoid cyst, chronic lacunes, peripheral neuropathy, bilateral knee replacement, who has had approximately 2 years of slowly progressive gait instability.  He has had several falls during this time. He was referred by his PCP to see NSG for arachnoid cyst where he mentioned acute decline in his gait in past 1 week and was sent to ER for further evaluation.  MRI  reveals subacute left pontine infarct. Pt also had a right pontine stroke in September 2021. 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 Loaded with plavix with plan for DAPT.   Cognition   Orientation Status (Cognition) oriented x 4   Cognitive Status Comments PT:  Pt feels cognitivly at baseline.  Pt noticed mild-mod attention and verbal impulsivity during sessions, but unable to identify behavioral vs symptom.    Pain Assessment   Patient Currently in Pain No   Integumentary/Edema   Integumentary/Edema no deficits were identifed   Posture    Posture Comments PT:  Pt sits with R lateral lean and stands with RLE weight-shift.  Mild fwd flexed posture also noted.   Range of Motion (ROM)   ROM Quick Adds ROM WNL   Strength   Strength Comments PT:  Grossly 5/5 strength on R with 4/5 for hip flexion;  4+/5 on L with 3/5 on L hip flexion.   ARC Assessment Only   Acute Rehab Functional Assessment See IP Rehab Daily Documentation Flowsheet for Functional Mobility/ADL Assessment   Balance   Balance Comments PT:  Good sitting balance with SBA needed for occational R lateral lean.  CGA need for standing balance due to RLE weight-shift and R fall risk.   Sensory Examination   Sensory Perception WNL   Coordination   Coordination Comments PT:  Min-mod regression in the past 6 months, mild deficit on L verse R.  Increased coordination defciits noted with fatigue.   Muscle Tone   Muscle Tone no deficits were identified   Clinical Impression   Criteria for Skilled Therapeutic Intervention yes, treatment indicated   PT Diagnosis (PT) Abnormalities of Gait and Mobility   Influenced by the following impairments L sided muscle weakness and coordination deficits, postural deficits, balance deficits, mild attention/cognition deficits.   Functional limitations due to impairments Bed mobility, transfers, gait and stairs   Clinical  Presentation Evolving/Changing   Clinical Presentation Rationale Due to nature of injury pt will continue to change as pt heals.    Clinical Decision Making (Complexity) moderate complexity   Therapy Frequency (PT) Daily   Predicted Duration of Therapy Intervention (days/wks) 10 days   Planned Therapy Interventions (PT) bed mobility training;balance training;cryotherapy;gait training;home exercise program;lumbar stabilization;motor coordination training;manual therapy techniques;neuromuscular re-education;patient/family education;postural re-education;ROM (range of motion);stair training;strengthening;stretching;swiss ball techniques;TENS;thermotherapy;transfer training;risk factor education;progressive activity/exercise;home program guidelines   Risk & Benefits of therapy have been explained evaluation/treatment results reviewed;care plan/treatment goals reviewed;current/potential barriers reviewed;risks/benefits reviewed;participants included;participants voiced agreement with care plan;patient   Clinical Impression Comments PT presents to [facility] following CVA and is appropriate for skilled PT intervention due to limitations in L sided strength and coordination, poor balance and poor posture making discharge to home unsafe at this time.   PT Discharge Planning    PT Discharge Recommendation (DC Rec) home with outpatient physical therapy   PT Rationale for DC Rec PT:  PT will need ongoing PT intervention following discharge to progress to PLF   PT Brief overview of current status  PT:  Pt is CGA for transfers and gait limited by L sided weakness and coordination deficits.   Total Evaluation Time   Total Evaluation Time (Minutes) 25

## 2021-10-30 NOTE — PHARMACY-MEDICATION REGIMEN REVIEW
Pharmacy Medication Regimen Review  Jose Mallory is a 76 year old male who is currently in the Acute Rehab Unit.    Assessment: All medications have an appropriate indications, durations and no unnecessary use was found    Plan:   Continue medications as ordered. Possibly resume glipizide XL 10mg when able.  Attending provider will be sent this note for review.  If there are any emergent issues noted above, pharmacist will contact provider directly by phone.      Pharmacy will periodically review the resident's medication regimen for any PRN medications not administered in > 72 hours and discontinue them. The pharmacist will discuss gradual dose reductions of psychopharmacologic medications with interdisciplinary team on a regular basis.    Please contact pharmacy if the above does not answer specific medication questions/concerns.    Background:  A pharmacist has reviewed all medications and pertinent medical history today.  Medications were reviewed for appropriate use and any irregularities found are listed with recommendations.      Current Facility-Administered Medications:      acetaminophen (TYLENOL) tablet 325-650 mg, 325-650 mg, Oral, Q6H PRN, Bozena Bower PA-C     aspirin (ASA) tablet 325 mg, 325 mg, Oral, Daily, Bozena Bower PA-C, 325 mg at 10/30/21 0717     atorvastatin (LIPITOR) tablet 40 mg, 40 mg, Oral, QPM, Bozena Bower PA-C, 40 mg at 10/29/21 2043     clopidogrel (PLAVIX) tablet 75 mg, 75 mg, Oral, Daily, Bozena Bower PA-C, 75 mg at 10/30/21 0717     glucose gel 15-30 g, 15-30 g, Oral, Q15 Min PRN **OR** dextrose 50 % injection 25-50 mL, 25-50 mL, Intravenous, Q15 Min PRN **OR** glucagon injection 1 mg, 1 mg, Subcutaneous, Q15 Min PRN, Bozena Bower PA-C     enalapril (VASOTEC) tablet 5 mg, 5 mg, Oral, Daily, Bozena Bower PA-C, 5 mg at 10/30/21 0717     influenza vac high-dose quad (FLUZONE HD) injection MARIANN 0.7 mL, 0.7 mL, Intramuscular, Prior to  discharge, Fredis Ordonez MD     insulin aspart (NovoLOG) injection (RAPID ACTING), 1-7 Units, Subcutaneous, TID AC, Bozena Bower PA-C, 1 Units at 10/29/21 1757     insulin aspart (NovoLOG) injection (RAPID ACTING), 1-5 Units, Subcutaneous, At Bedtime, Bozena Bower PA-C     metFORMIN (GLUCOPHAGE) tablet 1,000 mg, 1,000 mg, Oral, BID w/meals, Bozena Bower PA-C, 1,000 mg at 10/30/21 0717     pantoprazole (PROTONIX) EC tablet 40 mg, 40 mg, Oral, QAM AC, Bozena Bower PA-C, 40 mg at 10/30/21 0606     polyethylene glycol (MIRALAX) Packet 17 g, 17 g, Oral, Daily PRN, Bozena Bower PA-C     senna-docusate (SENOKOT-S/PERICOLACE) 8.6-50 MG per tablet 1-2 tablet, 1-2 tablet, Oral, BID PRN, Bozena Bower PA-C  No current outpatient prescriptions on file.

## 2021-10-30 NOTE — PROGRESS NOTES
"SPIRITUAL HEALTH SERVICES  SPIRITUAL ASSESSMENT Progress Note  [facility] ([facility]) Acute Rehab    REFERRAL SOURCE: admission request    PRIMARY FOCUS:     Introduction to [facility]    Establish trust and rapport    Emotional/spiritual/Christian support    On-call visit with Jose in between his OT/PT appointments today.  We were able to share conversation & prayer and his is welcoming of future spiritual care visits.    ILLNESS CIRCUMSTANCES:     Context of Serious Illness/Symptom(s) - Jose shared that he knew \"something was amiss\" and he & his family decided to come to Neurology at the , because they've \"heard good things about this hospital over the years.\"  He then went to the ED where an MRI revealed that he's had \"a number of small strokes.\"     oJse shared that he wasn't able to get into an immediate placement at an Acute Rehab facility closer to home, so \"here he is.\"    Resources for Support - Wife of 55 years, adult sons, Evangelical community    EMOTIONAL AND SPIRITUAL COPING:     Yarsanism/Zainab - Pres[employer];  Family has been members of a small country Evangelical for 4 generations    Spiritual Practice(s) - prayer; Evangelical attendance    Emotional/Relational/Existential Connections - Jose spoke about his wife of 55 years, his sons and grandchildren, and his life's work in farming.    SENSE-MAKING:    Goals of Care - Jose is hopeful that he will be able to get stronger, though he acknowledges he may never recover to how strong/healthy he used to be.  He named \"if I get better that's great, if I don't that's okay too\" and he named his zainab and not having a fear of death.    PLAN: Will refer to unit  for ongoing spiritual support.    Pily Baltazar M.Div.  Staff   Pager [phone]  * [facility] remains available 24/7 for emergent requests/referrals, either by having the switchboard page the on-call  or by entering an ASAP/STAT consult in Epic (this will also page the on-call ).*     "

## 2021-10-30 NOTE — PROGRESS NOTES
10/30/21 0829   Quick Adds   Type of Visit Initial Occupational Therapy Evaluation   Living Environment   People in home spouse   Home Accessibility stairs to enter home   Living Environment Comments 2 steps to enter onto deck with b rails, then into home 2 story home pt lives on main level,17 toilet sink counter on r, walk in shower  Stood for shower other bathroom tub/shower with handheld shower. Bedroom flat bed. 3 sons with one son living across the road, retired farmer, used larger riding , driving  Wife does most cooking all cleaning and laundry and grocery shopping.pt at home was I with all adls until a few days prior to hospitalization getting assist with shower dressing and transfers, daughter in law is PT and grandaughter in PT program   Self-Care   Usual Activity Tolerance moderate   Current Activity Tolerance fair   Equipment Currently Used at Home   (cane, quad;glucometer and 4 ww)   Activity/Exercise/Self-Care Comment 2 steps to enter onto deck with b rails, then into home 2 story home pt lives on main level,17 toilet sink counter on r, walk in shower  Stood for shower other bathroom tub/shower with handheld shower. Bedroom flat bed. 3 sons with one son living across the road, retired farmer, used larger riding , driving  Wife does most cooking all cleaning and laundry and grocery shopping.pt at home was I with all adls until a few days prior to hospitalization getting assist with shower dressing and transfers   Disability/Function   Hearing Difficulty or Deaf no   Wear Glasses or Blind no   Concentrating, Remembering or Making Decisions Difficulty no   Difficulty Communicating no   Difficulty Eating/Swallowing no   Walking or Climbing Stairs Difficulty no   Dressing/Bathing Difficulty no   Toileting issues no   Doing Errands Independently Difficulty (such as shopping) no   Fall history within last six months yes   Number of times patient has fallen within last six months 4    Change in Functional Status Since Onset of Current Illness/Injury yes   General Information   Referring Physician Fredis Ordonez MD   Patient/Family Therapy Goal Statement (OT) to return home   Left Upper Extremity (Weight-bearing Status) full weight-bearing (FWB)   Right Upper Extremity (Weight-bearing Status) full weight-bearing (FWB)   Left Lower Extremity (Weight-bearing Status) full weight-bearing (FWB)   Right Lower Extremity (Weight-bearing Status) full weight-bearing (FWB)   Heart Disease Risk Factors Medical history   Cognitive Status Examination   Orientation Status orientation to person, place and time   Cognitive Status Comments slums 20/30   Visual Perception   Visual Impairment/Limitations WNL   Sensory   Sensory Quick Adds No deficits were identified   Pain Assessment   Patient Currently in Pain No   Range of Motion Comprehensive   General Range of Motion no range of motion deficits identified   Hand Strength   Right hand  (pounds) r 53   Left hand  (pounds) l 43    Coordination   Left hand, nine hole peg test (seconds) 32.5   Right hand, nine hole peg test (seconds) 24.7   ARC Assessment Only   Acute Rehab Functional Assessment See IP Rehab Daily Documentation Flowsheet for Functional Mobility/ADL Assessment   Clinical Impression   Criteria for Skilled Therapeutic Interventions Met (OT) yes   OT Diagnosis cva with decrease adls and iadls prior to cva   OT Problem List-Impairments impacting ADL problems related to;activity tolerance impaired;balance;cognition;coordination;mobility;motor control;strength   ADL comments/analysis decreasae adls and iadls pt willbenifit from ot goals to returnhome with wife   Assessment of Occupational Performance 3-5 Performance Deficits   Identified Performance Deficits bathing dressing toileting meal prep homemanagement   Planned Therapy Interventions (OT) ADL retraining;IADL retraining;balance training;bed mobility training;cognition;fine motor  coordination training;motor coordination training;strengthening;transfer training;home program guidelines;progressive activity/exercise;risk factor education   Intervention Comments decreasae adls and iadls pt willbenifit from ot goals to returnhome with wife   Clinical Decision Making Complexity (OT) moderate complexity   Therapy Frequency (OT) Daily   Predicted Duration of Therapy 10 days   Total Evaluation Time (Minutes)   Total Evaluation Time (Minutes) 15

## 2021-10-31 ENCOUNTER — APPOINTMENT (OUTPATIENT)
Dept: OCCUPATIONAL THERAPY | Facility: CLINIC | Age: 76
DRG: 056 | End: 2021-10-31
Attending: PHYSICAL MEDICINE & REHABILITATION
Payer: MEDICARE

## 2021-10-31 ENCOUNTER — APPOINTMENT (OUTPATIENT)
Dept: PHYSICAL THERAPY | Facility: CLINIC | Age: 76
DRG: 056 | End: 2021-10-31
Attending: PHYSICAL MEDICINE & REHABILITATION
Payer: MEDICARE

## 2021-10-31 LAB
GLUCOSE BLDC GLUCOMTR-MCNC: 138 MG/DL (ref 70–99)
GLUCOSE BLDC GLUCOMTR-MCNC: 140 MG/DL (ref 70–99)
GLUCOSE BLDC GLUCOMTR-MCNC: 144 MG/DL (ref 70–99)
GLUCOSE BLDC GLUCOMTR-MCNC: 168 MG/DL (ref 70–99)
GLUCOSE BLDC GLUCOMTR-MCNC: 172 MG/DL (ref 70–99)
PYRIDOXAL PHOS SERPL-SCNC: 25.4 NMOL/L

## 2021-10-31 PROCEDURE — 97116 GAIT TRAINING THERAPY: CPT | Mod: GP | Performed by: PHYSICAL THERAPIST

## 2021-10-31 PROCEDURE — 97530 THERAPEUTIC ACTIVITIES: CPT | Mod: GP | Performed by: PHYSICAL THERAPIST

## 2021-10-31 PROCEDURE — 97530 THERAPEUTIC ACTIVITIES: CPT | Mod: GO

## 2021-10-31 PROCEDURE — 97750 PHYSICAL PERFORMANCE TEST: CPT | Mod: GP | Performed by: PHYSICAL THERAPIST

## 2021-10-31 PROCEDURE — 128N000003 HC R&B REHAB

## 2021-10-31 PROCEDURE — 99233 SBSQ HOSP IP/OBS HIGH 50: CPT | Performed by: PHYSICAL MEDICINE & REHABILITATION

## 2021-10-31 PROCEDURE — 97112 NEUROMUSCULAR REEDUCATION: CPT | Mod: GP | Performed by: PHYSICAL THERAPIST

## 2021-10-31 PROCEDURE — 97110 THERAPEUTIC EXERCISES: CPT | Mod: GP | Performed by: PHYSICAL THERAPIST

## 2021-10-31 PROCEDURE — 97535 SELF CARE MNGMENT TRAINING: CPT | Mod: GO

## 2021-10-31 PROCEDURE — 250N000013 HC RX MED GY IP 250 OP 250 PS 637: Performed by: PHYSICIAN ASSISTANT

## 2021-10-31 RX ADMIN — METFORMIN HYDROCHLORIDE 1000 MG: 500 TABLET ORAL at 17:51

## 2021-10-31 RX ADMIN — INSULIN ASPART 1 UNITS: 100 INJECTION, SOLUTION INTRAVENOUS; SUBCUTANEOUS at 13:02

## 2021-10-31 RX ADMIN — CLOPIDOGREL BISULFATE 75 MG: 75 TABLET, FILM COATED ORAL at 09:42

## 2021-10-31 RX ADMIN — METFORMIN HYDROCHLORIDE 1000 MG: 500 TABLET ORAL at 09:42

## 2021-10-31 RX ADMIN — ENALAPRIL MALEATE 5 MG: 5 TABLET ORAL at 09:42

## 2021-10-31 RX ADMIN — ASPIRIN 325 MG ORAL TABLET 325 MG: 325 PILL ORAL at 09:42

## 2021-10-31 RX ADMIN — ATORVASTATIN CALCIUM 40 MG: 40 TABLET, FILM COATED ORAL at 21:22

## 2021-10-31 RX ADMIN — INSULIN ASPART 1 UNITS: 100 INJECTION, SOLUTION INTRAVENOUS; SUBCUTANEOUS at 17:52

## 2021-10-31 RX ADMIN — PANTOPRAZOLE SODIUM 40 MG: 40 TABLET, DELAYED RELEASE ORAL at 05:20

## 2021-10-31 ASSESSMENT — ACTIVITIES OF DAILY LIVING (ADL)
ADLS_ACUITY_SCORE: 14
ADLS_ACUITY_SCORE: 8
ADLS_ACUITY_SCORE: 14
ADLS_ACUITY_SCORE: 8
ADLS_ACUITY_SCORE: 14
ADLS_ACUITY_SCORE: 8
ADLS_ACUITY_SCORE: 8
ADLS_ACUITY_SCORE: 14
ADLS_ACUITY_SCORE: 8
ADLS_ACUITY_SCORE: 12
ADLS_ACUITY_SCORE: 8
ADLS_ACUITY_SCORE: 12
ADLS_ACUITY_SCORE: 12
ADLS_ACUITY_SCORE: 8
ADLS_ACUITY_SCORE: 14
ADLS_ACUITY_SCORE: 12
ADLS_ACUITY_SCORE: 8
ADLS_ACUITY_SCORE: 14
ADLS_ACUITY_SCORE: 12
ADLS_ACUITY_SCORE: 8
ADLS_ACUITY_SCORE: 8
ADLS_ACUITY_SCORE: 12
ADLS_ACUITY_SCORE: 12
ADLS_ACUITY_SCORE: 14

## 2021-10-31 NOTE — PROGRESS NOTES
"  [facility]   Acute Rehabilitation Unit  Daily progress note  CC:     Stroke Ischemic 01.2 (R) Body Involvement (L) Brain: L pontine stroke  76 year old man with a history of DMII, HLD, HTN, cerebellar arachnoid cyst, chronic lacunes, peripheral neuropathy, bilateral knee replacement, who has had approximately 2 years of slowly progressive gait instability.  He has had several falls during this time. He was referred by his PCP to see NSG for arachnoid cyst where he mentioned acute decline in his gait in past 1 week and was sent to ER for further evaluation.  MRI reveals subacute left pontine infarct. Pt also had a right pontine stroke in September 2021. 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 Loaded with plavix with plan for DAPT.     S: Seen in his room this morning. He denies HA, SOB, CP, nausea. Continent. Uses urinal. Ate breakfast laste and had poor intake for lunch and dinner. Seeing PT.    Functionally, he is being evaluated.  Bed Mobility: SBA, but inconsistent with fatigue levels  Transfer: CGA with fww to complete stand pivot transfer  Gait: AMb >150 with fww and CGA.  Increased time needed due to inconsistent gait pattern due to LLE inconsistent activation and pt attention  Stairs: 8 6\" steps with B railings and reciprocal pattern needing increased UE support for L step;  limited due to dizziness  Balance: Fair with R lateral lean especially in sitting;  Haas to be completed  10/31  PASS:  22/36 at Eval 10/30      Medications  Scheduled meds    aspirin  325 mg Oral Daily     atorvastatin  40 mg Oral QPM     clopidogrel  75 mg Oral Daily     enalapril  5 mg Oral Daily     influenza vac high-dose quad  0.7 mL Intramuscular Prior to discharge     insulin aspart  1-7 Units Subcutaneous TID AC     insulin aspart  1-5 Units Subcutaneous At Bedtime     metFORMIN  1,000 " "mg Oral BID w/meals     pantoprazole  40 mg Oral QAM AC       PRN meds:  acetaminophen, glucose **OR** dextrose **OR** glucagon, melatonin, polyethylene glycol, senna-docusate      PHYSICAL EXAM  BP (!) 141/79 (BP Location: Left arm)   Pulse 91   Temp 97.8  F (36.6  C) (Oral)   Resp 18   Ht 1.778 m (5' 10\")   Wt 88.3 kg (194 lb 9.6 oz)   SpO2 94%   BMI 27.92 kg/m    Gen: Alert and cooperative.  HEENT: NCAT, MMM  CV: S1, S2 RRR  Pulm: Clear to auscultation  Abd: Soft, non tender  Ext: Calves non tender  Neuro/MSK: Moves all four with left LE distal weakness, 2/5.    Responds to touch.    LABS  Last Comprehensive Metabolic Panel:  Sodium   Date Value Ref Range Status   10/27/2021 136 133 - 144 mmol/L Final     Potassium   Date Value Ref Range Status   10/27/2021 4.0 3.4 - 5.3 mmol/L Final     Chloride   Date Value Ref Range Status   10/27/2021 104 94 - 109 mmol/L Final     Carbon Dioxide (CO2)   Date Value Ref Range Status   10/27/2021 19 (L) 20 - 32 mmol/L Final     Anion Gap   Date Value Ref Range Status   10/27/2021 13 3 - 14 mmol/L Final     Glucose   Date Value Ref Range Status   10/27/2021 182 (H) 70 - 99 mg/dL Final     GLUCOSE BY METER POCT   Date Value Ref Range Status   10/31/2021 172 (H) 70 - 99 mg/dL Final     Urea Nitrogen   Date Value Ref Range Status   10/27/2021 14 7 - 30 mg/dL Final     Creatinine   Date Value Ref Range Status   10/27/2021 0.86 0.66 - 1.25 mg/dL Final   10/27/2021 0.86 0.66 - 1.25 mg/dL Final     GFR Estimate   Date Value Ref Range Status   10/27/2021 83 >60 mL/min/1.73m2 Final     Comment:     As of July 11, 2021, eGFR is calculated by the CKD-EPI creatinine equation, without race adjustment. eGFR can be influenced by muscle mass, exercise, and diet. The reported eGFR is an estimation only and is only applicable if the renal function is stable.   10/27/2021 83 >60 mL/min/1.73m2 Final     Comment:     As of July 11, 2021, eGFR is calculated by the CKD-EPI creatinine equation, " without race adjustment. eGFR can be influenced by muscle mass, exercise, and diet. The reported eGFR is an estimation only and is only applicable if the renal function is stable.  As of July 11, 2021, eGFR is calculated by the CKD-EPI creatinine equation, without race adjustment. eGFR can be influenced by muscle mass, exercise, and diet. The reported eGFR is an estimation only and is only applicable if the renal function is stable.     Calcium   Date Value Ref Range Status   10/27/2021 9.2 8.5 - 10.1 mg/dL Final        CBC RESULTS:   Recent Labs   Lab Test 10/28/21  0654   WBC 6.8   RBC 4.78   HGB 14.6   HCT 43.1   MCV 90   MCH 30.5   MCHC 33.9   RDW 12.0         Recent Labs   Lab 10/31/21  0922 10/31/21  0201 10/30/21  2138 10/30/21  1204 10/30/21  0157 10/29/21  2107   * 138* 124* 134* 150* 162*     ASSESSMENT AND PLAN    Jose Mallory is a 76 year old right hand dominant male with a past medical history of DM2, HTN, HLD, GERD, posterior fossa arachnoid cyst, peripheral neuropathy, prior R pontine stroke, gait instability, and bilateral knee replacement who was admitted on 10/26/21 with progressive lower extremity weakness and gait difficulties, found to have subacute left pontine stroke with course complicated by neuropathy.  He is now admitted to ARU on 10/29 for multidisciplinary rehabilitation and ongoing medical management.        Admission to acute inpatient rehab 10/29/21.    Impairment group code: Stroke Ischemic 01.2 (R) Body Involvement (L) Brain: L pontine stroke        1. PT and OT 90 minutes of each on a daily basis, in addition to rehab nursing and close management of physiatrist.       2. Impairment of ADL's: Noted to have impaired balance, impaired strength, impaired coordination, impaired activity tolerance, dysmetria, impaired sensation, and truncal ataxia, all affecting his ability to safely and independently perform basic ADLs.  Goal for mod I with basic ADLs.     3. Impairment  of mobility:  Noted to have impaired balance, impaired strength, impaired coordination, impaired activity tolerance, dysmetria, impaired sensation, and truncal ataxia, all affecting his ability to safely and independently perform basic mobility.  Goal for mod I with basic mobility.     4. Medical Conditions  #Subacute L pontine stroke  #Subacute to chronic R pontine stroke  #Gait instability  #Arachnoid cyst  #Moderate cervical canal stenosis with disc herniation most pronounced at C3-C4, C4-C5   MRI with subacute cerebral infarction in left ventral cookie.  TTE EF 60-65%, no significant valvular disease or cardiac source for embolus.  Telemetry with NSR and no evidence of atrial fibrillation.  IV tPA was not initiated due to unclear or unfavorable risk-benefit profile for extended window thrombolysis beyond the conventional 4.5 hour time window.  Etiology of stroke thought to be atherosclerotic.  Gait instability suspected to be multifactorial including peripheral neuropathy, cerebellar disease, and recent bilateral pontine strokes.  - Continue DAPT with Aspirin 325 mg daily and Plavix 75 mg daily x90 days, then stop Plavix and continue Aspirin 325 mg daily only  - High intensity statin (atorvastatin 40 mg daily) to target LDL <70  - HbA1c at goal (6.3%)  - Long-term BP goal to 120/80  - Continue PT/OT  - Outpatient sleep evaluation for SELIN  - Follow up with neurology in 4-6 weeks  - Per hospital discharge summary, if gait has not improved after the pt's stay at acute rehab and/or by the time there is neurology follow up in the outpatient setting, please consider additional neurosurgery consultation.     #Peripheral neuropathy  #Decreased vibration sensation bilaterally in lower extremities  Per neurology, suspect that large fiber sensory dysfunction secondary to diabetes.  Vitamin B12/folate WNL, NEHA negative, HIV non reactive, RPR negative.   - Pending work-up: Homocysteine, MMA, Cu, Zinc and Protein  electrophoresis   - Follow up with neurology for EMG     #HTN  - Continue PTA enalapril 5mg PO daily  - Monitor BP, adjust meds as indicated     #DMII  [PTA meds: metformin 1000 mg BID, glipizide 10 mg daily]  A1c 6.3%.  Patient reports compliance with medications, but that he was not taking his blood glucose at home as directed.    - Monitor blood glucose TID AC and HS  - Continue PTA metformin 1g BID  - PTA glipizide 10 mg held throughout inpatient admission, gradually resume during rehab stay if tolerated/indicated  - Hypoglycemia protocol  - Will need new glucometer and supplies, diabetes educator consulted     5. Adjustment to disability:  Clinical psychology to eval and treat as indicated  6. FEN: regular diet, thin liquids  7. Bowel: continent, monitor for constipation, PRN bowel meds available  8. Bladder: continent, monitor PVRs at admission, ISC at indicated  9. DVT Prophylaxis: mechanical, discontinued Lovenox at admission as walks adequate distances  10. GI Prophylaxis: PPI given DAPT + age  11. Code: full, confirmed on admission  12. Disposition: goal for home  13. ELOS:  7 days  14. Rehab prognosis:  good  15. Follow up Appointments on Discharge: PCP, stroke and general neurology     Doing well. Continue cares and plans outlined.    Fredis Ordonez MD   Physical Medicine & Rehabilitation

## 2021-10-31 NOTE — CONSULTS
Social Work: Initial Assessment with Discharge Plan    Patient Name: Jose Mallory  : 1945  Age: 76 year old  MRN: [id number]  Completed assessment with: Chart review and interview with patient   Admitted to ARU: 10/29/21    Presenting Information   Date of SW assessment: 2021  Health Care Directive: Declined completing  Primary Health Care Agent: Patient/self  Secondary Health Care Agent: Spouse, Dianna  Living Situation: Pt lives with spouse in [place], [place]; house with 3 steps to enter onto deck with bilateral rails, 2-story home (railings on L. Side of stairs inside home). Pt lives on main level, all basic needs met on main level.  Previous Functional Status: Wife does most cooking and all cleaning, laundry, and grocery shopping. Pt was independent at home with all adls until a few days prior to hospitalization   DME available: hand-held shower head, quad cane, 4WW, glucometer.  Patient and family understanding of hospitalization: Appropriate and pleasant  Cultural/Language/Spiritual Considerations: Pt is a 76 y.o. English-speaking male, , retired. Identifies as [employer].      Physical Health  Reason for admission: Patient is a 76 year old right hand dominant male with a past medical history of DM2, HTN, HLD, GERD, posterior fossa arachnoid cyst, peripheral neuropathy, prior R pontine stroke, gait instability, and bilateral knee replacement who was admitted on 10/26/21 with progressive lower extremity weakness and gait difficulties, found to have subacute left pontine stroke with course complicated by neuropathy.    Provider Information   Primary Care Physician:Pedro Srivastava   [place] [address]  PH:  [phone]  : None reported    Mental Health/Chemical Dependency:   Diagnosis: None reported  Alcohol/Tobacco/Narcotis: Denies alcohol/tobacco/illicit drug use.  Support/Services in Place: None reported  Services  Needed/Recommended: Supportive services available by consult (Health Psychology and [place] services)   Sexuality/Intimacy: Not discussed     Support System  Marital Status: Spouse, Dianna, home 24/7. Spouse was diagnosed with pleurisy on 10/31/21; currently not feeling well.  Family support: 3 adult sons, with one son ([place]) living across the road with daughter-in-law, Yaz, who is a Physical Therapist (granddaughter is in PT program).  Other support available: Other sons live in [place] (15 miles from pt) and [place] (50 miles from pt). Pt reports having good support from friends, closest one being neighbor, Andree.    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   Provided Education on discharge plan: Yes. Primary team to discuss with pt once final recommendations have been determined.  Patient agreeable to discharge plan:  Yes; pt notified that primary SW will follow-up.  Provided education and attained signature for Medicare IM and IRF Patient Rights and Privacy Information provided to patient : Yes  Provided patient with [place] [facility] Resources: Yes; pt notified that primary SW will follow-up with additional resources, as pt is a [place] resident.  Barriers to discharge: None indicated.    Discharge Recommendations   Disposition: See above   Transportation Needs: Son(s) will be providing transportation.  Name of Transportation Company and Phone: N/A     Additional comments   Discharge TBLUIS JI 10 days    SW will remain available and continue to follow as needs arise.     Please invite to Care Conference:  To  be discussed with primary SW.      BARBARA Goodwin  Weekend   Acute Rehab Unit, Social Work PH: [phone]  Transitional Care Unit, Social Work PH: [phone]

## 2021-10-31 NOTE — PLAN OF CARE
FOCUS/GOAL  Medical management    ASSESSMENT, INTERVENTIONS AND CONTINUING PLAN FOR GOAL:  Patient alert and oriented, admitted here Post Stroke with Increase Weakness and unsteady gait. A1 with walker, Continent of Bladder, used urinal at bedside, no BM overnight. Patient denies pain, chest pain, breathing normal,slept comfortably overnight, able to communicate needs, call light within reach, no acute concern at this time, may continue to monitor.

## 2021-10-31 NOTE — PLAN OF CARE
"Patient alert and oriented x4. Continent of urine and bowel, uses urinal at bedside. Last BM 10/28. Denies pain. Patient assist of 1x with walker and transfer belt. Regular thin diet. QID blood glucose checks with sliding scale insulin. Skin is intact. Patient had emesis x1 this shift after therapy. Bowel sounds assessed and emesis bag given. Bowel sounds present; active on left side, slower but present on right side. Patient stated. \"it came on fast, but now I feel better.\" Call light in reach, bed alarm on for safety.   "

## 2021-10-31 NOTE — PLAN OF CARE
"Discharge Planner Post-Acute Rehab PT:     Discharge Plan: Home with OP PT with discharge date anticipated 11/8    Precautions: Fall precaution    Current Status:  Bed Mobility: SBA, but inconsistent with fatigue levels  Transfer: CGA with fww to complete stand pivot transfer  Gait: AMb >150 with fww and CGA.  Increased time needed due to inconsistent gait pattern due to LLE inconsistent activation and pt attention  Stairs: 8 6\" steps with B railings and reciprocal pattern needing increased UE support for L step;  limited due to dizziness  Balance: Fair with R lateral lean especially in sitting   PASS:  22/36 at Eval 10/30  Haas 18/56  Gait Speed: .19 m/s    Assessment: Pt noted fatigue today with emesis at end of PM PT session.  Pt had no noted symptoms prior to lying supine at end of session.  Pt had no noted symptoms post emesis.  Pt completed Haas scoring 18/56 and showed depressive signs due to realizing balance deficits.  Gait and transfers are inconsistent and have parkinsonian traits especially gait.     Other Barriers to Discharge (DME, Family Training, etc): None  "

## 2021-10-31 NOTE — PLAN OF CARE
Discharge Planner Post-Acute Rehab OT:     Discharge Plan: home with wife    Precautions: falls    Current Status:  ADLs: G/H:  U/b dressing: button down shirt sba mod effort for buttons  L/B dressing: shorts sba leans to r while wsitting eob with tasks able to do from chair  Feet: sba  sitting in chair crossing leg over other  Shower: ot sba min cues for sequencing slower processing of shower task pt able to wash all dougie  inclusing crossing leg over other to gt lowerr leg and feet and standing with cga  with rail for pericares from front and back  IADLs: tba  Vision/Cognition: slums 20/30    Assessment: increase sitting balance with dynamic unit(s)/b task sitting on eob sba for shower and full body dressing slow processing noted with shower requiring min cues for sequencing pt will continues to benifit from ot per ot goals    Other Barriers to Discharge (DME, Family Training, etc): daughter in law PT pt has rolling walker and higher toilets at home

## 2021-11-01 ENCOUNTER — APPOINTMENT (OUTPATIENT)
Dept: PHYSICAL THERAPY | Facility: CLINIC | Age: 76
DRG: 056 | End: 2021-11-01
Attending: PHYSICAL MEDICINE & REHABILITATION
Payer: MEDICARE

## 2021-11-01 ENCOUNTER — APPOINTMENT (OUTPATIENT)
Dept: OCCUPATIONAL THERAPY | Facility: CLINIC | Age: 76
DRG: 056 | End: 2021-11-01
Attending: PHYSICAL MEDICINE & REHABILITATION
Payer: MEDICARE

## 2021-11-01 LAB
ANION GAP SERPL CALCULATED.3IONS-SCNC: 5 MMOL/L (ref 3–14)
BUN SERPL-MCNC: 18 MG/DL (ref 7–30)
CALCIUM SERPL-MCNC: 8.6 MG/DL (ref 8.5–10.1)
CHLORIDE BLD-SCNC: 105 MMOL/L (ref 94–109)
CO2 SERPL-SCNC: 27 MMOL/L (ref 20–32)
CREAT SERPL-MCNC: 1.03 MG/DL (ref 0.66–1.25)
ERYTHROCYTE [DISTWIDTH] IN BLOOD BY AUTOMATED COUNT: 11.9 % (ref 10–15)
GFR SERPL CREATININE-BSD FRML MDRD: 69 ML/MIN/1.73M2
GLUCOSE BLD-MCNC: 134 MG/DL (ref 70–99)
GLUCOSE BLDC GLUCOMTR-MCNC: 128 MG/DL (ref 70–99)
GLUCOSE BLDC GLUCOMTR-MCNC: 129 MG/DL (ref 70–99)
GLUCOSE BLDC GLUCOMTR-MCNC: 136 MG/DL (ref 70–99)
GLUCOSE BLDC GLUCOMTR-MCNC: 141 MG/DL (ref 70–99)
GLUCOSE BLDC GLUCOMTR-MCNC: 170 MG/DL (ref 70–99)
GLUCOSE BLDC GLUCOMTR-MCNC: 170 MG/DL (ref 70–99)
HCT VFR BLD AUTO: 42.3 % (ref 40–53)
HGB BLD-MCNC: 14.4 G/DL (ref 13.3–17.7)
MCH RBC QN AUTO: 30.4 PG (ref 26.5–33)
MCHC RBC AUTO-ENTMCNC: 34 G/DL (ref 31.5–36.5)
MCV RBC AUTO: 89 FL (ref 78–100)
PLATELET # BLD AUTO: 169 10E3/UL (ref 150–450)
POTASSIUM BLD-SCNC: 4.1 MMOL/L (ref 3.4–5.3)
RBC # BLD AUTO: 4.74 10E6/UL (ref 4.4–5.9)
SODIUM SERPL-SCNC: 137 MMOL/L (ref 133–144)
WBC # BLD AUTO: 9 10E3/UL (ref 4–11)

## 2021-11-01 PROCEDURE — 80048 BASIC METABOLIC PNL TOTAL CA: CPT | Performed by: PHYSICIAN ASSISTANT

## 2021-11-01 PROCEDURE — 97129 THER IVNTJ 1ST 15 MIN: CPT | Mod: GO

## 2021-11-01 PROCEDURE — 97112 NEUROMUSCULAR REEDUCATION: CPT | Mod: GP

## 2021-11-01 PROCEDURE — 128N000003 HC R&B REHAB

## 2021-11-01 PROCEDURE — 99233 SBSQ HOSP IP/OBS HIGH 50: CPT | Performed by: PHYSICAL MEDICINE & REHABILITATION

## 2021-11-01 PROCEDURE — 97116 GAIT TRAINING THERAPY: CPT | Mod: GP | Performed by: PHYSICAL THERAPIST

## 2021-11-01 PROCEDURE — 36415 COLL VENOUS BLD VENIPUNCTURE: CPT | Performed by: PHYSICIAN ASSISTANT

## 2021-11-01 PROCEDURE — 250N000013 HC RX MED GY IP 250 OP 250 PS 637: Performed by: PHYSICIAN ASSISTANT

## 2021-11-01 PROCEDURE — 97116 GAIT TRAINING THERAPY: CPT | Mod: GP

## 2021-11-01 PROCEDURE — 85027 COMPLETE CBC AUTOMATED: CPT | Performed by: PHYSICIAN ASSISTANT

## 2021-11-01 PROCEDURE — 97535 SELF CARE MNGMENT TRAINING: CPT | Mod: GO

## 2021-11-01 PROCEDURE — 97530 THERAPEUTIC ACTIVITIES: CPT | Mod: GP | Performed by: PHYSICAL THERAPIST

## 2021-11-01 RX ORDER — ENALAPRIL MALEATE 5 MG/1
10 TABLET ORAL DAILY
Status: DISCONTINUED | OUTPATIENT
Start: 2021-11-02 | End: 2021-11-12

## 2021-11-01 RX ADMIN — INSULIN ASPART 1 UNITS: 100 INJECTION, SOLUTION INTRAVENOUS; SUBCUTANEOUS at 12:42

## 2021-11-01 RX ADMIN — METFORMIN HYDROCHLORIDE 1000 MG: 500 TABLET ORAL at 08:05

## 2021-11-01 RX ADMIN — ATORVASTATIN CALCIUM 40 MG: 40 TABLET, FILM COATED ORAL at 21:10

## 2021-11-01 RX ADMIN — CLOPIDOGREL BISULFATE 75 MG: 75 TABLET, FILM COATED ORAL at 08:05

## 2021-11-01 RX ADMIN — PANTOPRAZOLE SODIUM 40 MG: 40 TABLET, DELAYED RELEASE ORAL at 05:03

## 2021-11-01 RX ADMIN — ENALAPRIL MALEATE 5 MG: 5 TABLET ORAL at 08:05

## 2021-11-01 RX ADMIN — METFORMIN HYDROCHLORIDE 1000 MG: 500 TABLET ORAL at 18:32

## 2021-11-01 RX ADMIN — INSULIN ASPART 1 UNITS: 100 INJECTION, SOLUTION INTRAVENOUS; SUBCUTANEOUS at 08:02

## 2021-11-01 RX ADMIN — ASPIRIN 325 MG ORAL TABLET 325 MG: 325 PILL ORAL at 08:05

## 2021-11-01 ASSESSMENT — ACTIVITIES OF DAILY LIVING (ADL)
ADLS_ACUITY_SCORE: 12
ADLS_ACUITY_SCORE: 11
ADLS_ACUITY_SCORE: 12
ADLS_ACUITY_SCORE: 11
ADLS_ACUITY_SCORE: 12
ADLS_ACUITY_SCORE: 12
ADLS_ACUITY_SCORE: 14
ADLS_ACUITY_SCORE: 12
ADLS_ACUITY_SCORE: 12
ADLS_ACUITY_SCORE: 14
ADLS_ACUITY_SCORE: 11
ADLS_ACUITY_SCORE: 12
ADLS_ACUITY_SCORE: 14
ADLS_ACUITY_SCORE: 9
ADLS_ACUITY_SCORE: 11
ADLS_ACUITY_SCORE: 14
ADLS_ACUITY_SCORE: 14
ADLS_ACUITY_SCORE: 12
ADLS_ACUITY_SCORE: 12

## 2021-11-01 ASSESSMENT — MIFFLIN-ST. JEOR: SCORE: 1564.95

## 2021-11-01 NOTE — PLAN OF CARE
Discharge Planner Post-Acute Rehab OT:     Discharge Plan: home with wife    Precautions: falls    Current Status:  ADLs: G/H:  U/b dressing:ot increased sitting balance with dressing in button down shirt sitting eob to no lob to r  And no verbal or physical assistance. Pt also  increased fine motor  Buttoning 6 buttons without difficuly  L/B dressing: ot increased sitting balance with dressing in underwear and short sitting eob with no lob to r  And no verbal or physical assistance.  Feet: sba doff/don socks no difficulty with sequencing or lob to r with crossing leg over other to comlplete task  Shower: ot sba min cues for sequencing slower processing of shower task pt able to wash all dougie  inclusing crossing leg over other to gt lowerr leg and feet and standing with cga  with rail for pericares from front and back  IADLs: tba  Vision/Cognition: slums 20/30    Assessment:pt increased full body dressing with no leaning to r with task sitting eob.ot core balnce sitting on high stool with dynamic randow catch throw and  patterned motions of sh flex/ext and horizontal abd/add good balcne with task with no lob. ot pt seen for bed mobility and transfer bed to chair in room and problem solving, flexabile thinking and increasing attention to task with 12 step calculator task with calculator pt able to do 12 steps  and come up with correct answer, pt also lashonda to participate in card games with sequenince and flexable thinking and consentration components. Pt continues to benifit from ot per ot goals    Other Barriers to Discharge (DME, Family Training, etc): daughter in law PT pt has rolling walker and higher toilets at home

## 2021-11-01 NOTE — PLAN OF CARE
Individualized Overall Plan Of Care (IOPOC)      Rehab diagnosis/Impairment Group Code: Stroke ischemic 01.2 (r) body involvement (l) brain: l pontine stroke  Left pontine stroke (h)       Expected functional outcome: Home with outpatient therapy     Clinical Impression Comments: 76 year old man with a history of DMII, HLD, HTN, cerebellar arachnoid cyst, chronic lacunes, peripheral neuropathy, bilateral knee replacement, who has had approximately 2 years of slowly progressive gait instability.  He has had several falls during this time. He was referred by his PCP to see NSG for arachnoid cyst where he mentioned acute decline in his gait in past 1 week and was sent to ER for further evaluation.  MRI reveals subacute left pontine infarct. Pt also had a right pontine stroke in September 2021. 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 Loaded with plavix with plan for DAPT.     Mobility: PT presents to [facility] following CVA and is appropriate for skilled PT intervention due to limitations in L sided strength and coordination, poor balance and poor posture making discharge to home unsafe at this time.    ADL:  U/b dressing: button down shirt sba mod effort for buttons  L/B dressing: shorts sba leans to r while wsitting eob with tasks able to do from chair  Feet: sba  sitting in chair crossing leg over other  Shower: ot sba min cues for sequencing slower processing of shower task pt able to wash all dougie  inclusing crossing leg over other to gt lowerr leg and feet and standing with cga  with rail for pericares from front and back  IADLs: tba    Communication/Cognition/Swallow:       Intensity of therapy:   PT 90 minutes, Daily, for 10 days  OT 90 minutes, Daily, for 10 days    Orthotics None  Education bowel program, vitals, medication education, carryover of new rehab techniques, care coordination, skin integrity, blood  sugar management, diabetes education, assess neurologic status, stroke education and provide safe environment for patient at falls risk.  Neuropsychology Testing: No  Other:  None      Medical Prognosis: Fair    Physician summary statement:In addition to above statements address, Patient requires intensive active and ongoing therapeutic intervention and multiple therapies; Patient requires medical supervision; Expected to actively participate in the intensive rehab program; Sufficiently stable to actively participate; Expectation for measurable improvement in functional capacity or adaption to impairments.    Discharge destination: prior home  Discharge rehabilitation needs: outpatient      Estimated length of stay: 10 days      Rehabilitation Physician Fredis Ordonez MD

## 2021-11-01 NOTE — PROGRESS NOTES
It was a pleasure seeing Jose Mallory in clinic today with his family.  I saw him along with VENUS Ho.    Justino is a 76-year-old right-handed male with a past medical history of diabetes who takes a baby aspirin today.  He presents with a several month history of gait instability which has particularly been progressive over the last week.  He was able to ambulate up until today and is currently in a wheelchair.  He describes difficulty with coordination with ambulation really over a year and he started using a cane several months ago.  He has been having several falls and used a walker up until today.  He has undergone a neurology work-up and this included spine imaging.  He was told that he has spinal stenosis and was not referred for any further evaluation or management of this.  Imaging of his brain also revealed evidence of a retrocerebellar cyst for which he has been referred for further evaluation.  They are wondering if this could be the etiology of his gait progression.  He has had some bowel and bladder problems, endorsing leaking.  He has not had significant constipation.  He does not had any saddle anesthesia.  He describes that his feet have felt cold over the last year.    On exam, he is awake, alert and fully oriented.  Speech is fluent and appropriate.  His face is symmetric.  He has no aphasia or dysarthria.  His gaze is conjugate.  His pupils are equal and reactive.  His extraocular movements are full.  He has no pronator drift.  e does not have wasting.  He is diffusely weak.  His strength in the upper extremities grades about 4+ to 5 throughout.  In his lower extremities, he is also about 4+ to 5 throughout.  His strength is symmetric.  He is only able to take a few steps with significant assistance.    I reviewed his brain MRI scan and compared this to prior scans that he has had, dating back to a year ago.  He has brain MRI scans from February of last year, May of this year in  September of this year.  They reveal a small fluid collection posterior to the cerebellum which appears to be consistent with a Ant cisterna magna, or perhaps a retrocerebellar arachnoid cyst.  There is no mass-effect upon the cerebellum.  The cerebellar folia are normal with no compression.  Fourth ventricle is widely patent.  The aqueduct is open as is the fourth ventricular outlet.  There is no evidence of hydrocephalus.  There is some small S vessel disease which was already known.  There is no spine imaging to review.    Impression progressive gait deterioration, likely incidental small posterior fossa fluid collection with no evidence of brain compression    Plan I showed the family the imaging and we discussed the significance of the retrocerebellar fluid collection.  We feel this is of no clinical significance.  We feel that this does definitely not explain any of the symptoms he has been having over the last year or over the last week.  We feel that he needs an MRI scan of his spine and possibly further brain imaging urgently.  We try to get this arranged on campus however it is not possible to get him admitted to the hospital as there are no beds and also were not able to obtain an MRI scan for a few days.  We have asked for him to be fully evaluated in the [facility] emergency department where he can obtain an MRI scan more urgently.  We discussed that specifically were looking for spinal disease or other etiology which could explain his progression.  Family is amenable to this plan.

## 2021-11-01 NOTE — PROGRESS NOTES
"CLINICAL NUTRITION SERVICES - ASSESSMENT NOTE     Nutrition Prescription    RECOMMENDATIONS FOR MDs/PROVIDERS TO ORDER:  None today    Malnutrition Status:    Patient does not meet two of the established criteria necessary for diagnosing malnutrition but may be at risk for malnutrition    Recommendations already ordered by Registered Dietitian (RD):    Medical food supplement therapy - Ensure Elive daily @ breakfast (pref: chocolate or vanilla)    Future/Additional Recommendations:  Monitor PO intake, weight trends, supplement tolerance, snack requests and modify per pt preference      REASON FOR ASSESSMENT  Jose Mallory is a/an 76 year old male assessed by the dietitian for positive Admission Nutrition Risk Screen   Have you recently lost weight without trying? Y  How much? 2-13 pounds  Have you been eating poorly because of decreased appetite? Y    NUTRITION/MEDICAL HISTORY  Per chart review pt with PMHx of DMII, HLD, cerebellar arachnoid cyst, chronic lacunes, peripheral neuropathy, bilateral knee replacement who has had approximately 2 years of slowly progressive gait instability.     Visited pt at bedside while pt ordering lunch (sausage omelet, side pop, blueberry muffin, apple juice)  Pt reports he typically eats 3 meals/day, best meal is breakfast - when at home pt enjoys going out for breakfast. Endorsed some weight loss - stated UBW of 200# (~6 months ago). - reports decrease PO intake d/t disinterest in food/lack of appetite (\"wife says I'm becoming hard to cook for\" as nothing sounds good). Pt c/o recent episode diarrhea, however none today. No n/v/abd pain.     CURRENT NUTRITION ORDERS  Diet: Regular  Intake/Tolerance: good - mostly % of meals (1 meal @25%)    LABS  Labs reviewed    MEDICATIONS  Metformin, Novolog, pantoprazole, lipitor    ANTHROPOMETRICS  Height: 177.8 cm (5' 10\")  Most Recent Weight: 83.9 kg (184 lb 14.4 oz)    IBW:75.5 kg  BMI: Overweight BMI 25-29.9  Weight History: "   11/01/21 83.9 kg (184 lb 14.4 oz)   10/27/21 85.7 kg (189 lb) -1 week   10/26/21 85.8 kg (189 lb 2.5 oz)   9/23/21 89.8 kg (198 lb) - 1 mo   04/27/2021 90.2 kg (198 lb 11.9 oz) - 6 mo     Weight Assessment: -2.1% significant weight loss x 1 week, -6.7% significant weight loss x 1 month, -7% weight loss x 6 months    Dosing Weight: 77.6 kg (adjBW using IBW and CBW)    ASSESSED NUTRITION NEEDS  Estimated Energy Needs: 8697-1618 kcals/day (25 - 30 kcals/kg)  Justification: Maintenance  Estimated Protein Needs: 62-78 grams protein/day (0.8 - 1 grams of pro/kg)  Justification: Maintenance  Estimated Fluid Needs: 6331-1924 mL/day (1 mL/kcal)   Justification: Maintenance    PHYSICAL FINDINGS  See malnutrition section below.      MALNUTRITION  % Intake: Unable to assess  % Weight Loss: > 5% in 1 month (severe)  Subcutaneous Fat Loss: None observed  Muscle Loss: None observed  Fluid Accumulation/Edema: None noted  Malnutrition Diagnosis: Patient does not meet two of the established criteria necessary for diagnosing malnutrition, but may be at risk for malnutrition    NUTRITION DIAGNOSIS  Inadequate energy intake related to variable/decreased appetite as evidenced by pt report and -6.7% significant weight loss x 1 month.    INTERVENTIONS  Implementation  Nutrition Education: Introduced self and RD role, reviewed importance of adequate nutritional intakes for improved health status, discussed snack/meal choices    Medical food supplement therapy - Ensure Elive @ breakfast (chocolate or vanilla)    Goals  Patient to consume % of nutritionally adequate meal trays TID, or the equivalent with supplements/snacks.     Monitoring/Evaluation  Progress toward goals will be monitored and evaluated per protocol.    Emmie Schaffer RD, LD  [facility] RD pager: [phone]

## 2021-11-01 NOTE — PLAN OF CARE
FOCUS/GOAL  Medical management    ASSESSMENT, INTERVENTIONS AND CONTINUING PLAN FOR GOAL:  Patient here from Post Stroke with increase weakness, alert and oriented, A1 with walker, Continent of urine, urinal used at night. Had 2 episode of Continent loose stool overnight. Patient denies pain, no N&V, no chest pain , breathing normal, able to make needs known, call light within reach,  may continue to monitor.

## 2021-11-01 NOTE — PROGRESS NOTES
"  [facility]   Acute Rehabilitation Unit  Daily progress note    INTERVAL HISTORY  Jose Mallory was seen and examined at bedside this afternoon.  Weekend therapy and progress notes reviewed.  Patient did have an episode of emesis yesterday following his PT session.  He also noted a poor night of sleep due to 2 episodes of diarrhea.  He denies any nausea, abdominal pain.  No recurrent emesis or bowel movements today.  States his appetite is somewhat decreased.  He was feeling tired this morning due to interrupted sleep, but otherwise feels therapies are progressing well.  He denies shortness of breath, chest pain, dizziness or lightheadedness, urinary concerns, fever/chills.  He admits he is likely not getting enough fluids.  Cr increased from 0.86->1.03, likely hydration related in setting of emesis and diarrhea.  Encouraged fluids and will recheck on Wed.    Functionally, he is performing bed mobility with standby assist, though inconsistent pending fatigue.  He needs contact guard assist for stand pivot transfers and ambulation up to 150' with FWW.  He needs increased time for ambulation due to inconsistent gait pattern.  He demonstrates right lateral lean especially in sitting.     MEDICATIONS    aspirin  325 mg Oral Daily     atorvastatin  40 mg Oral QPM     clopidogrel  75 mg Oral Daily     enalapril  5 mg Oral Daily     influenza vac high-dose quad  0.7 mL Intramuscular Prior to discharge     insulin aspart  1-7 Units Subcutaneous TID AC     insulin aspart  1-5 Units Subcutaneous At Bedtime     metFORMIN  1,000 mg Oral BID w/meals     pantoprazole  40 mg Oral QAM AC        acetaminophen, glucose **OR** dextrose **OR** glucagon, melatonin, polyethylene glycol, senna-docusate     PHYSICAL EXAM  /88 (BP Location: Left arm)   Pulse 99   Temp 97.1  F (36.2  C) (Oral)   Resp 18   Ht 1.778 m (5' 10\")   Wt 83.9 kg (184 lb 14.4 oz)   SpO2 97%   BMI 26.53 kg/m    Gen: " NAD, pleasant, lying in bed  HEENT: NC/AT  Cardio: RRR, no murmurs  Pulm: non-labored on room air, lungs CTA bilaterally  Abd: soft, non-tender, non-distended, bowel sounds present  Ext: no edema bilaterally  Neuro/MSK: awake, alert, speech clear/fluent, follows basic commands, responds appropriately    LABS  CBC RESULTS: Recent Labs   Lab Test 11/01/21  0752 10/28/21  0654 10/27/21  0558   WBC 9.0 6.8 7.3   RBC 4.74 4.78 4.60   HGB 14.4 14.6 14.1   HCT 42.3 43.1 42.6   MCV 89 90 93   MCH 30.4 30.5 30.7   MCHC 34.0 33.9 33.1   RDW 11.9 12.0 12.4    153 144*     Last Basic Metabolic Panel:  Recent Labs   Lab Test 11/01/21  1635 11/01/21  1239 11/01/21  0752 10/27/21  1813 10/27/21  1253 10/27/21  1157 10/27/21  0554 10/26/21  1456 10/26/21  1456   NA  --   --  137  --  136  --   --   --  137   POTASSIUM  --   --  4.1  --  4.0  --  4.2   < > 4.1   CHLORIDE  --   --  105  --  104  --   --   --  105   CO2  --   --  27  --  19*  --   --   --  26   ANIONGAP  --   --  5  --  13  --   --   --  6   * 170* 134*   < > 182*   < >  --    < > 76   BUN  --   --  18  --  14  --   --   --  16   CR  --   --  1.03  --  0.86  0.86  --   --   --  0.86   GFRESTIMATED  --   --  69  --  83  83  --   --   --  83   LEON  --   --  8.6  --  9.2  --   --   --  9.0    < > = values in this interval not displayed.       Rehabilitation - continue comprehensive acute inpatient rehabilitation program with multidisciplinary approach including therapies, rehab nursing, and physiatry following. See interval history for updates.      ASSESSMENT AND PLAN  Jose Mallory is a 76 year old right hand dominant male with a past medical history of DM2, HTN, HLD, GERD, posterior fossa arachnoid cyst, peripheral neuropathy, prior R pontine stroke, gait instability, and bilateral knee replacement who was admitted on 10/26/21 with progressive lower extremity weakness and gait difficulties, found to have subacute left pontine stroke with course  complicated by neuropathy.  He is now admitted to ARU on 10/29 for multidisciplinary rehabilitation and ongoing medical management.     #Subacute L pontine stroke  #Subacute to chronic R pontine stroke  #Gait instability  #Arachnoid cyst  #Moderate cervical canal stenosis with disc herniation most pronounced at C3-C4, C4-C5   MRI with subacute cerebral infarction in left ventral cookie.  TTE EF 60-65%, no significant valvular disease or cardiac source for embolus.  Telemetry with NSR and no evidence of atrial fibrillation.  IV tPA was not initiated due to unclear or unfavorable risk-benefit profile for extended window thrombolysis beyond the conventional 4.5 hour time window.  Etiology of stroke thought to be atherosclerotic.  Gait instability suspected to be multifactorial including peripheral neuropathy, cerebellar disease, and recent bilateral pontine strokes.  - Continue DAPT with Aspirin 325 mg daily and Plavix 75 mg daily x90 days, then stop Plavix and continue Aspirin 325 mg daily only  - High intensity statin (atorvastatin 40 mg daily) to target LDL <70  - HbA1c at goal (6.3%)  - Long-term BP goal to 120/80  - Continue PT/OT  - Outpatient sleep evaluation for SELIN  - Follow up with neurology in 4-6 weeks  - Per hospital discharge summary, if gait has not improved after the pt's stay at acute rehab and/or by the time there is neurology follow up in the outpatient setting, please consider additional neurosurgery consultation.     #Peripheral neuropathy  #Decreased vibration sensation bilaterally in lower extremities  Per neurology, suspect that large fiber sensory dysfunction secondary to diabetes.  Vitamin B12/folate WNL, NEHA negative, HIV non reactive, RPR negative.  Copper, zinc WNL.  Protein electrophoresis with slightly elevated alpha 2, per pathologist, essentially normal, no obvious monoclonal proteins.  - Pending work-up: Homocysteine, MMA  - Follow up with neurology for EMG     #HTN  [PTA enalapril 5mg  PO daily]  - BP generally elevated above goal.  Increase enalapril to 10 mg daily  - Monitor BP, adjust meds as indicated     #DMII  [PTA meds: metformin 1000 mg BID, glipizide 10 mg daily]  A1c 6.3%.  Patient reports compliance with medications, but that he was not taking his blood glucose at home as directed.    - Monitor blood glucose TID AC and HS  - Continue PTA metformin 1g BID  - PTA glipizide 10 mg held throughout inpatient admission.  BG slightly elevated but also with variable intake.  Resume at low dose 2.5 mg daily, gradually titrate to home dose during rehab stay if tolerated/indicated.  - Hypoglycemia protocol  - Will need new glucometer and supplies, diabetes educator consulted      1. Adjustment to disability:  Clinical psychology to eval and treat as indicated  2. FEN: regular diet, thin liquids  3. Bowel: continent, no BM for several days on admission, then with episode of emesis 10/31 and loose stools x2, since resolved, continue to monitor  4. Bladder: continent, PVRs at admission x3 <100, ok to monitor PRN only  5. DVT Prophylaxis: mechanical  6. GI Prophylaxis: PPI given DAPT + age  7. Code: full, confirmed on admission  8. Disposition: goal for home  9. ELOS: 7 days  10. Follow up Appointments on Discharge: PCP, stroke and general neurology        Patient discussed with Dr. Fredis Ordonez, PM&R Staff Physician    Bozena Bower PAAnanyaC  Physical Medicine & Rehabilitation

## 2021-11-01 NOTE — PLAN OF CARE
A/Ox4, able to make needs known, uses call light appropriately. Contintent of bowel and bladder. Denies pain. A1 using ww and gait belt. Regular diet/thin liquids with good appetite. QID BG checks, 134 and 170. Skin intact. No acute concerns, call light within reach, bed alarms activated, continue POC.

## 2021-11-01 NOTE — PLAN OF CARE
A/Ox4, able to make needs known, uses call light appropriately. Contintent of bowel and bladder. Large BM this afternoon. Denies pain. A1 using ww and gait belt. Regular diet/thin liquids with good appetite. QID BG checks, 168 and 144. Skin intact. No acute concerns, call light within reach, bed alarms activated, continue POC.

## 2021-11-02 ENCOUNTER — APPOINTMENT (OUTPATIENT)
Dept: OCCUPATIONAL THERAPY | Facility: CLINIC | Age: 76
DRG: 056 | End: 2021-11-02
Attending: PHYSICAL MEDICINE & REHABILITATION
Payer: MEDICARE

## 2021-11-02 ENCOUNTER — APPOINTMENT (OUTPATIENT)
Dept: PHYSICAL THERAPY | Facility: CLINIC | Age: 76
DRG: 056 | End: 2021-11-02
Attending: PHYSICAL MEDICINE & REHABILITATION
Payer: MEDICARE

## 2021-11-02 LAB
GLUCOSE BLDC GLUCOMTR-MCNC: 104 MG/DL (ref 70–99)
GLUCOSE BLDC GLUCOMTR-MCNC: 141 MG/DL (ref 70–99)
GLUCOSE BLDC GLUCOMTR-MCNC: 157 MG/DL (ref 70–99)
GLUCOSE BLDC GLUCOMTR-MCNC: 158 MG/DL (ref 70–99)
GLUCOSE BLDC GLUCOMTR-MCNC: 189 MG/DL (ref 70–99)
GLUCOSE BLDC GLUCOMTR-MCNC: 66 MG/DL (ref 70–99)
GLUCOSE BLDC GLUCOMTR-MCNC: 67 MG/DL (ref 70–99)
GLUCOSE BLDC GLUCOMTR-MCNC: 83 MG/DL (ref 70–99)

## 2021-11-02 PROCEDURE — 97110 THERAPEUTIC EXERCISES: CPT | Mod: GO

## 2021-11-02 PROCEDURE — 99233 SBSQ HOSP IP/OBS HIGH 50: CPT | Performed by: PHYSICAL MEDICINE & REHABILITATION

## 2021-11-02 PROCEDURE — 250N000013 HC RX MED GY IP 250 OP 250 PS 637: Performed by: PHYSICIAN ASSISTANT

## 2021-11-02 PROCEDURE — 97110 THERAPEUTIC EXERCISES: CPT | Mod: GP

## 2021-11-02 PROCEDURE — 97116 GAIT TRAINING THERAPY: CPT | Mod: GP

## 2021-11-02 PROCEDURE — 97535 SELF CARE MNGMENT TRAINING: CPT | Mod: GO

## 2021-11-02 PROCEDURE — 97530 THERAPEUTIC ACTIVITIES: CPT | Mod: GP

## 2021-11-02 PROCEDURE — 128N000003 HC R&B REHAB

## 2021-11-02 PROCEDURE — 97530 THERAPEUTIC ACTIVITIES: CPT | Mod: GO

## 2021-11-02 PROCEDURE — 97112 NEUROMUSCULAR REEDUCATION: CPT | Mod: GP

## 2021-11-02 RX ADMIN — ATORVASTATIN CALCIUM 40 MG: 40 TABLET, FILM COATED ORAL at 21:06

## 2021-11-02 RX ADMIN — PANTOPRAZOLE SODIUM 40 MG: 40 TABLET, DELAYED RELEASE ORAL at 06:53

## 2021-11-02 RX ADMIN — ASPIRIN 325 MG ORAL TABLET 325 MG: 325 PILL ORAL at 09:06

## 2021-11-02 RX ADMIN — Medication 2.5 MG: at 09:06

## 2021-11-02 RX ADMIN — INSULIN ASPART 1 UNITS: 100 INJECTION, SOLUTION INTRAVENOUS; SUBCUTANEOUS at 17:55

## 2021-11-02 RX ADMIN — CLOPIDOGREL BISULFATE 75 MG: 75 TABLET, FILM COATED ORAL at 09:06

## 2021-11-02 RX ADMIN — METFORMIN HYDROCHLORIDE 1000 MG: 500 TABLET ORAL at 09:06

## 2021-11-02 RX ADMIN — INSULIN ASPART 1 UNITS: 100 INJECTION, SOLUTION INTRAVENOUS; SUBCUTANEOUS at 09:05

## 2021-11-02 RX ADMIN — ENALAPRIL MALEATE 10 MG: 5 TABLET ORAL at 09:06

## 2021-11-02 RX ADMIN — METFORMIN HYDROCHLORIDE 1000 MG: 500 TABLET ORAL at 17:55

## 2021-11-02 ASSESSMENT — ACTIVITIES OF DAILY LIVING (ADL)
ADLS_ACUITY_SCORE: 9

## 2021-11-02 ASSESSMENT — MIFFLIN-ST. JEOR: SCORE: 1566.31

## 2021-11-02 NOTE — PLAN OF CARE
"Discharge Planner Post-Acute Rehab PT:      Discharge Plan: Home with OP PT with discharge date anticipated 11/8     Precautions: Fall precaution     Current Status:  Bed Mobility: SBA, but inconsistent with fatigue levels  Transfer: CGA with fww to complete stand pivot transfer  Gait: AMb >150 with fww and CGA.  Increased time needed due to inconsistent gait pattern due to LLE inconsistent activation and pt attention  Stairs: 8 6\" steps with B railings and reciprocal pattern needing increased UE support for L step;  limited due to dizziness  Balance: Fair with R lateral lean especially in sitting   PASS:  22/36 at Eval 10/30  Haas 18/56  Gait Speed: .19 m/s     Assessment: Pt tired at start of AM session but with movement woke up and reportedly felt \"much better\". Pt ambulated 200' x 2 with FWW, frequent to near constant cuing for longer L LE step length which pt is able to demo but not consistently. Pt has difficulty attending to a task while talking, easily distracted.      Other Barriers to Discharge (DME, Family Training, etc): None  "

## 2021-11-02 NOTE — PROGRESS NOTES
SPIRITUAL HEALTH SERVICES  SPIRITUAL ASSESSMENT Progress Note  [facility] ([facility] [place] ARU R508 11/2      REFERRAL SOURCE: Follow Up Visit    Pt mentioned he is feeling wonderful and was not sure of the discharge date. Pt seemed to be excited about discharging home. [place] was provided a prayer for all the needs to work out so he can leave such as progress in therapies.    PLAN: If pt remains in unit [facility] will follow up with him.    Nancy Tolentino  Associate    Pager: [phone]

## 2021-11-02 NOTE — PLAN OF CARE
Problem: Goal/Outcome  Goal: Goal Outcome Summary  Outcome: No Change   FOCUS/GOAL  Bladder management, Medical management, and Mobility    ASSESSMENT, INTERVENTIONS AND CONTINUING PLAN FOR GOAL:  Pt is alert and oriented x 4 but forgetful. His BP was elevated this morning before morning BP med but better when rechecked mid morning. Pt denied symptoms when BP was elevated. Pt's  this morning but 66 before lunch. Denied symptoms of hypoglycemia. Given 4 oz apple juice. BGs checked frequently until BG >100. Pt denied any pain. Able to use call light for assist. Steadying assist with transfers and walking in room using the walker.

## 2021-11-02 NOTE — PLAN OF CARE
FOCUS/GOAL  Bowel management, Bladder management, Pain management and Cognition/Memory/Judgment/Problem solving    ASSESSMENT, INTERVENTIONS AND CONTINUING PLAN FOR GOAL:  Pt is alert and oriented x4, denies fever, chills, chest pain, SOB, N/V, abdominal pain, or new weakness/numbness/tingling, continent of bowel and bladder, using urinal overnight, transferring with assist of 1 and ww, sleeping well throughout the night, no tubes, lines or drains, vs stable, pt refused to have weight taken overnight, no further care concerns at this time continue with POC.

## 2021-11-02 NOTE — PROGRESS NOTES
"  [facility]   Acute Rehabilitation Unit  Daily progress note    INTERVAL HISTORY  Jose Mallory was seen and examined at bedside this morning.  No acute events reported overnight.  Patient reports that he had a much better night of sleep last night.  He denies any recurrent nausea/vomiting or diarrhea.  He has not had a bowel movement since 2 episodes of diarrhea the night before last.  He had dinner last night with no GI upset.  He denies any other concerns and is enjoying therapies.     Functionally, he is currently needing standby assist for bed mobility, contact guard assist for transfers and ambulation >150' with FWW.  He completed 8-6\" steps with bilateral railing needing increased UE support for L step and somewhat limited by dizziness.  Therapists also noting R lateral lean, especially in sitting.  He increased full body dressing with less lean.  He participated in some card games with OT for sequence, flexible thinking, and concentration.    MEDICATIONS    aspirin  325 mg Oral Daily     atorvastatin  40 mg Oral QPM     clopidogrel  75 mg Oral Daily     enalapril  10 mg Oral Daily     glipiZIDE  2.5 mg Oral QAM AC     influenza vac high-dose quad  0.7 mL Intramuscular Prior to discharge     insulin aspart  1-7 Units Subcutaneous TID AC     insulin aspart  1-5 Units Subcutaneous At Bedtime     metFORMIN  1,000 mg Oral BID w/meals     pantoprazole  40 mg Oral QAM AC        acetaminophen, glucose **OR** dextrose **OR** glucagon, melatonin, polyethylene glycol, senna-docusate     PHYSICAL EXAM  BP (!) 147/76 (BP Location: Left arm)   Pulse 87   Temp 97.1  F (36.2  C) (Oral)   Resp 16   Ht 1.778 m (5' 10\")   Wt 83.9 kg (184 lb 14.4 oz)   SpO2 95%   BMI 26.53 kg/m    Gen: NAD, pleasant, lying in bed  HEENT: NC/AT  Cardio: RRR, no murmurs  Pulm: non-labored on room air, lungs CTA bilaterally  Abd: soft, non-tender, non-distended, bowel sounds present  Ext: no edema " bilaterally  Neuro/MSK: awake, alert, speech clear/fluent, follows basic commands, responds appropriately    LABS  CBC RESULTS:   Recent Labs   Lab Test 11/01/21  0752 10/28/21  0654 10/27/21  0558   WBC 9.0 6.8 7.3   RBC 4.74 4.78 4.60   HGB 14.4 14.6 14.1   HCT 42.3 43.1 42.6   MCV 89 90 93   MCH 30.4 30.5 30.7   MCHC 34.0 33.9 33.1   RDW 11.9 12.0 12.4    153 144*     Last Basic Metabolic Panel:  Recent Labs   Lab Test 11/02/21  0825 11/02/21  0200 11/01/21  2147 11/01/21  1239 11/01/21  0752 10/27/21  1813 10/27/21  1253 10/27/21  1157 10/27/21  0554 10/26/21  1456 10/26/21  1456   NA  --   --   --   --  137  --  136  --   --   --  137   POTASSIUM  --   --   --   --  4.1  --  4.0  --  4.2   < > 4.1   CHLORIDE  --   --   --   --  105  --  104  --   --   --  105   CO2  --   --   --   --  27  --  19*  --   --   --  26   ANIONGAP  --   --   --   --  5  --  13  --   --   --  6   * 141* 170*   < > 134*   < > 182*   < >  --    < > 76   BUN  --   --   --   --  18  --  14  --   --   --  16   CR  --   --   --   --  1.03  --  0.86  0.86  --   --   --  0.86   GFRESTIMATED  --   --   --   --  69  --  83  83  --   --   --  83   LEON  --   --   --   --  8.6  --  9.2  --   --   --  9.0    < > = values in this interval not displayed.       Rehabilitation - continue comprehensive acute inpatient rehabilitation program with multidisciplinary approach including therapies, rehab nursing, and physiatry following. See interval history for updates.      ASSESSMENT AND PLAN  Jose Mallory is a 76 year old right hand dominant male with a past medical history of DM2, HTN, HLD, GERD, posterior fossa arachnoid cyst, peripheral neuropathy, prior R pontine stroke, gait instability, and bilateral knee replacement who was admitted on 10/26/21 with progressive lower extremity weakness and gait difficulties, found to have subacute left pontine stroke with course complicated by neuropathy.  He is now admitted to ARU on 10/29 for  multidisciplinary rehabilitation and ongoing medical management.     #Subacute L pontine stroke  #Subacute to chronic R pontine stroke  #Gait instability  #Arachnoid cyst  #Moderate cervical canal stenosis with disc herniation most pronounced at C3-C4, C4-C5   MRI with subacute cerebral infarction in left ventral cookie.  TTE EF 60-65%, no significant valvular disease or cardiac source for embolus.  Telemetry with NSR and no evidence of atrial fibrillation.  IV tPA was not initiated due to unclear or unfavorable risk-benefit profile for extended window thrombolysis beyond the conventional 4.5 hour time window.  Etiology of stroke thought to be atherosclerotic.  Gait instability suspected to be multifactorial including peripheral neuropathy, cerebellar disease, and recent bilateral pontine strokes.  - Continue DAPT with Aspirin 325 mg daily and Plavix 75 mg daily x90 days, then stop Plavix and continue Aspirin 325 mg daily only  - High intensity statin (atorvastatin 40 mg daily) to target LDL <70  - HbA1c at goal (6.3%)  - Long-term BP goal to 120/80  - Continue PT/OT  - Outpatient sleep evaluation for SELIN  - Follow up with neurology in 4-6 weeks  - Per hospital discharge summary, if gait has not improved after the pt's stay at acute rehab and/or by the time there is neurology follow up in the outpatient setting, please consider additional neurosurgery consultation.     #Peripheral neuropathy  #Decreased vibration sensation bilaterally in lower extremities  Per neurology, suspect that large fiber sensory dysfunction secondary to diabetes.  Vitamin B12/folate WNL, NEHA negative, HIV non reactive, RPR negative.  Copper, zinc WNL.  Protein electrophoresis with slightly elevated alpha 2, per pathologist, essentially normal, no obvious monoclonal proteins.  - Pending work-up: Homocysteine, MMA  - Follow up with neurology for EMG     #HTN  [PTA enalapril 5mg PO daily]  - BP generally elevated above goal.  Enalapril increased  to 10 mg daily  - Monitor BP, adjust meds as indicated     #DMII  [PTA meds: metformin 1000 mg BID, glipizide 10 mg daily]  A1c 6.3%.  Patient reports compliance with medications, but that he was not taking his blood glucose at home as directed.    - Monitor blood glucose TID AC and HS  - Continue PTA metformin 1g BID  - PTA glipizide 10 mg held throughout inpatient admission.  BG slightly elevated but also with variable intake.  Resumed at low dose 2.5 mg daily this morning, gradually titrate to home dose during rehab stay if tolerated/indicated.  - Hypoglycemia protocol  - Will need new glucometer and supplies, diabetes educator consulted      1. Adjustment to disability:  Clinical psychology to eval and treat as indicated  2. FEN: regular diet, thin liquids  3. Bowel: continent, no BM for several days on admission, then with episode of emesis 10/31 and loose stools x2, since resolved, continue to monitor  4. Bladder: continent, PVRs at admission x3 <100, ok to monitor PRN only  5. DVT Prophylaxis: mechanical  6. GI Prophylaxis: PPI given DAPT + age  7. Code: full, confirmed on admission  8. Disposition: goal for home  9. ELOS: 7 days  10. Follow up Appointments on Discharge: PCP, stroke and general neurology        Patient discussed with Dr. Fredis Ordonez, PM&R Staff Physician    Bozena Bower PA-C  Physical Medicine & Rehabilitation

## 2021-11-02 NOTE — PLAN OF CARE
"Discharge Planner Post-Acute Rehab OT:     Discharge Plan: home with wife    Precautions: falls    Current Status:  ADLs: G/H:  U/b dressing:ot increased sitting balance with dressing in button down shirt sitting eob to no lob to r  And no verbal or physical assistance. Pt also  increased fine motor  Buttoning 6 buttons without difficuly  L/B dressing: ot increased sitting balance with dressing in underwear and short sitting eob with no lob to r  And no verbal or physical assistance.  Feet: sba doff/don socks no difficulty with sequencing or lob to r with crossing leg over other to comlplete task  Shower: ot sba min cues for sequencing slower processing of shower task pt able to wash all dougie  inclusing crossing leg over other to gt lowerr leg and feet and standing with cga  with rail for pericares from front and back  IADLs: tba  Vision/Cognition: slums 20/30    Assessment:pt c/o weakness and \"off balance\" like he is \"bobbing and weaving\" during am session. Pt limited by this but agreeable for strengthening ex and functional tsfs increasing to CGA from 17\" chair with no arms. Pt much improved with pm tx, tolerating IADL kitchen task (SBA-CGA w/fww), and ambulating back to room without rest break. Cont per POC  Other Barriers to Discharge (DME, Family Training, etc): daughter in law PT pt has rolling walker and higher toilets at home   "

## 2021-11-02 NOTE — PLAN OF CARE
FOCUS/GOAL  Bladder management, Nutrition/Feeding/Swallowing precautions, and Medical management    ASSESSMENT, INTERVENTIONS AND CONTINUING PLAN FOR GOAL:  Pt was continent of bladder this shift, last BM this morning shift.  Per pt report he used the toilet.  Pt encouraged to increase hydration per provider order, pt verbalized understanding.  Pt verbalized that he is encouraged by his physical progress and hopeful about the future.

## 2021-11-03 ENCOUNTER — APPOINTMENT (OUTPATIENT)
Dept: PHYSICAL THERAPY | Facility: CLINIC | Age: 76
DRG: 056 | End: 2021-11-03
Attending: PHYSICAL MEDICINE & REHABILITATION
Payer: MEDICARE

## 2021-11-03 ENCOUNTER — APPOINTMENT (OUTPATIENT)
Dept: OCCUPATIONAL THERAPY | Facility: CLINIC | Age: 76
DRG: 056 | End: 2021-11-03
Attending: PHYSICAL MEDICINE & REHABILITATION
Payer: MEDICARE

## 2021-11-03 LAB
ANION GAP SERPL CALCULATED.3IONS-SCNC: 4 MMOL/L (ref 3–14)
BUN SERPL-MCNC: 16 MG/DL (ref 7–30)
CALCIUM SERPL-MCNC: 8.3 MG/DL (ref 8.5–10.1)
CHLORIDE BLD-SCNC: 106 MMOL/L (ref 94–109)
CO2 SERPL-SCNC: 29 MMOL/L (ref 20–32)
CREAT SERPL-MCNC: 1.13 MG/DL (ref 0.66–1.25)
GFR SERPL CREATININE-BSD FRML MDRD: 62 ML/MIN/1.73M2
GLUCOSE BLD-MCNC: 128 MG/DL (ref 70–99)
GLUCOSE BLDC GLUCOMTR-MCNC: 138 MG/DL (ref 70–99)
GLUCOSE BLDC GLUCOMTR-MCNC: 153 MG/DL (ref 70–99)
GLUCOSE BLDC GLUCOMTR-MCNC: 168 MG/DL (ref 70–99)
GLUCOSE BLDC GLUCOMTR-MCNC: 200 MG/DL (ref 70–99)
HCYS SERPL-SCNC: 9.2 UMOL/L (ref 4–12)
POTASSIUM BLD-SCNC: 4.1 MMOL/L (ref 3.4–5.3)
SODIUM SERPL-SCNC: 139 MMOL/L (ref 133–144)

## 2021-11-03 PROCEDURE — 128N000003 HC R&B REHAB

## 2021-11-03 PROCEDURE — 999N000125 HC STATISTIC PATIENT MED CONFERENCE < 30 MIN

## 2021-11-03 PROCEDURE — 97112 NEUROMUSCULAR REEDUCATION: CPT | Mod: GP | Performed by: PHYSICAL THERAPIST

## 2021-11-03 PROCEDURE — 97116 GAIT TRAINING THERAPY: CPT | Mod: GP | Performed by: PHYSICAL THERAPIST

## 2021-11-03 PROCEDURE — 250N000013 HC RX MED GY IP 250 OP 250 PS 637: Performed by: PHYSICIAN ASSISTANT

## 2021-11-03 PROCEDURE — 97530 THERAPEUTIC ACTIVITIES: CPT | Mod: GP | Performed by: PHYSICAL THERAPIST

## 2021-11-03 PROCEDURE — 97535 SELF CARE MNGMENT TRAINING: CPT | Mod: GO

## 2021-11-03 PROCEDURE — 80048 BASIC METABOLIC PNL TOTAL CA: CPT | Performed by: PHYSICIAN ASSISTANT

## 2021-11-03 PROCEDURE — 99233 SBSQ HOSP IP/OBS HIGH 50: CPT | Performed by: PHYSICAL MEDICINE & REHABILITATION

## 2021-11-03 PROCEDURE — 97530 THERAPEUTIC ACTIVITIES: CPT | Mod: GO

## 2021-11-03 PROCEDURE — 999N000150 HC STATISTIC PT MED CONFERENCE < 30 MIN: Performed by: PHYSICAL THERAPIST

## 2021-11-03 PROCEDURE — 97110 THERAPEUTIC EXERCISES: CPT | Mod: GO

## 2021-11-03 PROCEDURE — 36415 COLL VENOUS BLD VENIPUNCTURE: CPT | Performed by: PHYSICIAN ASSISTANT

## 2021-11-03 RX ORDER — BISACODYL 10 MG
10 SUPPOSITORY, RECTAL RECTAL ONCE
Status: DISCONTINUED | OUTPATIENT
Start: 2021-11-03 | End: 2021-11-04

## 2021-11-03 RX ADMIN — METFORMIN HYDROCHLORIDE 1000 MG: 500 TABLET ORAL at 17:10

## 2021-11-03 RX ADMIN — METFORMIN HYDROCHLORIDE 1000 MG: 500 TABLET ORAL at 08:19

## 2021-11-03 RX ADMIN — DOCUSATE SODIUM AND SENNOSIDES 1 TABLET: 8.6; 5 TABLET ORAL at 08:29

## 2021-11-03 RX ADMIN — CLOPIDOGREL BISULFATE 75 MG: 75 TABLET, FILM COATED ORAL at 08:19

## 2021-11-03 RX ADMIN — ASPIRIN 325 MG ORAL TABLET 325 MG: 325 PILL ORAL at 08:19

## 2021-11-03 RX ADMIN — POLYETHYLENE GLYCOL 3350 17 G: 17 POWDER, FOR SOLUTION ORAL at 08:29

## 2021-11-03 RX ADMIN — ENALAPRIL MALEATE 10 MG: 5 TABLET ORAL at 08:19

## 2021-11-03 RX ADMIN — INSULIN ASPART 1 UNITS: 100 INJECTION, SOLUTION INTRAVENOUS; SUBCUTANEOUS at 17:11

## 2021-11-03 RX ADMIN — ATORVASTATIN CALCIUM 40 MG: 40 TABLET, FILM COATED ORAL at 20:16

## 2021-11-03 RX ADMIN — PANTOPRAZOLE SODIUM 40 MG: 40 TABLET, DELAYED RELEASE ORAL at 06:20

## 2021-11-03 RX ADMIN — DOCUSATE SODIUM AND SENNOSIDES 1 TABLET: 8.6; 5 TABLET ORAL at 20:19

## 2021-11-03 ASSESSMENT — ACTIVITIES OF DAILY LIVING (ADL)
ADLS_ACUITY_SCORE: 10
ADLS_ACUITY_SCORE: 9
ADLS_ACUITY_SCORE: 8
ADLS_ACUITY_SCORE: 9
ADLS_ACUITY_SCORE: 8
ADLS_ACUITY_SCORE: 9
ADLS_ACUITY_SCORE: 8
ADLS_ACUITY_SCORE: 10
ADLS_ACUITY_SCORE: 9
ADLS_ACUITY_SCORE: 10
ADLS_ACUITY_SCORE: 9
ADLS_ACUITY_SCORE: 8
ADLS_ACUITY_SCORE: 9
ADLS_ACUITY_SCORE: 10
ADLS_ACUITY_SCORE: 9
ADLS_ACUITY_SCORE: 9
ADLS_ACUITY_SCORE: 10
ADLS_ACUITY_SCORE: 9
ADLS_ACUITY_SCORE: 10
ADLS_ACUITY_SCORE: 9

## 2021-11-03 ASSESSMENT — MIFFLIN-ST. JEOR: SCORE: 1567.22

## 2021-11-03 NOTE — PLAN OF CARE
FOCUS/GOAL  Bowel management, Bladder management, Medication management, Pain management, Medical management, Mobility, Skin integrity, and Safety management    ASSESSMENT, INTERVENTIONS AND CONTINUING PLAN FOR GOAL:      Pt is alert and oriented x 4. Continent of bowel and bladder. Pt is voilding small amounts of dark elsie urine and experiencing constipation. Encouraged pt to drink more fluids and prn bowel meds given.  Ax1 transfers. Reg diet, thin liquids, poor appetite, encouraged eating even if he does not feel like eating. Denied pain, nausea and vomiting, numbness or tingling, SOB. Alarms on, call light within reach. Will continue with plan of care.

## 2021-11-03 NOTE — PROGRESS NOTES
[facility]   Acute Rehabilitation Unit  Daily progress note    INTERVAL HISTORY  Jose Mallory was seen at bedside this morning during team rounds.  No acute events reported overnight.  Nursing reports patient has not had a bowel movement since 2 episodes of diarrhea overnight 10/31-11/1.  He denies any abdominal pain or recurrent nausea.  He was given PRN meds, so will continue to monitor.  He continues to have poor appetite and is not drinking well, but is agreeable to try to take more water and supplements.  RD will have staff assist with ordering to promote consistent meals.  He is feeling down about news that he will likely need a longer course of rehab than initially planned.  He is anxious about his wife, who is in the hospital, and struggling with being far from family.  He is open to considering possible support from psychology, but would like to think about it more.    Functionally, he demonstrates significant impairments in gait and balance.  Gait is festinating, episodes of freezing noted, difficulty initiating.  PT also noting cogwheelin on RUE, not reproducible on today's exam.  Concerns for some additional underlying neurological processes.  OT notes improvements in dressing. For full functional updates, see team rounds note from today.    MEDICATIONS    aspirin  325 mg Oral Daily     atorvastatin  40 mg Oral QPM     clopidogrel  75 mg Oral Daily     enalapril  10 mg Oral Daily     [Held by provider] glipiZIDE  2.5 mg Oral QAM AC     influenza vac high-dose quad  0.7 mL Intramuscular Prior to discharge     insulin aspart  1-7 Units Subcutaneous TID AC     insulin aspart  1-5 Units Subcutaneous At Bedtime     metFORMIN  1,000 mg Oral BID w/meals     pantoprazole  40 mg Oral QAM AC        acetaminophen, glucose **OR** dextrose **OR** glucagon, melatonin, polyethylene glycol, senna-docusate     PHYSICAL EXAM  /81 (BP Location: Left arm)   Pulse 91   Temp  "97.4  F (36.3  C) (Oral)   Resp 20   Ht 1.778 m (5' 10\")   Wt 84.1 kg (185 lb 6.4 oz)   SpO2 98%   BMI 26.60 kg/m     Gen: NAD, pleasant, lying in bed  HEENT: NC/AT  Cardio: appears well-perfused  Pulm: non-labored on room air  Abd: soft, non-tender, non-distended, bowel sounds present  Ext: no edema bilaterally  Neuro/MSK: awake, alert, speech clear/fluent, follows basic commands, responds appropriately    LABS  CBC RESULTS:   Recent Labs   Lab Test 11/01/21  0752 10/28/21  0654 10/27/21  0558   WBC 9.0 6.8 7.3   RBC 4.74 4.78 4.60   HGB 14.4 14.6 14.1   HCT 42.3 43.1 42.6   MCV 89 90 93   MCH 30.4 30.5 30.7   MCHC 34.0 33.9 33.1   RDW 11.9 12.0 12.4    153 144*     Last Basic Metabolic Panel:  Recent Labs   Lab Test 11/03/21  0732 11/03/21  0634 11/03/21  0205 11/01/21  1239 11/01/21  0752 10/27/21  1813 10/27/21  1253   NA  --  139  --   --  137  --  136   POTASSIUM  --  4.1  --   --  4.1  --  4.0   CHLORIDE  --  106  --   --  105  --  104   CO2  --  29  --   --  27  --  19*   ANIONGAP  --  4  --   --  5  --  13   * 128* 168*   < > 134*   < > 182*   BUN  --  16  --   --  18  --  14   CR  --  1.13  --   --  1.03  --  0.86  0.86   GFRESTIMATED  --  62  --   --  69  --  83  83   LEON  --  8.3*  --   --  8.6  --  9.2    < > = values in this interval not displayed.       Rehabilitation - continue comprehensive acute inpatient rehabilitation program with multidisciplinary approach including therapies, rehab nursing, and physiatry following. See interval history for updates.      ASSESSMENT AND PLAN  Jose Mallory is a 76 year old right hand dominant male with a past medical history of DM2, HTN, HLD, GERD, posterior fossa arachnoid cyst, peripheral neuropathy, prior R pontine stroke, gait instability, and bilateral knee replacement who was admitted on 10/26/21 with progressive lower extremity weakness and gait difficulties, found to have subacute left pontine stroke with course complicated by " neuropathy.  He is now admitted to ARU on 10/29 for multidisciplinary rehabilitation and ongoing medical management.     #Subacute L pontine stroke  #Subacute to chronic R pontine stroke  #Gait instability  #Arachnoid cyst  #Moderate cervical canal stenosis with disc herniation most pronounced at C3-C4, C4-C5   MRI with subacute cerebral infarction in left ventral cookie.  TTE EF 60-65%, no significant valvular disease or cardiac source for embolus.  Telemetry with NSR and no evidence of atrial fibrillation.  IV tPA was not initiated due to unclear or unfavorable risk-benefit profile for extended window thrombolysis beyond the conventional 4.5 hour time window.  Etiology of stroke thought to be atherosclerotic. Gait instability suspected to be multifactorial including peripheral neuropathy, cerebellar disease, and recent bilateral pontine strokes.  - Continue DAPT with Aspirin 325 mg daily and Plavix 75 mg daily x90 days, then stop Plavix and continue Aspirin 325 mg daily only  - High intensity statin (atorvastatin 40 mg daily) to target LDL <70  - HbA1c at goal (6.3%)  - Long-term BP goal to 120/80  - PT noting festinating gait, very slow gait, intermittent freezing, cogwheeling, difficulty initiating motor tasks.  Consider neurology consult while remains at ARU to evaluate and potentially trial medication.  - Continue PT/OT  - Outpatient sleep evaluation for SELIN  - Follow up with neurology in 4-6 weeks  - Per hospital discharge summary, if gait has not improved after the pt's stay at acute rehab and/or by the time there is neurology follow up in the outpatient setting, please consider additional neurosurgery consultation.     #Peripheral neuropathy  #Decreased vibration sensation bilaterally in lower extremities  Per neurology, suspect that large fiber sensory dysfunction secondary to diabetes.  Vitamin B12/folate WNL, NEHA negative, HIV non reactive, RPR negative.  Copper, zinc WNL.  Protein electrophoresis with  slightly elevated alpha 2, per pathologist, essentially normal, no obvious monoclonal proteins.  - Pending work-up: Homocysteine, MMA  - Follow up with neurology for EMG     #HTN  [PTA enalapril 5mg PO daily]  - BP generally elevated above goal.  Enalapril increased to 10 mg daily on 11/2 with improvement.  - Continue to monitor BP, adjust meds as indicated     #DMII  [PTA meds: metformin 1000 mg BID, glipizide 10 mg daily]  A1c 6.3%.  Patient reports compliance with medications, but that he was not taking his blood glucose at home as directed.    - Monitor blood glucose TID AC and HS  - Continue PTA metformin 1g BID  - PTA glipizide 10 mg held throughout inpatient admission.  BG slightly elevated but also with variable intake.  Resumed at low dose 2.5 mg daily 11/2 but with mild hypoglycemia (66), now on hold until intake improved.  - Hypoglycemia protocol  - Will need new glucometer and supplies, diabetes educator consulted    Cr uptrending  0.86 -> 1.03 -> 1.13.  Poor oral fluid intake, also recently increased dose of enalapril. Patient agreeable to increasing oral fluids today.    - Encourage fluids  - Repeat BMP tomorrow, if persists may need IV fluids      1. Adjustment to disability:  Endorsing some PTA depression and having difficulty coping with current situation, especially today with prospect of needing longer ARU stay than initially anticipated.  Have offered clinical psychology, but patient would like to consider before agreeing.  2. FEN: regular diet, thin liquids  3. Bowel: continent, no BM for several days on admission, then with episode of emesis 10/31 and loose stools x2, now with recurrent constipation.   Received Miralax and Senokot-S this morning.  If no BM by this evening, trial suppository.  4. Bladder: continent, PVRs at admission x3 <100, ok to monitor PRN only  5. DVT Prophylaxis: mechanical  6. GI Prophylaxis: PPI given DAPT + age  7. Code: full, confirmed on admission  8. Disposition:  goal for home  9. ELOS: 7 days  10. Follow up Appointments on Discharge: PCP, stroke and general neurology      I, Bozena Bower, spent a total of 35 minutes face-to-face or managing the care of Jose Mallory. Over 50% of my time on the unit was spent counseling the patient and coordinating care. See note for details.       Patient seen and discussed with Dr. Fredis Ordonez, PM&R Staff Physician    Bozena Bower PA-C  Physical Medicine & Rehabilitation

## 2021-11-03 NOTE — PLAN OF CARE
FOCUS/GOAL  Bladder management, Nutrition/Feeding/Swallowing precautions, and Mobility    ASSESSMENT, INTERVENTIONS AND CONTINUING PLAN FOR GOAL:  Pt A/O x 4, Assist of 1 with walker.  Blood sugar this shift were 157 before dinner and 178 at bedtime.  Pt is not drinking much fluid, needs encouragement regularly to increase his intake.  Pt mentioned his spouse was hospitalized this evening, he verbalized that he is worried and stressed about her.

## 2021-11-03 NOTE — PROGRESS NOTES
FOCUS/GOAL  Bowel management, Bladder management, Pain management, Medical management, Skin integrity and Cognition/Memory/Judgment/Problem solving    ASSESSMENT, INTERVENTIONS AND CONTINUING PLAN FOR GOAL:  Pt slept well overnight. No complaints of pain, SOB, dizziness/headache or new numbness and tingling.

## 2021-11-03 NOTE — CARE CONFERENCE
Acute Rehab Care Conference/Team Rounds      Type: Team Rounds    Present: Dr Fredis Ordonez, Bozena Bower PA, Suzie Osuna RN, Anne-Marie Siddiqui PT, Iris Marr OT, [facility], Ynes Emery Dietician, Patient Jose Mallory      Discharge Barriers/Treatment/Education    Rehab Diagnosis: Stroke Ischemic 01.2 (R) Body Involvement (L) Brain: L pontine stroke    Active Medical Co-morbidities/Prognosis:   Patient Active Problem List   Diagnosis     Gait difficulty     Weakness     Left pontine stroke (H)        Safety: A&Ox4. A1 with walker.     Pain: denies pain    Medications, Skin, Tubes/Lines: Would benefit from MAP prior to discharge. Takes pills whole. No skin issues, no tubes/lines.    Swallowing/Nutrition:    Bowel/Bladder: Continent of bowel and bladder. Use of urinal overnight. LBM 11/1    Psychosocial: Jose is a 75 yo right hand dominant male, who is a retired farmer and living with spouse in [place], [place]. House has three steps to enter onto deck with bilateral rails, 2-story home (railings on L. Side of stairs inside home). Jose lives on main level, all basic needs met on main level. Jose's disposition is appropriate and peasant. Jose identifies as Presbyterian. Wife is primary support and is at home 24/7. Wife has dx of pleurisy on 10/31/21. Jose has son and DIL living across the road from him. He also has other support from two other adult children who live 15 miles and 50 miles away from him. Jose also reports good support from friends. Jose denies having any MH dx or any use of Alcohol/Tabacco/Narcotics. Supportive services of Psychology and  offered.       ADLs/IADLs:U/b dressing:ot increased sitting balance with dressing in button down shirt sitting eob to no lob to r  And no verbal or physical assistance. Pt also  increased fine motor  Buttoning 6 buttons without difficuly  L/B dressing: ot increased sitting balance with dressing in underwear and short sitting eob with no lob to r   And no verbal or physical assistance.  Feet: sba doff/don socks no difficulty with sequencing or lob to r with crossing leg over other to comlplete task    Mobility: Up in room w/ walker and assist of one. Haas score 18/56, gait speed .19 m/s; gait festinating/freezing and wear pattern on shoes supports this is not an acute change; trial w/ U-Walker initially successful. Recommend ongoing IP care at this time.     Cognition/Language:    Community Re-Entry: ww, currently w/ SBA    Transportation: Not a  currently, car transfer not a barrier    Decision maker: self    Plan of Care and goals reviewed and updated.    Discharge Plan/Recommendations    Fall Precautions: continue    Patient/Family input to goals: Yes    Anticipated rehab needs following discharge: Home with home care    Anticipated care giver support after discharge: Family    Estimated length of stay: 3 weeks    Overall plan for the patient: Continue IP Rehabilitation      Utilization Review and Continued Stay Justification    Medical Necessity Criteria:    For any criteria that is not met, please document reason and plan for discharge, transfer, or modification of plan of care to address.    Requires intensive rehabilitation program to treat functional deficits?: Yes    Requires 3x per week or greater involvement of rehabilitation physician to oversee rehabilitation program?: Yes    Requires rehabilitation nursing interventions?: Yes    Patient is making functional progress?: Yes    There is a potential for additional functional progress? Yes    Patient is participating in therapy 3 hours per day a minimum of 5 days per week or 15 hours per week in 7 day period?:Yes    Has discharge needs that require coordinated discharge planning approach?:Yes          Final Physician Sign off    Statement of Approval: I approve the plan of care.     Patient Goals  Social Work Goals: Confirm discharge recommendations with therapy, coordinate safe discharge plan  and remain available to support and assist as needed.       OT Frequency: 10 ndays daily 60-90 min  OT goal: hygiene/grooming: modified independent  OT goal: upper body dressing: Modified independent  OT goal: lower body dressing: Modified independent  OT goal: upper body bathing: Modified independent  OT goal: lower body bathing: Modified independent  OT goal: bed mobility: Modified independent  OT Goal: transfer: Modified independent  OT goal: toilet transfer/toileting: Modified independent  OT goal: meal preparation: Supervision/stand-by assist  OT goal: home management: Supervision/stand-by assist           OT goal 1: pt will be mod I hep for b u/e  OT goal 2: pt will be sba tub/shower transfer           PT Frequency: daily x 90 minutes  PT goal: bed mobility: Independent  PT goal: transfers: Modified independent  PT goal: gait: Modified independent, Greater than 200 feet, Rolling walker  PT goal: stairs: 3 stairs, Modified independent, Rail on both sides  PT goal: perform aerobic activity with stable cardiovascular response: continuous activity, 20 minutes, NuStep     PT goal 1: Pt will demonstrate Ind car transfer for safe community mobility.  PT Goal 2: Pt will be able to complete full flight of stairs with 1 railing at mod I level for access to basement  PT Goal 3: Pt will demonstrate abiltiy to performed HEP safely for continued progress between therapies.  PT Goal 4: fall recovery w/ furniture assist                                                                   Goal: Medical Management: Patient will attend Stroke PLC prior to discharge.                                            Goal: Safety Management: Patient will utilize call light and wait for staff prior to transferring until made Mod I or Ind.           Goal: Falls: Patient will be free from falls while on ARU.

## 2021-11-04 ENCOUNTER — APPOINTMENT (OUTPATIENT)
Dept: OCCUPATIONAL THERAPY | Facility: CLINIC | Age: 76
DRG: 056 | End: 2021-11-04
Attending: PHYSICAL MEDICINE & REHABILITATION
Payer: MEDICARE

## 2021-11-04 ENCOUNTER — APPOINTMENT (OUTPATIENT)
Dept: PHYSICAL THERAPY | Facility: CLINIC | Age: 76
DRG: 056 | End: 2021-11-04
Attending: PHYSICAL MEDICINE & REHABILITATION
Payer: MEDICARE

## 2021-11-04 LAB
ANION GAP SERPL CALCULATED.3IONS-SCNC: 7 MMOL/L (ref 3–14)
BUN SERPL-MCNC: 16 MG/DL (ref 7–30)
CALCIUM SERPL-MCNC: 8.2 MG/DL (ref 8.5–10.1)
CHLORIDE BLD-SCNC: 104 MMOL/L (ref 94–109)
CO2 SERPL-SCNC: 25 MMOL/L (ref 20–32)
CREAT SERPL-MCNC: 0.92 MG/DL (ref 0.66–1.25)
ERYTHROCYTE [DISTWIDTH] IN BLOOD BY AUTOMATED COUNT: 11.8 % (ref 10–15)
GFR SERPL CREATININE-BSD FRML MDRD: 79 ML/MIN/1.73M2
GLUCOSE BLD-MCNC: 127 MG/DL (ref 70–99)
GLUCOSE BLDC GLUCOMTR-MCNC: 104 MG/DL (ref 70–99)
GLUCOSE BLDC GLUCOMTR-MCNC: 106 MG/DL (ref 70–99)
GLUCOSE BLDC GLUCOMTR-MCNC: 179 MG/DL (ref 70–99)
GLUCOSE BLDC GLUCOMTR-MCNC: 183 MG/DL (ref 70–99)
GLUCOSE BLDC GLUCOMTR-MCNC: 229 MG/DL (ref 70–99)
HCT VFR BLD AUTO: 39.1 % (ref 40–53)
HGB BLD-MCNC: 13.6 G/DL (ref 13.3–17.7)
MCH RBC QN AUTO: 30.9 PG (ref 26.5–33)
MCHC RBC AUTO-ENTMCNC: 34.8 G/DL (ref 31.5–36.5)
MCV RBC AUTO: 89 FL (ref 78–100)
PLATELET # BLD AUTO: 150 10E3/UL (ref 150–450)
POTASSIUM BLD-SCNC: 4.4 MMOL/L (ref 3.4–5.3)
RBC # BLD AUTO: 4.4 10E6/UL (ref 4.4–5.9)
SODIUM SERPL-SCNC: 136 MMOL/L (ref 133–144)
WBC # BLD AUTO: 6.4 10E3/UL (ref 4–11)

## 2021-11-04 PROCEDURE — 85014 HEMATOCRIT: CPT | Performed by: PHYSICIAN ASSISTANT

## 2021-11-04 PROCEDURE — 97112 NEUROMUSCULAR REEDUCATION: CPT | Mod: GP | Performed by: PHYSICAL THERAPIST

## 2021-11-04 PROCEDURE — 250N000013 HC RX MED GY IP 250 OP 250 PS 637: Performed by: PHYSICIAN ASSISTANT

## 2021-11-04 PROCEDURE — 97535 SELF CARE MNGMENT TRAINING: CPT | Mod: GO

## 2021-11-04 PROCEDURE — 80048 BASIC METABOLIC PNL TOTAL CA: CPT | Performed by: PHYSICIAN ASSISTANT

## 2021-11-04 PROCEDURE — 128N000003 HC R&B REHAB

## 2021-11-04 PROCEDURE — 97530 THERAPEUTIC ACTIVITIES: CPT | Mod: GP | Performed by: PHYSICAL THERAPIST

## 2021-11-04 PROCEDURE — 97110 THERAPEUTIC EXERCISES: CPT | Mod: GO

## 2021-11-04 PROCEDURE — 36415 COLL VENOUS BLD VENIPUNCTURE: CPT | Performed by: PHYSICIAN ASSISTANT

## 2021-11-04 RX ORDER — POLYETHYLENE GLYCOL 3350 17 G/17G
17 POWDER, FOR SOLUTION ORAL DAILY
Status: DISCONTINUED | OUTPATIENT
Start: 2021-11-04 | End: 2021-11-24 | Stop reason: HOSPADM

## 2021-11-04 RX ORDER — BISACODYL 10 MG
10 SUPPOSITORY, RECTAL RECTAL DAILY PRN
Status: DISCONTINUED | OUTPATIENT
Start: 2021-11-04 | End: 2021-11-24 | Stop reason: HOSPADM

## 2021-11-04 RX ADMIN — METFORMIN HYDROCHLORIDE 1000 MG: 500 TABLET ORAL at 17:26

## 2021-11-04 RX ADMIN — PANTOPRAZOLE SODIUM 40 MG: 40 TABLET, DELAYED RELEASE ORAL at 05:44

## 2021-11-04 RX ADMIN — ATORVASTATIN CALCIUM 40 MG: 40 TABLET, FILM COATED ORAL at 20:00

## 2021-11-04 RX ADMIN — CLOPIDOGREL BISULFATE 75 MG: 75 TABLET, FILM COATED ORAL at 09:08

## 2021-11-04 RX ADMIN — INSULIN ASPART 1 UNITS: 100 INJECTION, SOLUTION INTRAVENOUS; SUBCUTANEOUS at 12:49

## 2021-11-04 RX ADMIN — METFORMIN HYDROCHLORIDE 1000 MG: 500 TABLET ORAL at 09:08

## 2021-11-04 RX ADMIN — ASPIRIN 325 MG ORAL TABLET 325 MG: 325 PILL ORAL at 09:08

## 2021-11-04 RX ADMIN — ENALAPRIL MALEATE 10 MG: 5 TABLET ORAL at 09:08

## 2021-11-04 RX ADMIN — DOCUSATE SODIUM AND SENNOSIDES 2 TABLET: 8.6; 5 TABLET ORAL at 09:17

## 2021-11-04 RX ADMIN — POLYETHYLENE GLYCOL 3350 17 G: 17 POWDER, FOR SOLUTION ORAL at 09:08

## 2021-11-04 ASSESSMENT — ACTIVITIES OF DAILY LIVING (ADL)
ADLS_ACUITY_SCORE: 10
ADLS_ACUITY_SCORE: 13
ADLS_ACUITY_SCORE: 10
ADLS_ACUITY_SCORE: 8
ADLS_ACUITY_SCORE: 10
ADLS_ACUITY_SCORE: 10
ADLS_ACUITY_SCORE: 8
ADLS_ACUITY_SCORE: 10
ADLS_ACUITY_SCORE: 10

## 2021-11-04 NOTE — PROGRESS NOTES
"  [facility]   Acute Rehabilitation Unit  Daily progress note    INTERVAL HISTORY  Jose Mallory was seen at bedside this morning.  No acute events reported overnight, other than patient was upset to be awakened at 2am for blood glucose check.  Despite that, he states that he woke feeling well-rested, although he is generally feeling more fatigued.  He has not had a bowel movement since loose stools ~3 days ago.  He denies any nausea or abdominal pain.  States he is passing large amounts of flatus.  He notes ongoing poor appetite.  He is agreeable to oral bowel meds, prune juice today, will try suppository this evening if no BM by then.  He is trying to keep up with hydration.  He notes some dizziness while walking stairs with therapy.  He states that he found the news of extending his length of stay to be \"devastating\" yesterday.  But he notes some improved acceptance with additional processing time.  He is struggling being away from family, especially with his wife's concurrent illness.  He is open to meeting with psychology for additional support.    Functionally, tried ambulation with U-step walker, visual cue is successful for him with gait pattern.  Also worked on Vycon, able to complete forced pace intervals without difficulty.    MEDICATIONS    aspirin  325 mg Oral Daily     atorvastatin  40 mg Oral QPM     bisacodyl  10 mg Rectal Once     clopidogrel  75 mg Oral Daily     enalapril  10 mg Oral Daily     [Held by provider] glipiZIDE  2.5 mg Oral QAM AC     influenza vac high-dose quad  0.7 mL Intramuscular Prior to discharge     insulin aspart  1-7 Units Subcutaneous TID AC     insulin aspart  1-5 Units Subcutaneous At Bedtime     metFORMIN  1,000 mg Oral BID w/meals     pantoprazole  40 mg Oral QAM AC     polyethylene glycol  17 g Oral Daily        acetaminophen, glucose **OR** dextrose **OR** glucagon, melatonin, senna-docusate     PHYSICAL EXAM  BP (!) 145/73 (BP " "Location: Left arm)   Pulse 93   Temp 97.6  F (36.4  C) (Oral)   Resp 16   Ht 1.778 m (5' 10\")   Wt 84.1 kg (185 lb 6.4 oz)   SpO2 96%   BMI 26.60 kg/m     Gen: NAD, pleasant, lying in bed  HEENT: NC/AT  Cardio: RRR, no murmurs  Pulm: non-labored on room air, lungs CTA bilaterally  Abd: soft, non-tender, non-distended, bowel sounds present  Ext: no edema bilaterally  Neuro/MSK: awake, alert, speech clear/fluent, follows basic commands, responds appropriately    LABS  CBC RESULTS:   Recent Labs   Lab Test 11/04/21  0646 11/01/21  0752 10/28/21  0654   WBC 6.4 9.0 6.8   RBC 4.40 4.74 4.78   HGB 13.6 14.4 14.6   HCT 39.1* 42.3 43.1   MCV 89 89 90   MCH 30.9 30.4 30.5   MCHC 34.8 34.0 33.9   RDW 11.8 11.9 12.0    169 153     Last Basic Metabolic Panel:  Recent Labs   Lab Test 11/04/21  0907 11/04/21  0646 11/04/21  0148 11/03/21  0732 11/03/21  0634 11/01/21  1239 11/01/21  0752   NA  --  136  --   --  139  --  137   POTASSIUM  --  4.4  --   --  4.1  --  4.1   CHLORIDE  --  104  --   --  106  --  105   CO2  --  25  --   --  29  --  27   ANIONGAP  --  7  --   --  4  --  5   * 127* 104*   < > 128*   < > 134*   BUN  --  16  --   --  16  --  18   CR  --  0.92  --   --  1.13  --  1.03   GFRESTIMATED  --  79  --   --  62  --  69   LEON  --  8.2*  --   --  8.3*  --  8.6    < > = values in this interval not displayed.       Rehabilitation - continue comprehensive acute inpatient rehabilitation program with multidisciplinary approach including therapies, rehab nursing, and physiatry following. See interval history for updates.      ASSESSMENT AND PLAN  Jose Mallory is a 76 year old right hand dominant male with a past medical history of DM2, HTN, HLD, GERD, posterior fossa arachnoid cyst, peripheral neuropathy, prior R pontine stroke, gait instability, and bilateral knee replacement who was admitted on 10/26/21 with progressive lower extremity weakness and gait difficulties, found to have subacute left " pontine stroke with course complicated by neuropathy.  He is now admitted to ARU on 10/29 for multidisciplinary rehabilitation and ongoing medical management.     #Subacute L pontine stroke  #Subacute to chronic R pontine stroke  #Gait instability  #Arachnoid cyst  #Moderate cervical canal stenosis with disc herniation most pronounced at C3-C4, C4-C5   MRI with subacute cerebral infarction in left ventral cookie.  TTE EF 60-65%, no significant valvular disease or cardiac source for embolus.  Telemetry with NSR and no evidence of atrial fibrillation.  IV tPA was not initiated due to unclear or unfavorable risk-benefit profile for extended window thrombolysis beyond the conventional 4.5 hour time window.  Etiology of stroke thought to be atherosclerotic. Gait instability suspected to be multifactorial including peripheral neuropathy, cerebellar disease, and recent bilateral pontine strokes.  - Continue DAPT with Aspirin 325 mg daily and Plavix 75 mg daily x90 days, then stop Plavix and continue Aspirin 325 mg daily only  - High intensity statin (atorvastatin 40 mg daily) to target LDL <70  - HbA1c at goal (6.3%)  - Long-term BP goal to 120/80  - PT noting festinating gait, very slow gait, intermittent freezing, cogwheeling, difficulty initiating motor tasks.  Consider neurology consult while remains at ARU to evaluate and potentially trial medication.  - Continue PT/OT  - Outpatient sleep evaluation for SELIN  - Follow up with neurology in 4-6 weeks  - Per hospital discharge summary, if gait has not improved after the pt's stay at acute rehab and/or by the time there is neurology follow up in the outpatient setting, please consider additional neurosurgery consultation.     #Peripheral neuropathy  #Decreased vibration sensation bilaterally in lower extremities  Per neurology, suspect that large fiber sensory dysfunction secondary to diabetes.  Vitamin B12/folate WNL, NEHA negative, HIV non reactive, RPR negative.  Copper,  zinc WNL.  Protein electrophoresis with slightly elevated alpha 2, per pathologist, essentially normal, no obvious monoclonal proteins.  - Pending work-up: Homocysteine, MMA  - Follow up with neurology for EMG     #HTN  [PTA enalapril 5mg PO daily]  - BP generally elevated above goal.  Enalapril increased to 10 mg daily on 11/2 with improvement.  - Continue to monitor BP, adjust meds as indicated     #DMII  [PTA meds: metformin 1000 mg BID, glipizide 10 mg daily]  A1c 6.3%.  Patient reports compliance with medications, but that he was not taking his blood glucose at home as directed.    - Monitor blood glucose TID AC and HS.  BG currently 103-200  - Continue PTA metformin 1g BID  - PTA glipizide 10 mg held throughout inpatient admission.  BG slightly elevated but also with variable intake.  Resumed at low dose 2.5 mg daily 11/2 but with mild hypoglycemia (66), now on hold until intake improved.  - Hypoglycemia protocol  - Will need new glucometer and supplies, diabetes educator consulted    Cr uptrending  0.86 -> 1.03 -> 1.13 on 11/3.  Poor oral fluid intake, also recently increased dose of enalapril. Patient agreeable to increasing oral fluids.    - Encourage fluids  - Repeat BMP today with significant improvement Cr 0.92      1. Adjustment to disability:  Endorsing some PTA depression and having difficulty coping with current situation, especially today with prospect of needing longer ARU stay than initially anticipated.  Agreeable to psychology consult.  2. FEN: regular diet, thin liquids  3. Bowel: continent, no BM for several days on admission, then with episode of emesis 10/31 and loose stools x2, now with recurrent constipation.   Receiving Miralax and Senokot-S.  Refused suppository 11/3.  Add prune juice at lunch.  If no BM by this evening, trial suppository.  4. Bladder: continent, PVRs at admission x3 <100, ok to monitor PRN only  5. DVT Prophylaxis: mechanical  6. GI Prophylaxis: PPI given DAPT +  age  7. Code: full, confirmed on admission  8. Disposition: goal for home  9. ELOS: 7 days  10. Follow up Appointments on Discharge: PCP, stroke and general neurology        Patient discussed with Dr. Fredis Ordonez, PM&R Staff Physician    Bozena Bower PA-C  Physical Medicine & Rehabilitation

## 2021-11-04 NOTE — PLAN OF CARE
FOCUS/GOAL  Bowel management, Bladder management, Medication management, Pain management, Medical management, Mobility, Skin integrity, and Safety management     ASSESSMENT, INTERVENTIONS AND CONTINUING PLAN FOR GOAL:        Pt is alert and oriented x 4. Continent of bowel and bladder. Pt continues to experience constipation. Encouraged pt to drink more fluids, senna and miralax given. Discussed with pt that if he does not have a bm today he may need a suppository, pt verbalized understanding. Pt stated he feels down due to changes in his stay at [email or web address]] and he feels lonely as his family is out of state. Pt accepted offer of a psychology consult, order was placed. Ax1 transfers. Pt stated that he keeps leaning to the left side when he sits up to eat. Pt wanted to tie himself to the bedside table with his gait belt so he does not lean when he sits up. Educated pt on the danger of such activity and assured that nursing will help him sit upright with pillows during mealtime. Reg diet, thin liquids. Denied pain, nausea and vomiting, numbness or tingling, SOB. Alarms on, call light within reach. Will continue with plan of care.

## 2021-11-04 NOTE — PLAN OF CARE
"Discharge Planner Post-Acute Rehab PT:      Discharge Plan: Home with OP PT with discharge date anticipated 11/8     Precautions: Fall precaution     Current Status:  Bed Mobility: SBA, but inconsistent with fatigue levels  Transfer: CGA without device to complete stand pivot transfer  Gait: AMb >250 with unit(s) step walker and SBA.   Stairs: 8 6\" steps with B railings and reciprocal pattern needing increased UE support for L step;  limited due to dizziness  Balance:   PASS:  22/36  Haas 18/56  Gait Speed: .19 m/s     Assessment:  Demonstrating improvements w/ gait w/ visual cues, raised handles on walker which improved upright posture and less wt on UE's   Other Barriers to Discharge (DME, Family Training, etc): None  "

## 2021-11-04 NOTE — PLAN OF CARE
A/Ox4, able to make needs known, uses call light appropriately. Contintent of bowel and bladder. LBM reported 11/1, see AM medication interventions. Declined supp requesting to be taken tomorrow morning. Denies pain. A1 using ww and gait belt. Regular diet/thin liquids with good appetite. QID BG checks, 106 and 179. Skin intact. No acute concerns, call light within reach, bed alarms activated, continue POC.

## 2021-11-04 NOTE — PLAN OF CARE
FOCUS/GOAL  Bowel management, Bladder management, Medication management, Medical management, Mobility, Skin integrity, and Cognition/Memory/Judgment/Problem solving    ASSESSMENT, INTERVENTIONS AND CONTINUING PLAN FOR GOAL:  Pt A&Ox4. A1 with walker. Continent of B&B. Uses urinal overnight. Output of 225mL of urine on shift. LBM 11/1. Pt became upset at 2 am for being woken up for BS check. Encouraged pt to drink more water but declined. Pt denies pain, SOB, dizziness, headache, and new numbness and tingling.

## 2021-11-04 NOTE — PLAN OF CARE
FOCUS/GOAL  Medical management    ASSESSMENT, INTERVENTIONS AND CONTINUING PLAN FOR GOAL:  Pt was up in the chair for supper and ate about 75%, fluids encouraged.... output noted very minimal 100 ml via urinal. Denies any pain/discomfort. VSS,  & 200... coverage given per order. Continent of b/b.... last bm reported 11/01/2021. Refused a one time ordered bisacodyl suppository... instead requested and was given prn senna-docusate 1 tablet... bm results pending.

## 2021-11-04 NOTE — CONSULTS
Health Psychology                  Clinic    Department of Medicine  Melony Polk, Ph.D., L.P. [phone]                          [facility]  [facility] Kristi Loja, Ph.D.,  L.P. [phone]                 3rd Floor  [place] Mail [address]   Mary Mcghee, Ph.D., L.P. [phone]    [address] Lora Kwan, Ph.D., L.P. [phone]            [place] [address]          Grayson Bledsoe, Ph.D., A.B.P.P., L.P. [phone]      Nanette Fischer, Ph.D., L.P. [phone]      Inpatient Health Psychology Consultation*    Short conversation to introduce myself to Mr Shawnee who had expressed some emotional distress while also expressing uncertainty about meeting with Health Psychology. He was easy to engage, agreed that he will consider the possibility that meeting for longer could potentially be helpful to him. Assured him that we can schedule a time so that he will know when I will meet with him on Friday, and also that he can choose at that time not to meet if he prefers.    Melony Polk, PhD, LP      *In accordance with the Rules of the [place] [employer], it is noted that psychological descriptions and scientific procedures underlying psychological evaluations have limitations.  Absolute predictions cannot be made based on information in this report.

## 2021-11-04 NOTE — PLAN OF CARE
Discharge Planner Post-Acute Rehab OT:     Discharge Plan: home with wife    Precautions: falls    Current Status:  ADLs:   G/H - standing/sitting EOS with 4WW SBA with set up  U/b - seated SBA with set up  L/B dressing - seated EOB with set up and stand don over hips close SBA  Feet: seated, doff/don socks using cross leg tech  Shower: ot sba min cues for sequencing slower processing of shower task pt able to wash all dougie  inclusing crossing leg over other to gt lowerr leg and feet and standing with cga  with rail for pericares from front and back    IADLs: tba    Vision/Cognition: slums 20/30    Assessment: Pt participated well in AM and PM sessions, tolerating 6 minute stand during ADLs and in-room mobility and seated exercise.  Cont per POC  Other Barriers to Discharge (DME, Family Training, etc): daughter in law PT pt has rolling walker and higher toilets at home

## 2021-11-05 ENCOUNTER — APPOINTMENT (OUTPATIENT)
Dept: OCCUPATIONAL THERAPY | Facility: CLINIC | Age: 76
DRG: 056 | End: 2021-11-05
Attending: PHYSICAL MEDICINE & REHABILITATION
Payer: MEDICARE

## 2021-11-05 ENCOUNTER — APPOINTMENT (OUTPATIENT)
Dept: PHYSICAL THERAPY | Facility: CLINIC | Age: 76
DRG: 056 | End: 2021-11-05
Attending: PHYSICAL MEDICINE & REHABILITATION
Payer: MEDICARE

## 2021-11-05 ENCOUNTER — APPOINTMENT (OUTPATIENT)
Dept: GENERAL RADIOLOGY | Facility: CLINIC | Age: 76
DRG: 056 | End: 2021-11-05
Attending: PHYSICIAN ASSISTANT
Payer: MEDICARE

## 2021-11-05 LAB
GLUCOSE BLDC GLUCOMTR-MCNC: 121 MG/DL (ref 70–99)
GLUCOSE BLDC GLUCOMTR-MCNC: 135 MG/DL (ref 70–99)
GLUCOSE BLDC GLUCOMTR-MCNC: 163 MG/DL (ref 70–99)
GLUCOSE BLDC GLUCOMTR-MCNC: 174 MG/DL (ref 70–99)
SARS-COV-2 RNA RESP QL NAA+PROBE: NEGATIVE

## 2021-11-05 PROCEDURE — 74018 RADEX ABDOMEN 1 VIEW: CPT | Mod: 26 | Performed by: RADIOLOGY

## 2021-11-05 PROCEDURE — 97129 THER IVNTJ 1ST 15 MIN: CPT | Mod: GO

## 2021-11-05 PROCEDURE — 97530 THERAPEUTIC ACTIVITIES: CPT | Mod: GO

## 2021-11-05 PROCEDURE — 128N000003 HC R&B REHAB

## 2021-11-05 PROCEDURE — 99233 SBSQ HOSP IP/OBS HIGH 50: CPT | Performed by: PHYSICAL MEDICINE & REHABILITATION

## 2021-11-05 PROCEDURE — 87635 SARS-COV-2 COVID-19 AMP PRB: CPT | Performed by: PHYSICIAN ASSISTANT

## 2021-11-05 PROCEDURE — 97112 NEUROMUSCULAR REEDUCATION: CPT | Mod: GP | Performed by: PHYSICAL THERAPIST

## 2021-11-05 PROCEDURE — 250N000013 HC RX MED GY IP 250 OP 250 PS 637: Performed by: PHYSICIAN ASSISTANT

## 2021-11-05 PROCEDURE — 97530 THERAPEUTIC ACTIVITIES: CPT | Mod: GP | Performed by: PHYSICAL THERAPIST

## 2021-11-05 PROCEDURE — 90791 PSYCH DIAGNOSTIC EVALUATION: CPT | Performed by: PSYCHOLOGIST

## 2021-11-05 PROCEDURE — 74018 RADEX ABDOMEN 1 VIEW: CPT

## 2021-11-05 PROCEDURE — 97116 GAIT TRAINING THERAPY: CPT | Mod: GP | Performed by: PHYSICAL THERAPIST

## 2021-11-05 PROCEDURE — 97110 THERAPEUTIC EXERCISES: CPT | Mod: GP | Performed by: PHYSICAL THERAPIST

## 2021-11-05 PROCEDURE — 97535 SELF CARE MNGMENT TRAINING: CPT | Mod: GO

## 2021-11-05 RX ORDER — AMOXICILLIN 250 MG
1 CAPSULE ORAL 2 TIMES DAILY
Status: DISCONTINUED | OUTPATIENT
Start: 2021-11-05 | End: 2021-11-24 | Stop reason: HOSPADM

## 2021-11-05 RX ORDER — MIRTAZAPINE 7.5 MG/1
7.5 TABLET, FILM COATED ORAL AT BEDTIME
Status: DISCONTINUED | OUTPATIENT
Start: 2021-11-05 | End: 2021-11-09

## 2021-11-05 RX ADMIN — ATORVASTATIN CALCIUM 40 MG: 40 TABLET, FILM COATED ORAL at 19:58

## 2021-11-05 RX ADMIN — POLYETHYLENE GLYCOL 3350 17 G: 17 POWDER, FOR SOLUTION ORAL at 08:20

## 2021-11-05 RX ADMIN — MIRTAZAPINE 7.5 MG: 7.5 TABLET, FILM COATED ORAL at 21:41

## 2021-11-05 RX ADMIN — ENALAPRIL MALEATE 10 MG: 5 TABLET ORAL at 08:20

## 2021-11-05 RX ADMIN — METFORMIN HYDROCHLORIDE 1000 MG: 500 TABLET ORAL at 17:08

## 2021-11-05 RX ADMIN — ASPIRIN 325 MG ORAL TABLET 325 MG: 325 PILL ORAL at 08:20

## 2021-11-05 RX ADMIN — METFORMIN HYDROCHLORIDE 1000 MG: 500 TABLET ORAL at 08:20

## 2021-11-05 RX ADMIN — SENNOSIDES AND DOCUSATE SODIUM 1 TABLET: 8.6; 5 TABLET ORAL at 11:24

## 2021-11-05 RX ADMIN — PANTOPRAZOLE SODIUM 40 MG: 40 TABLET, DELAYED RELEASE ORAL at 06:49

## 2021-11-05 RX ADMIN — INSULIN ASPART 1 UNITS: 100 INJECTION, SOLUTION INTRAVENOUS; SUBCUTANEOUS at 17:14

## 2021-11-05 RX ADMIN — SENNOSIDES AND DOCUSATE SODIUM 1 TABLET: 8.6; 5 TABLET ORAL at 19:57

## 2021-11-05 RX ADMIN — CLOPIDOGREL BISULFATE 75 MG: 75 TABLET, FILM COATED ORAL at 08:20

## 2021-11-05 ASSESSMENT — ACTIVITIES OF DAILY LIVING (ADL)
ADLS_ACUITY_SCORE: 12
ADLS_ACUITY_SCORE: 12
ADLS_ACUITY_SCORE: 13
ADLS_ACUITY_SCORE: 13
ADLS_ACUITY_SCORE: 12
ADLS_ACUITY_SCORE: 13
ADLS_ACUITY_SCORE: 12
ADLS_ACUITY_SCORE: 13
ADLS_ACUITY_SCORE: 13
ADLS_ACUITY_SCORE: 12

## 2021-11-05 NOTE — PROGRESS NOTES
"  [facility]   Acute Rehabilitation Unit  Daily progress note    INTERVAL HISTORY  Jose Mallory was seen at bedside this morning.  No acute events reported overnight, although he notes that he did not sleep well.  He states this was in large part due to racing thoughts, worries.  He will be meeting with psychology today.  He has not had a bowel movement for 4 days, and states he is beginning to feel \"full\", somewhat uncomfortable.  He denies nausea, vomiting.  States he is still passing gas.  He has refused suppository x2 nights and is taking oral bowel meds and prune juice.  He is agreeable to abdominal xray today to further assess and suppository today if no BM.  He denies other concerns at this time, no shortness of breath, chest pain, dizziness/lightheadedness, bladder concerns.    Functionally, he is currently needing standby assist for bed mobility, contact guard assist for stand pivot transfers without device, ambulating >250' with Ustep walker and standby assist, ambulated 8-6\" steps with bilateral rails, limited due to dizziness.  He is able to complete grooming/hygiene with standby assist, dressing with standby assist.    MEDICATIONS    aspirin  325 mg Oral Daily     atorvastatin  40 mg Oral QPM     clopidogrel  75 mg Oral Daily     enalapril  10 mg Oral Daily     [Held by provider] glipiZIDE  2.5 mg Oral QAM AC     influenza vac high-dose quad  0.7 mL Intramuscular Prior to discharge     insulin aspart  1-7 Units Subcutaneous TID AC     insulin aspart  1-5 Units Subcutaneous At Bedtime     metFORMIN  1,000 mg Oral BID w/meals     pantoprazole  40 mg Oral QAM AC     polyethylene glycol  17 g Oral Daily        acetaminophen, bisacodyl, glucose **OR** dextrose **OR** glucagon, melatonin, senna-docusate     PHYSICAL EXAM  BP (!) 175/86 (BP Location: Left arm)   Pulse 90   Temp 97.5  F (36.4  C) (Oral)   Resp 20   Ht 1.778 m (5' 10\")   Wt 84.1 kg (185 lb 6.4 oz)   " SpO2 96%   BMI 26.60 kg/m     Gen: NAD, pleasant but appears down, lying in bed  HEENT: NC/AT  Cardio: RRR, no murmurs  Pulm: non-labored on room air, lungs CTA bilaterally  Abd: soft, non-tender, non-distended, bowel sounds present  Ext: no edema bilaterally  Neuro/MSK: awake, alert, speech clear/fluent, follows basic commands, responds appropriately    LABS  CBC RESULTS:   Recent Labs   Lab Test 11/04/21  0646 11/01/21  0752 10/28/21  0654   WBC 6.4 9.0 6.8   RBC 4.40 4.74 4.78   HGB 13.6 14.4 14.6   HCT 39.1* 42.3 43.1   MCV 89 89 90   MCH 30.9 30.4 30.5   MCHC 34.8 34.0 33.9   RDW 11.8 11.9 12.0    169 153     Last Basic Metabolic Panel:  Recent Labs   Lab Test 11/05/21  0723 11/04/21  2146 11/04/21  1729 11/04/21  0907 11/04/21  0646 11/03/21  0732 11/03/21  0634 11/01/21  1239 11/01/21  0752   NA  --   --   --   --  136  --  139  --  137   POTASSIUM  --   --   --   --  4.4  --  4.1  --  4.1   CHLORIDE  --   --   --   --  104  --  106  --  105   CO2  --   --   --   --  25  --  29  --  27   ANIONGAP  --   --   --   --  7  --  4  --  5   * 179* 106*   < > 127*   < > 128*   < > 134*   BUN  --   --   --   --  16  --  16  --  18   CR  --   --   --   --  0.92  --  1.13  --  1.03   GFRESTIMATED  --   --   --   --  79  --  62  --  69   LEON  --   --   --   --  8.2*  --  8.3*  --  8.6    < > = values in this interval not displayed.       Rehabilitation - continue comprehensive acute inpatient rehabilitation program with multidisciplinary approach including therapies, rehab nursing, and physiatry following. See interval history for updates.      ASSESSMENT AND PLAN  Jose Mallory is a 76 year old right hand dominant male with a past medical history of DM2, HTN, HLD, GERD, posterior fossa arachnoid cyst, peripheral neuropathy, prior R pontine stroke, gait instability, and bilateral knee replacement who was admitted on 10/26/21 with progressive lower extremity weakness and gait difficulties, found to have  subacute left pontine stroke with course complicated by neuropathy.  He is now admitted to ARU on 10/29 for multidisciplinary rehabilitation and ongoing medical management.     #Subacute L pontine stroke  #Subacute to chronic R pontine stroke  #Gait instability  #Arachnoid cyst  #Moderate cervical canal stenosis with disc herniation most pronounced at C3-C4, C4-C5   MRI with subacute cerebral infarction in left ventral cookie.  TTE EF 60-65%, no significant valvular disease or cardiac source for embolus.  Telemetry with NSR and no evidence of atrial fibrillation.  IV tPA was not initiated due to unclear or unfavorable risk-benefit profile for extended window thrombolysis beyond the conventional 4.5 hour time window.  Etiology of stroke thought to be atherosclerotic. Gait instability suspected to be multifactorial including peripheral neuropathy, cerebellar disease, and recent bilateral pontine strokes.  - Continue DAPT with Aspirin 325 mg daily and Plavix 75 mg daily x90 days, then stop Plavix and continue Aspirin 325 mg daily only  - High intensity statin (atorvastatin 40 mg daily) to target LDL <70  - HbA1c at goal (6.3%)  - Long-term BP goal to 120/80  - PT noting festinating gait, very slow gait, intermittent freezing, cogwheeling, difficulty initiating motor tasks.  Consider neurology consult while remains at ARU to evaluate and potentially trial medication.  - Continue PT/OT  - Outpatient sleep evaluation for SELIN  - Follow up with neurology in 4-6 weeks  - Per hospital discharge summary, if gait has not improved after the pt's stay at acute rehab and/or by the time there is neurology follow up in the outpatient setting, please consider additional neurosurgery consultation.     #Peripheral neuropathy  #Decreased vibration sensation bilaterally in lower extremities  Per neurology, suspect that large fiber sensory dysfunction secondary to diabetes.  Vitamin B12/folate WNL, NEHA negative, HIV non reactive, RPR  negative.  Copper, zinc WNL.  Protein electrophoresis with slightly elevated alpha 2, per pathologist, essentially normal, no obvious monoclonal proteins.  - Pending work-up: Homocysteine, MMA  - Follow up with neurology for EMG     #HTN  [PTA enalapril 5mg PO daily]  - BP generally elevated above goal.  Enalapril increased to 10 mg daily on 11/2 with improvement.  - Continue to monitor BP, adjust meds as indicated     #DMII  [PTA meds: metformin 1000 mg BID, glipizide 10 mg daily]  A1c 6.3%.  Patient reports compliance with medications, but that he was not taking his blood glucose at home as directed.    - Monitor blood glucose TID AC and HS.  BG currently 106-229  - Continue PTA metformin 1g BID  - PTA glipizide 10 mg held throughout inpatient admission.  BG slightly elevated but also with variable intake.  Resumed at low dose 2.5 mg daily 11/2 but with mild hypoglycemia (66), now on hold until intake improved.  - Hypoglycemia protocol  - Will need new glucometer and supplies, diabetes educator consulted    Cr uptrending  0.86 -> 1.03 -> 1.13 on 11/3.  Poor oral fluid intake, also recently increased dose of enalapril. Patient agreeable to increasing oral fluids.    - Encourage fluids  - Repeat BMP 11/4 with significant improvement Cr 0.92.  Repeat labs Monday    Constipation  No BM for several days on admission, then with episode of emesis 10/31 and loose stools x2, now with recurrent constipation.   Receiving Miralax and Senokot-S.  Refused suppository 11/3.  Added prune juice.  Has refused suppository x2 days.  Feeling distended/full.  No recurrent nausea/vomiting.  Bowel sounds present, passing gas.  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  He notes decreased interest in food over the past year or so.  He has been sleeping poorly this admission.  Struggling with being away from family as well.  - Agreeable to psych consult, appreciate assistance  - Recommend trial of Remeron for mood, appetite, sleep.      1. Adjustment to disability:  Endorsing some PTA depression and having difficulty coping with current situation, especially today with prospect of needing longer ARU stay than initially anticipated.  Agreeable to psychology consult.  2. FEN: regular diet, thin liquids  3. Bowel: continent, constipation as above, AXR today and suppository or enema pending results  4. Bladder: continent, PVRs at admission x3 <100, ok to monitor PRN only  5. DVT Prophylaxis: mechanical  6. GI Prophylaxis: PPI given DAPT + age  7. Code: full, confirmed on admission  8. Disposition: goal for home  9. ELOS: 7 days  10. Follow up Appointments on Discharge: PCP, stroke and general neurology        Patient discussed with Dr. Fredis Ordonez, PM&R Staff Physician    PARMJIT RileyC  Physical Medicine & Rehabilitation

## 2021-11-05 NOTE — PLAN OF CARE
"Discharge Planner Post-Acute Rehab PT:      Discharge Plan: Home with OP PT with discharge date anticipated 11/19     Precautions: Fall precaution     Current Status:  Bed Mobility: Ind  Transfer: CGA without device to complete stand pivot transfer  Gait: AMb >250 with U step walker and SBA. Intermittent freezing/festinating.  Stairs: 8 6\" steps with B railings and reciprocal pattern needing increased UE support for L step;  limited due to dizziness  Balance:   PASS:  22/36  Haas 18/56  Gait Speed: .19 m/s     Assessment:  Demonstrating improvements w/ gait w/ visual cues, raised handles on walker which improved upright posture and less wt on UE's   Other Barriers to Discharge (DME, Family Training, etc): None  "

## 2021-11-05 NOTE — PLAN OF CARE
FOCUS/GOAL  Medical management    ASSESSMENT, INTERVENTIONS AND CONTINUING PLAN FOR GOAL:   this morning, patient admitted being upset about a family situation which disturbed his sleep last night.  Active listening applied. He is also concerned about his bowl situation. Senna given per order. BP stabilized at recheck.  Abdomen Xray done  this morning. Result seen by provider. No new order received. Asymptomatic  covid test to be completed, not done this shift, patient was in therapy. PM Nurse informed.Denied pain. Will continue with POC.

## 2021-11-05 NOTE — CONSULTS
Health Psychology                  Clinic    Department of Medicine  Melony Polk, Ph.D., L.P. [phone]                          Clinics and Surgery Center  [facility] Kristi Loja, Ph.D.,  L.P. [phone]                 3rd Floor  [place] [address]   Mary Mcghee, Ph.D., L.P. [phone]    [address] Lora Kwan, Ph.D., L.P. [phone]            [place] [address]          Grayson Bledsoe, Ph.D., A.MACEY.LATHA., L.P. [phone]      Nanette Fischer, Ph.D., L.P. [phone]      Inpatient Health Psychology Consultation*    DOS: 11/05/2021    REFERRAL SOURCE:  Bozena Bower PA-C, Acute Rehabilitation Center, [facility].    REASON FOR REFERRAL:  Justino Mallory is a 76-year-old man currently on the Acute Rehabilitation Center at [facility].  He was initially admitted to the Neuroscience service at [facility] on 10/29/2021 with a background of a 2-year history of gait instability that has significantly worsened over the previous week; he was identified on MRI as having a subacute infarct of the left cookie.  He was also found to have significant cervical spine degeneration as well as spinal stenosis and diabetic neuropathy.  Mr. Mallory has exhibited some emotional distress over his time on the [facility].  This evaluation was requested to assess his current emotional status and to make treatment recommendations.    HISTORY OF PRESENT ILLNESS:  Mr. Mallory was initially unsure if he would be comfortable meeting with Health Psychology for additional support.  We met briefly 2 days ago and I introduced Health Psychology services, the ways in which other patients in similar situations have found it helpful, and suggested that he reflect on whether he thought it would be helpful with the plan to potentially meet today.    When I arrived today, Mr. Mallory was very gracious and open to  "engaging in conversation.  He was alert and oriented.  He reported his mood as upset today, primarily because of a very difficult interaction with his sister-in-law on the telephone last evening; he describes her focusing with him on her negative perception of his wife's medical condition and additional feedback to him that felt very hurtful.  This left him distressed and angry with his sister-in-law as well as anxious and worried about his wife, who is in a hospital in [place], [place], with pleurisy and now apparently also pneumonia.  Mr. Mallory tells me that he was unable to sleep effectively for the rest of the night and only dozed on and off.    Mr. Mallory volunteers information about his personal history, telling me that he has been through some \"dark times.\"  He describes having gone through the deaths of his brother, his only sibling, and his mother at almost the same time approximately 30 years ago.  There are more details of those deaths that made the circumstances even more difficult, and Mr. Mallory was very articulate in describing his initial reactions of anger and bitterness toward the individual who killed his brother, that he has now processed through his kaylee to find a place of forgiveness.  He spoke very articulately about the importance of being able to get to a place of forgiveness and the negative impact on himself potentially if he had not been able to achieve that.  He describes that although he went through a significant period of sadness and grieving at that time, he does not feel that this transformed into significant depression, as he never felt a sense of hopelessness or any difficulty with motivation for his typical activities and relationships. He denies any premorbid difficulty with anxiety that affected his functioning. He typically initiates sleep easily but can be easily awakened.     Mr. Mallory describes some surprise at finding that he had experienced a stroke " and comments that the physical consequences of his stroke seemed to be much different than that of a friend who had a very severe stroke and was left quite impaired.  Mr. Mallory is aware of the other medical issues that have been more chronic, although we did not discuss these in any detail today.    Mr. Mallory acknowledges that when he was informed this week that his progress would not allow him to discharge as early as he had originally hoped and anticipated, that he is now focused on working as hard as he can to make the progress that he needs.  He states that initially he was quite disappointed and feeling down when informed of that change, but has become more accepting of the reasons and their importance, and is working hard to make the gains he needs.  He does continue to report a significantly decreased appetite, and finds it difficult to focus on increasing his oral intake as much as he is informed would be desirable.  He also expresses some anxiety about his bowel function and how long it has been since his last bowel movement.    Mr. Mallory was extremely positive in his feedback about the benefits for him of having the opportunity to use Health Psychology as a sounding board, so that his thoughts and feelings do not feel as much of a burden on him.  He requested more than once that I return for additional visits for support during this [facility] admission.    SOCIAL HISTORY:  Mr. Mall[place] in [place] he currently [place].  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  Mr. Mallory has 3 adult sons with his wife of 55 years, Earline.  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  Mr. Mallory has a deep kaylee, and this provides him with an anchor in his life from which he gains comfort and strength.  He describes that it was through his kaylee that he was  able to get to a place of forgiveness regarding the death of his brother.    MEDICAL HISTORY:  Mr. Mallory's electronic medical record indicates history of type 2 diabetes with neuropathy, hypertension, GERD, previous right pontine stroke, a past posterior fossa cyst, and bilateral knee replacements.  Please see his complete [facility] electronic medical record for more complete information on his medical history, current admission and status, and all medications.    PSYCHIATRIC HISTORY:  As above in HPI.  Mr. Mallory has never met previously with a mental health professional.  He denies any significant history of depression or anxiety that has significantly affected his functioning.  No other significant psychiatric history was available at the time of this evaluation.    BEHAVIORAL OBSERVATIONS:  Mr. Mallory presented for this evaluation sitting up in his room on the [facility] between scheduled rehabilitation therapies.  He was aware of our scheduled time and was looking forward to the contact.  He reported his mood as distressed and irritated because of the phone call with his sister-in-law last night.  He exhibited a completely euthymic affect.  Speech was clear, coherent and goal oriented.  Content of speech and use of vocabulary indicated the likelihood of a baseline level of cognitive functioning that is above average.  This is interesting in context of her recent score on the SLUMS evaluation of 20/30.  Insight and judgment appear to be good.  He exhibited no evidence of distortions of thought or perception.    IMPRESSION AND PLAN:  Justino Mallory is a 76-year-old man, currently on the Acute Rehabilitation Center following gradual increase in gait difficulties that has been present for several years, but rapidly worsened over the week or 2 prior to his hospitalization on 10/26/2021.  Mr. Ocasio has been experiencing some emotional reactions that appear to fit a diagnosis of adjustment disorder.  He  finds the opportunity to have emotional support through conversation and the ability to process his thoughts and feelings externally helpful.  I will plan to meet with him next week on the [facility].    DIAGNOSIS:  Adjustment disorder with mixed depression and anxiety (F43.23).    Melony Polk, PhD, LP      *In accordance with the Rules of the [place] Board of [employer], it is noted that psychological descriptions and scientific procedures underlying psychological evaluations have limitations.  Absolute predictions cannot be made based on information in this report.      D: 2021   T: 2021   MT: MAGGY    Name:     JOSÉ LUIS CHRISTIANSONN BISICash  MRN:      [id number]        Account:      [id number]   :      1945           Consult Date: 2021     Document: [id number]

## 2021-11-05 NOTE — PLAN OF CARE
A/Ox4, able to make needs known, uses call light appropriately. Contintent of bowel and bladder. LBM 11/5. Denies pain. A1 using ww and gait belt. Regular diet/thin liquids with good appetite. QID BG checks, 163 and 174. Skin intact. No acute concerns, call light within reach, bed alarms activated, continue POC.

## 2021-11-05 NOTE — PLAN OF CARE
FOCUS/GOAL  Medical management    ASSESSMENT, INTERVENTIONS AND CONTINUING PLAN FOR GOAL:  Pt is alert. He complained that he did not get enough sleep d/t voices heard on the [address]. Closed his room's door and minimize nose during safety round checks. Denies pain and no sob noted. Independent w/ bed mobility. Warm blanket provided. He used urinal independently at bed side. Cont w/ POC.

## 2021-11-06 ENCOUNTER — APPOINTMENT (OUTPATIENT)
Dept: PHYSICAL THERAPY | Facility: CLINIC | Age: 76
DRG: 056 | End: 2021-11-06
Attending: PHYSICAL MEDICINE & REHABILITATION
Payer: MEDICARE

## 2021-11-06 ENCOUNTER — APPOINTMENT (OUTPATIENT)
Dept: OCCUPATIONAL THERAPY | Facility: CLINIC | Age: 76
DRG: 056 | End: 2021-11-06
Attending: PHYSICAL MEDICINE & REHABILITATION
Payer: MEDICARE

## 2021-11-06 LAB
GLUCOSE BLDC GLUCOMTR-MCNC: 127 MG/DL (ref 70–99)
GLUCOSE BLDC GLUCOMTR-MCNC: 132 MG/DL (ref 70–99)
GLUCOSE BLDC GLUCOMTR-MCNC: 140 MG/DL (ref 70–99)
GLUCOSE BLDC GLUCOMTR-MCNC: 178 MG/DL (ref 70–99)

## 2021-11-06 PROCEDURE — 97110 THERAPEUTIC EXERCISES: CPT | Mod: GO

## 2021-11-06 PROCEDURE — 128N000003 HC R&B REHAB

## 2021-11-06 PROCEDURE — 97112 NEUROMUSCULAR REEDUCATION: CPT | Mod: GP | Performed by: PHYSICAL THERAPIST

## 2021-11-06 PROCEDURE — 97530 THERAPEUTIC ACTIVITIES: CPT | Mod: GO

## 2021-11-06 PROCEDURE — 99231 SBSQ HOSP IP/OBS SF/LOW 25: CPT | Mod: GC | Performed by: STUDENT IN AN ORGANIZED HEALTH CARE EDUCATION/TRAINING PROGRAM

## 2021-11-06 PROCEDURE — 97535 SELF CARE MNGMENT TRAINING: CPT | Mod: GO

## 2021-11-06 PROCEDURE — 250N000013 HC RX MED GY IP 250 OP 250 PS 637: Performed by: PHYSICIAN ASSISTANT

## 2021-11-06 RX ADMIN — SENNOSIDES AND DOCUSATE SODIUM 1 TABLET: 8.6; 5 TABLET ORAL at 21:37

## 2021-11-06 RX ADMIN — POLYETHYLENE GLYCOL 3350 17 G: 17 POWDER, FOR SOLUTION ORAL at 08:00

## 2021-11-06 RX ADMIN — ASPIRIN 325 MG ORAL TABLET 325 MG: 325 PILL ORAL at 07:59

## 2021-11-06 RX ADMIN — ATORVASTATIN CALCIUM 40 MG: 40 TABLET, FILM COATED ORAL at 21:37

## 2021-11-06 RX ADMIN — SENNOSIDES AND DOCUSATE SODIUM 1 TABLET: 8.6; 5 TABLET ORAL at 08:00

## 2021-11-06 RX ADMIN — METFORMIN HYDROCHLORIDE 1000 MG: 500 TABLET ORAL at 17:29

## 2021-11-06 RX ADMIN — CLOPIDOGREL BISULFATE 75 MG: 75 TABLET, FILM COATED ORAL at 08:00

## 2021-11-06 RX ADMIN — INSULIN ASPART 1 UNITS: 100 INJECTION, SOLUTION INTRAVENOUS; SUBCUTANEOUS at 17:29

## 2021-11-06 RX ADMIN — PANTOPRAZOLE SODIUM 40 MG: 40 TABLET, DELAYED RELEASE ORAL at 06:17

## 2021-11-06 RX ADMIN — MIRTAZAPINE 7.5 MG: 7.5 TABLET, FILM COATED ORAL at 21:37

## 2021-11-06 RX ADMIN — METFORMIN HYDROCHLORIDE 1000 MG: 500 TABLET ORAL at 07:59

## 2021-11-06 RX ADMIN — ENALAPRIL MALEATE 10 MG: 5 TABLET ORAL at 08:00

## 2021-11-06 ASSESSMENT — ACTIVITIES OF DAILY LIVING (ADL)
ADLS_ACUITY_SCORE: 11
ADLS_ACUITY_SCORE: 12
ADLS_ACUITY_SCORE: 11
ADLS_ACUITY_SCORE: 11
ADLS_ACUITY_SCORE: 12
ADLS_ACUITY_SCORE: 11
ADLS_ACUITY_SCORE: 12
ADLS_ACUITY_SCORE: 11
ADLS_ACUITY_SCORE: 11
ADLS_ACUITY_SCORE: 12

## 2021-11-06 NOTE — PLAN OF CARE
A/O, able to make needs known. Continent of bowel and bladder, LBM 11/5. Denies pain. Started Remron rt reports of poor sleep, mood, and appetite. SBA using ww and gait belt. Regular diet/thin liquids, takes medications whole. Skin intact. No acute concerns, continue POC.

## 2021-11-06 NOTE — PROGRESS NOTES
"  [facility]   Acute Rehabilitation Unit  Daily progress note    INTERVAL HISTORY  Jose was seen and examined at bedside. He feels well today and has no new concerns or questions medically. He did get news that his wife is in the ICU at an outside hospital and he is very concerned about her well being. He denies chest pain, abdominal pain, shortness of breath, headache, nausea, vomiting. He reports that he slept well last night. He had a bowel movement last night.    RN: Sign out received from RN, no new issues.    MEDICATIONS    aspirin  325 mg Oral Daily     atorvastatin  40 mg Oral QPM     clopidogrel  75 mg Oral Daily     enalapril  10 mg Oral Daily     [Held by provider] glipiZIDE  2.5 mg Oral QAM AC     influenza vac high-dose quad  0.7 mL Intramuscular Prior to discharge     insulin aspart  1-7 Units Subcutaneous TID AC     insulin aspart  1-5 Units Subcutaneous At Bedtime     metFORMIN  1,000 mg Oral BID w/meals     mirtazapine  7.5 mg Oral At Bedtime     pantoprazole  40 mg Oral QAM AC     polyethylene glycol  17 g Oral Daily     senna-docusate  1 tablet Oral BID        acetaminophen, bisacodyl, glucose **OR** dextrose **OR** glucagon, melatonin, senna-docusate     PHYSICAL EXAM  /88 (BP Location: Left arm, Patient Position: Supine)   Pulse 88   Temp 96.8  F (36  C) (Oral)   Resp 18   Ht 1.778 m (5' 10\")   Wt 84.1 kg (185 lb 6.4 oz)   SpO2 96%   BMI 26.60 kg/m     Gen: NAD, upright in bed  HEENT: NC/AT  Cardio: RRR, no murmurs  Pulm: CTAB  Abd: normal BS  Ext: calves nontender  Neuro/MSK: moves all 4 limbs    LABS  CBC RESULTS:   Recent Labs   Lab Test 11/04/21  0646 11/01/21  0752 10/28/21  0654   WBC 6.4 9.0 6.8   RBC 4.40 4.74 4.78   HGB 13.6 14.4 14.6   HCT 39.1* 42.3 43.1   MCV 89 89 90   MCH 30.9 30.4 30.5   MCHC 34.8 34.0 33.9   RDW 11.8 11.9 12.0    169 153     Last Basic Metabolic Panel:  Recent Labs   Lab Test 11/06/21  0749 11/05/21  2143 " 11/05/21  1707 11/04/21  0907 11/04/21  0646 11/03/21  0732 11/03/21  0634 11/01/21  1239 11/01/21  0752   NA  --   --   --   --  136  --  139  --  137   POTASSIUM  --   --   --   --  4.4  --  4.1  --  4.1   CHLORIDE  --   --   --   --  104  --  106  --  105   CO2  --   --   --   --  25  --  29  --  27   ANIONGAP  --   --   --   --  7  --  4  --  5   * 174* 163*   < > 127*   < > 128*   < > 134*   BUN  --   --   --   --  16  --  16  --  18   CR  --   --   --   --  0.92  --  1.13  --  1.03   GFRESTIMATED  --   --   --   --  79  --  62  --  69   LEON  --   --   --   --  8.2*  --  8.3*  --  8.6    < > = values in this interval not displayed.       Rehabilitation - continue comprehensive acute inpatient rehabilitation program with multidisciplinary approach including therapies, rehab nursing, and physiatry following. See interval history for updates.      ASSESSMENT AND PLAN  Jose Mallory is a 76 year old right hand dominant male with a past medical history of DM2, HTN, HLD, GERD, posterior fossa arachnoid cyst, peripheral neuropathy, prior R pontine stroke, gait instability, and bilateral knee replacement who was admitted on 10/26/21 with progressive lower extremity weakness and gait difficulties, found to have subacute left pontine stroke with course complicated by neuropathy.  He is now admitted to ARU on 10/29 for multidisciplinary rehabilitation and ongoing medical management.     #Subacute L pontine stroke  #Subacute to chronic R pontine stroke  #Gait instability  #Arachnoid cyst  #Moderate cervical canal stenosis with disc herniation most pronounced at C3-C4, C4-C5   MRI with subacute cerebral infarction in left ventral cookie.  TTE EF 60-65%, no significant valvular disease or cardiac source for embolus.  Telemetry with NSR and no evidence of atrial fibrillation.  IV tPA was not initiated due to unclear or unfavorable risk-benefit profile for extended window thrombolysis beyond the conventional 4.5 hour  time window.  Etiology of stroke thought to be atherosclerotic. Gait instability suspected to be multifactorial including peripheral neuropathy, cerebellar disease, and recent bilateral pontine strokes.  - Continue DAPT with Aspirin 325 mg daily and Plavix 75 mg daily x90 days, then stop Plavix and continue Aspirin 325 mg daily only  - High intensity statin (atorvastatin 40 mg daily) to target LDL <70  - HbA1c at goal (6.3%)  - Long-term BP goal to 120/80  - PT noting festinating gait, very slow gait, intermittent freezing, cogwheeling, difficulty initiating motor tasks.  Consider neurology consult while remains at ARU to evaluate and potentially trial medication.  - Continue PT/OT  - Outpatient sleep evaluation for SELIN  - Follow up with neurology in 4-6 weeks  - Per hospital discharge summary, if gait has not improved after the pt's stay at acute rehab and/or by the time there is neurology follow up in the outpatient setting, please consider additional neurosurgery consultation.     #Peripheral neuropathy  #Decreased vibration sensation bilaterally in lower extremities  Per neurology, suspect that large fiber sensory dysfunction secondary to diabetes.  Vitamin B12/folate WNL, NEHA negative, HIV non reactive, RPR negative.  Copper, zinc WNL.  Protein electrophoresis with slightly elevated alpha 2, per pathologist, essentially normal, no obvious monoclonal proteins.  - Pending work-up: Homocysteine, MMA  - Follow up with neurology for EMG     #HTN  [PTA enalapril 5mg PO daily]  - BP generally elevated above goal.  Enalapril increased to 10 mg daily on 11/2 with improvement.  - Continue to monitor BP, adjust meds as indicated     #DMII  [PTA meds: metformin 1000 mg BID, glipizide 10 mg daily]  A1c 6.3%.  Patient reports compliance with medications, but that he was not taking his blood glucose at home as directed.    - Monitor blood glucose TID AC and HS.  BG currently 106-229  - Continue PTA metformin 1g BID  - PTA  glipizide 10 mg held throughout inpatient admission.  BG slightly elevated but also with variable intake.  Resumed at low dose 2.5 mg daily 11/2 but with mild hypoglycemia (66), now on hold until intake improved.  - Hypoglycemia protocol  - Will need new glucometer and supplies, diabetes educator consulted    Cr uptrending  0.86 -> 1.03 -> 1.13 on 11/3.  Poor oral fluid intake, also recently increased dose of enalapril. Patient agreeable to increasing oral fluids.    - Encourage fluids  - Repeat BMP 11/4 with significant improvement Cr 0.92.  Repeat labs Monday    Constipation  No BM for several days on admission, then with episode of emesis 10/31 and loose stools x2, now with recurrent constipation.   Receiving Miralax and Senokot-S.  Refused suppository 11/3.  Added prune juice.  Has refused suppository x2 days.  Feeling distended/full.  No recurrent nausea/vomiting.  Bowel sounds present, passing gas.  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  He notes decreased interest in food over the past year or so.  He has been sleeping poorly this admission.  Struggling with being away from family as well.  - Agreeable to psych consult, appreciate assistance  - Recommend trial of Remeron for mood, appetite, sleep.      1. Adjustment to disability:  Endorsing some PTA depression and having difficulty coping with current situation, especially today with prospect of needing longer ARU stay than initially anticipated.  Agreeable to psychology consult.  2. FEN: regular diet, thin liquids  3. Bowel: continent, constipation as above, AXR today and suppository or enema pending results  4. Bladder: continent, PVRs at admission x3 <100,  ok to monitor PRN only  5. DVT Prophylaxis: mechanical  6. GI Prophylaxis: PPI given DAPT + age  7. Code: full, confirmed on admission  8. Disposition: goal for home  9. ELOS: 7 days  10. Follow up Appointments on Discharge: PCP, stroke and general neurology      The patient's assessment and plan was discussed with my attending physician Dr. Justino Okeefe, DO  PGY-4 PM&R Resident    Physician Attestation   I, Genie Badillo MD, personally examined and evaluated this patient.  I discussed the patient with the resident/fellow and care team, and agree with the assessment and plan of care as documented in the note of 11/6/21. Any changes made by me in documentation are already reflected in the note.     I personally reviewed vital signs, medications, labs, and imaging.  I spent a total of 15 minutes face-to-face and managing the care of the patient. Over 50% of my time on the unit was spent counseling the patient and coordinating care. See note for details.    Genie Badillo MD  Date of Service (when I saw the patient): 11/06/21

## 2021-11-06 NOTE — PLAN OF CARE
"FOCUS/GOAL  Medical management    ASSESSMENT, INTERVENTIONS AND CONTINUING PLAN FOR GOAL:  VSS. Son contacted the unit seeking for early discharge if possible to bring their dad home to be close to his wife who is in hospital. She is declining and had a \"bad night\".  contacted, Provider and therapy aware of the situation. Patient to communicate with  Wife trough video call, situation to be reassessed next week per . Family satisfied with actual plan. Patient will have shower on 11/7, 11/10 and 11/13 in preparation for \"sudden discharge\" per therapy.  Supportive care given with patient appreciation. Voided, no BM to report, denied  Pain. Will continue with POC.    "

## 2021-11-06 NOTE — PLAN OF CARE
"Discharge Planner Post-Acute Rehab PT:      Discharge Plan: Home with OP PT with discharge date anticipated 11/19     Precautions: Fall precaution     Current Status:  Bed Mobility: Ind  Transfer: CGA without device to complete stand pivot transfer  Gait: AMb >250 with U step walker and SBA. Intermittent freezing/festinating.  Stairs: 8 6\" steps with B railings and reciprocal pattern needing increased UE support for L step;  limited due to dizziness  Balance:   PASS:  22/36  Haas 18/56  Gait Speed: .19 m/s     Assessment: no episodes of freezing gait today w/ U step walker. Responded well to reactive balance training and able to gain stepping response skill w/ specific task  Other Barriers to Discharge (DME, Family Training, etc): None  "

## 2021-11-06 NOTE — PLAN OF CARE
Discharge Planner Post-Acute Rehab OT:      Discharge Plan: home with wife     Precautions: falls     Current Status:  ADLs:   G/H - standing/sitting EOS with 4WW SBA with set up  U/b - seated SBA with set up  L/B dressing - seated EOB with set up and stand don over hips close SBA  Feet: seated, doff/don socks using cross leg tech  Shower: ot sba min cues for sequencing slower processing of shower task pt able to wash all areas  inclusing crossing leg over other to gt lowerr leg and feet and standing with cga  with rail for pericares from front and back     IADLs: TBA     Vision/Cognition: slums increased to 27/30  Will continue functional cog tasks     Assessment: Pt expressing increased fatigue during AM session possibly due to being worried for his wife. Pt participated with some encouragement in walker safety training and ADLs training. OT demonstrated safe walker maneuvering for placing walker behind him when standing at a table top/sink/ vanity for a prolonged period of time. Pt demonstrating understanding by completing the maneuver and standing at 4 different places. Dynamic standing while WS from L/R to increase safety during IADLs and ADLs also completed. Pt motivated to get more I. Needs more safety training for turning with walker, turning to sit at a chair or going through narrower path. Limited dynamic balance, spatial awareness, gross coordination, and motor control of BLE limiting safety and I. Pt will continue to benefit from skilled OT to reach max potential level of I and safety in I/ADLs. Cont per POC    Other Barriers to Discharge (DME, Family Training, etc): daughter in law PT pt has rolling walker and higher toilets at home

## 2021-11-06 NOTE — PLAN OF CARE
A/O, able to make needs known. Continent of bowel and bladder. Denies pain. SBA using ww and gait belt. Regular diet/thin liquids, takes medications whole.  and 140. Denies pain. Skin intact. Mood WNL, allowed time to express concerns and fears rt wife's acute illness. No acute concerns, continue POC.

## 2021-11-06 NOTE — PROGRESS NOTES
Care Management Follow Up    Length of Stay (days): 8    Expected Discharge Date: 11/19/2021     Concerns to be Addressed:     Early discharge   Patient plan of care discussed at interdisciplinary rounds: unknown    Anticipated Discharge Disposition:  TBD     Anticipated Discharge Services:    Anticipated Discharge DME:      Patient/family educated on Medicare website which has current facility and service quality ratings:    Education Provided on the Discharge Plan:    Patient/Family in Agreement with the Plan:      Referrals Placed by CM/SW:    Private pay costs discussed: Not applicable    Additional Information:    1120 MAVIS was paged to call  ARU (*[phone]) regarding time sensitive pt issue.  Shortly thereafter, MAVIS spoke with ARU Charge RN Lanette regarding pt's home situation and that pt's wife was in the hospital in grave condition and that the family was interested in pursuing early discharge so that pt could see his wife if her condition worsened. MAVIS agreed to follow up with son.    8468 MAVIS spoke with sonNghia ([phone]) regarding situation. MAVIS explained that there were several options available pending pt's appropriateness for discharge: Discharge home with home health services including  OT/PT; discharge to a TCU closer to hospital where his wife was admitted; or facilitate video calls if pt unsuitable for discharge.    MAVIS also explained the benefits and barriers to each choice:    If pt was safe to discharge home with home health care, he would likely receive less therapy than if he remained inpatient, and there would likely be a lag between discharge and services beginning. This could disrupt his progress and possibly negative impact it.    If pt were to discharge to a closer facility, due to timing (being the weekend)  and current barriers to TCU placement (difficulty placing pt's due to staffing issues at facilities), and the available appropriate facilities near spouse's location, it was highly  unlikely that pt would be able to discharge prior to Monday.     [facility] and speak with his wife via video call, he could retain his progress, but would lose the immediacy of face-to-face contact.  Nghia told MAVIS that his parents had spoken earlier today and were satisfied with being able to talk on the phone. He shared that both of his parents wanted pt to remain at facility because his rehab was so important. He expressed his family's concern over the possibility of their mother taking a turn for the worse and passing while [facility]. He said that his father would be emotionally devastated if that happened. MAVIS validated the family's concern.    Nghia told MAVIS that he felt that pt was not safe to discharge home at this time. He said that ideally pt would discharge to a closer facility, but expressed understanding that this was unlikely to be accomplished in a timely fashion to meet the emotional needs of the family. He said that he would like pt's care team input on his father's suitability for discharge to a TCU closer to his mother's location.    MAVIS agreed to update pt's care team regarding this conversation. MAVIS also gave Nghia MAVIS's weekend availability and contact information if he had any questions or concerns. MAVIS then ended the call.    1142 MAVIS updated Charge RN Lanette. MAVIS then spoke with bedside GABBIE Jeronimo and updated her, as well. RN asked if it was appropriate to update attending MD regarding the situation and the family's request for an early discharge for pt. MAVIS told RN that it would be appropriate and RN agreed to update attending MD.    Bedside RN also shared that pt had a bad night overnight 11/4-11/5 . She reported that he attributed this to receiving a call from his sister-in-law who had told him that her sister's condition was his fault.     MAVIS will continue to follow as needed.    Sparkle Garces, MSW, [facility]  ED/OBS   [facility]  Phone:  [phone]  Pager: [phone]  Fax: [phone]     On-call pager, [phone], 4:00 pm to midnight

## 2021-11-07 ENCOUNTER — APPOINTMENT (OUTPATIENT)
Dept: OCCUPATIONAL THERAPY | Facility: CLINIC | Age: 76
DRG: 056 | End: 2021-11-07
Attending: PHYSICAL MEDICINE & REHABILITATION
Payer: MEDICARE

## 2021-11-07 ENCOUNTER — APPOINTMENT (OUTPATIENT)
Dept: PHYSICAL THERAPY | Facility: CLINIC | Age: 76
DRG: 056 | End: 2021-11-07
Attending: PHYSICAL MEDICINE & REHABILITATION
Payer: MEDICARE

## 2021-11-07 LAB
GLUCOSE BLDC GLUCOMTR-MCNC: 134 MG/DL (ref 70–99)
GLUCOSE BLDC GLUCOMTR-MCNC: 135 MG/DL (ref 70–99)
GLUCOSE BLDC GLUCOMTR-MCNC: 141 MG/DL (ref 70–99)
GLUCOSE BLDC GLUCOMTR-MCNC: 142 MG/DL (ref 70–99)

## 2021-11-07 PROCEDURE — 97112 NEUROMUSCULAR REEDUCATION: CPT | Mod: GP | Performed by: PHYSICAL THERAPIST

## 2021-11-07 PROCEDURE — 250N000013 HC RX MED GY IP 250 OP 250 PS 637: Performed by: PHYSICIAN ASSISTANT

## 2021-11-07 PROCEDURE — 97116 GAIT TRAINING THERAPY: CPT | Mod: GP | Performed by: PHYSICAL THERAPIST

## 2021-11-07 PROCEDURE — 97110 THERAPEUTIC EXERCISES: CPT | Mod: GO

## 2021-11-07 PROCEDURE — 97535 SELF CARE MNGMENT TRAINING: CPT | Mod: GO

## 2021-11-07 PROCEDURE — 128N000003 HC R&B REHAB

## 2021-11-07 RX ADMIN — SENNOSIDES AND DOCUSATE SODIUM 1 TABLET: 8.6; 5 TABLET ORAL at 21:14

## 2021-11-07 RX ADMIN — PANTOPRAZOLE SODIUM 40 MG: 40 TABLET, DELAYED RELEASE ORAL at 06:58

## 2021-11-07 RX ADMIN — METFORMIN HYDROCHLORIDE 1000 MG: 500 TABLET ORAL at 07:52

## 2021-11-07 RX ADMIN — INSULIN ASPART 1 UNITS: 100 INJECTION, SOLUTION INTRAVENOUS; SUBCUTANEOUS at 12:10

## 2021-11-07 RX ADMIN — ENALAPRIL MALEATE 10 MG: 5 TABLET ORAL at 07:53

## 2021-11-07 RX ADMIN — INSULIN ASPART 1 UNITS: 100 INJECTION, SOLUTION INTRAVENOUS; SUBCUTANEOUS at 07:51

## 2021-11-07 RX ADMIN — METFORMIN HYDROCHLORIDE 1000 MG: 500 TABLET ORAL at 17:06

## 2021-11-07 RX ADMIN — ASPIRIN 325 MG ORAL TABLET 325 MG: 325 PILL ORAL at 07:53

## 2021-11-07 RX ADMIN — CLOPIDOGREL BISULFATE 75 MG: 75 TABLET, FILM COATED ORAL at 07:53

## 2021-11-07 RX ADMIN — POLYETHYLENE GLYCOL 3350 17 G: 17 POWDER, FOR SOLUTION ORAL at 07:53

## 2021-11-07 RX ADMIN — SENNOSIDES AND DOCUSATE SODIUM 1 TABLET: 8.6; 5 TABLET ORAL at 07:54

## 2021-11-07 RX ADMIN — ATORVASTATIN CALCIUM 40 MG: 40 TABLET, FILM COATED ORAL at 21:14

## 2021-11-07 RX ADMIN — MIRTAZAPINE 7.5 MG: 7.5 TABLET, FILM COATED ORAL at 21:14

## 2021-11-07 ASSESSMENT — ACTIVITIES OF DAILY LIVING (ADL)
ADLS_ACUITY_SCORE: 12
ADLS_ACUITY_SCORE: 11
ADLS_ACUITY_SCORE: 12
ADLS_ACUITY_SCORE: 11
ADLS_ACUITY_SCORE: 12
ADLS_ACUITY_SCORE: 11
ADLS_ACUITY_SCORE: 12
ADLS_ACUITY_SCORE: 11
ADLS_ACUITY_SCORE: 12
ADLS_ACUITY_SCORE: 11

## 2021-11-07 NOTE — PLAN OF CARE
Discharge Planner Post-Acute Rehab OT:      Discharge Plan: home with wife     Precautions: falls     Current Status:  ADLs:   G/H - standing/sitting EOS with Uwalker/4WW SBA with set up  U/b - seated SBA with set up  L/B dressing - seated EOB with set up and stand don over hips close SBA  Feet: seated, doff/don socks using cross leg tech  Shower: OT sba min cues for sequencing slower processing of shower task pt able to wash all areas  inclusing crossing leg over other to gt lowerr leg and feet and standing with cga  with rail for pericares from front and back  Tub/shower transfer: CGA     IADLs: TBA     Vision/Cognition: slums increased to 27/30  Will continue functional cog tasks     Assessment: Completed tub/shower transfer training simulating home environment. Pt has two options for shower, walk in and tub. Pt unsure which one he will use. OT demonstrates safe transfer for both options and educated need for different shower chairs and grab bars. Needs more safety training for turning with walker, turning to sit at a chair or going through narrower path. Limited dynamic balance, spatial awareness, gross coordination, and motor control of BLE limiting safety and I. Pt worried for his wife who is also in a hospital at this time.  Pt will continue to benefit from skilled OT to reach max potential level of I and safety in I/ADLs. Cont per POC     Other Barriers to Discharge (DME, Family Training, etc): daughter in law PT pt has rolling walker and higher toilets at home. Tub bench or small shower stool depending on which bathroom pt decides to use.

## 2021-11-07 NOTE — PLAN OF CARE
FOCUS/GOAL  Medical management    ASSESSMENT, INTERVENTIONS AND CONTINUING PLAN FOR GOAL:  VSS.  Patient reported feeling good this morning, stated had good news about wife. BG above 140 both meals. Denied pain.Reported voiding well, No BM this morning. Will continue with POC.

## 2021-11-07 NOTE — PLAN OF CARE
FOCUS/GOAL  Bowel management, Bladder management, and Pain management    ASSESSMENT, INTERVENTIONS AND CONTINUING PLAN FOR GOAL:    Pt slept well during the overnight denies pain. Using call light and able to make needs known. Continent of bladder. Continue with plan of care.

## 2021-11-07 NOTE — PROGRESS NOTES
SW notified by admissions regarding situation yesterday w/ family wanting pt to transfer to a TCU in [place], [place] so pt can see his wife who is currently in the hospital and dying.     SW called pt's son, Nghia (PH: [phone]), and introduced self/role. SW asked Nghia if he had any f/u questions from yesterday's conversation w/ SW. Per SW note from yesterday, family wants to see if pt can transfer to a TCU in closer to [place] so he can see his wife in the hospital. Nghia shared that family is still interested in pursuing this if the team is OK w/ this and if there's ARU or TCU availability closer to home. SW informed Nghia that SW will update floor SW to discuss w/ team. Nghia was agreeable. SW asked Nghia if it's even possible for pt to visit his wife at the hospital if he's at a TCU closer to [place]; SW shared that some facilities aren't allowing pt's to do visits d/t COVID. Nghia shared that pt and his wife have the same PCP and they said the pt can visit his wife in the hospital.     SW will update floor SW.     Coleen Ely MSW, [facility]   Phone: [phone]  Pager: [phone]

## 2021-11-08 ENCOUNTER — APPOINTMENT (OUTPATIENT)
Dept: PHYSICAL THERAPY | Facility: CLINIC | Age: 76
DRG: 056 | End: 2021-11-08
Attending: PHYSICAL MEDICINE & REHABILITATION
Payer: MEDICARE

## 2021-11-08 ENCOUNTER — APPOINTMENT (OUTPATIENT)
Dept: OCCUPATIONAL THERAPY | Facility: CLINIC | Age: 76
DRG: 056 | End: 2021-11-08
Attending: PHYSICAL MEDICINE & REHABILITATION
Payer: MEDICARE

## 2021-11-08 LAB
ANION GAP SERPL CALCULATED.3IONS-SCNC: 5 MMOL/L (ref 3–14)
ATRIAL RATE - MUSE: 93 BPM
BUN SERPL-MCNC: 21 MG/DL (ref 7–30)
CALCIUM SERPL-MCNC: 8.6 MG/DL (ref 8.5–10.1)
CHLORIDE BLD-SCNC: 105 MMOL/L (ref 94–109)
CO2 SERPL-SCNC: 26 MMOL/L (ref 20–32)
CREAT SERPL-MCNC: 0.94 MG/DL (ref 0.66–1.25)
DIASTOLIC BLOOD PRESSURE - MUSE: NORMAL MMHG
ERYTHROCYTE [DISTWIDTH] IN BLOOD BY AUTOMATED COUNT: 11.9 % (ref 10–15)
GFR SERPL CREATININE-BSD FRML MDRD: 77 ML/MIN/1.73M2
GLUCOSE BLD-MCNC: 125 MG/DL (ref 70–99)
GLUCOSE BLDC GLUCOMTR-MCNC: 137 MG/DL (ref 70–99)
GLUCOSE BLDC GLUCOMTR-MCNC: 138 MG/DL (ref 70–99)
GLUCOSE BLDC GLUCOMTR-MCNC: 150 MG/DL (ref 70–99)
GLUCOSE BLDC GLUCOMTR-MCNC: 164 MG/DL (ref 70–99)
HCT VFR BLD AUTO: 39.9 % (ref 40–53)
HGB BLD-MCNC: 13.6 G/DL (ref 13.3–17.7)
INTERPRETATION ECG - MUSE: NORMAL
MCH RBC QN AUTO: 30.6 PG (ref 26.5–33)
MCHC RBC AUTO-ENTMCNC: 34.1 G/DL (ref 31.5–36.5)
MCV RBC AUTO: 90 FL (ref 78–100)
P AXIS - MUSE: 29 DEGREES
PLATELET # BLD AUTO: 156 10E3/UL (ref 150–450)
POTASSIUM BLD-SCNC: 4.3 MMOL/L (ref 3.4–5.3)
PR INTERVAL - MUSE: 160 MS
QRS DURATION - MUSE: 84 MS
QT - MUSE: 346 MS
QTC - MUSE: 430 MS
R AXIS - MUSE: -28 DEGREES
RBC # BLD AUTO: 4.44 10E6/UL (ref 4.4–5.9)
SODIUM SERPL-SCNC: 136 MMOL/L (ref 133–144)
SYSTOLIC BLOOD PRESSURE - MUSE: NORMAL MMHG
T AXIS - MUSE: 61 DEGREES
VENTRICULAR RATE- MUSE: 93 BPM
WBC # BLD AUTO: 6.4 10E3/UL (ref 4–11)

## 2021-11-08 PROCEDURE — 36415 COLL VENOUS BLD VENIPUNCTURE: CPT | Performed by: PHYSICIAN ASSISTANT

## 2021-11-08 PROCEDURE — 97535 SELF CARE MNGMENT TRAINING: CPT | Mod: GO

## 2021-11-08 PROCEDURE — 80048 BASIC METABOLIC PNL TOTAL CA: CPT | Performed by: PHYSICIAN ASSISTANT

## 2021-11-08 PROCEDURE — 97116 GAIT TRAINING THERAPY: CPT | Mod: GP

## 2021-11-08 PROCEDURE — 250N000013 HC RX MED GY IP 250 OP 250 PS 637: Performed by: PHYSICIAN ASSISTANT

## 2021-11-08 PROCEDURE — 97112 NEUROMUSCULAR REEDUCATION: CPT | Mod: GP

## 2021-11-08 PROCEDURE — 93005 ELECTROCARDIOGRAM TRACING: CPT

## 2021-11-08 PROCEDURE — 93010 ELECTROCARDIOGRAM REPORT: CPT | Performed by: INTERNAL MEDICINE

## 2021-11-08 PROCEDURE — 128N000003 HC R&B REHAB

## 2021-11-08 PROCEDURE — 85027 COMPLETE CBC AUTOMATED: CPT | Performed by: PHYSICIAN ASSISTANT

## 2021-11-08 PROCEDURE — 97530 THERAPEUTIC ACTIVITIES: CPT | Mod: GO

## 2021-11-08 PROCEDURE — 99233 SBSQ HOSP IP/OBS HIGH 50: CPT | Performed by: PHYSICAL MEDICINE & REHABILITATION

## 2021-11-08 RX ADMIN — ASPIRIN 325 MG ORAL TABLET 325 MG: 325 PILL ORAL at 09:05

## 2021-11-08 RX ADMIN — MIRTAZAPINE 7.5 MG: 7.5 TABLET, FILM COATED ORAL at 21:18

## 2021-11-08 RX ADMIN — METFORMIN HYDROCHLORIDE 1000 MG: 500 TABLET ORAL at 09:06

## 2021-11-08 RX ADMIN — METFORMIN HYDROCHLORIDE 1000 MG: 500 TABLET ORAL at 17:36

## 2021-11-08 RX ADMIN — POLYETHYLENE GLYCOL 3350 17 G: 17 POWDER, FOR SOLUTION ORAL at 09:15

## 2021-11-08 RX ADMIN — SENNOSIDES AND DOCUSATE SODIUM 1 TABLET: 8.6; 5 TABLET ORAL at 21:18

## 2021-11-08 RX ADMIN — PANTOPRAZOLE SODIUM 40 MG: 40 TABLET, DELAYED RELEASE ORAL at 06:00

## 2021-11-08 RX ADMIN — SENNOSIDES AND DOCUSATE SODIUM 1 TABLET: 8.6; 5 TABLET ORAL at 09:06

## 2021-11-08 RX ADMIN — INSULIN ASPART 1 UNITS: 100 INJECTION, SOLUTION INTRAVENOUS; SUBCUTANEOUS at 17:35

## 2021-11-08 RX ADMIN — ATORVASTATIN CALCIUM 40 MG: 40 TABLET, FILM COATED ORAL at 21:18

## 2021-11-08 RX ADMIN — ENALAPRIL MALEATE 10 MG: 5 TABLET ORAL at 09:05

## 2021-11-08 RX ADMIN — CLOPIDOGREL BISULFATE 75 MG: 75 TABLET, FILM COATED ORAL at 09:05

## 2021-11-08 ASSESSMENT — ACTIVITIES OF DAILY LIVING (ADL)
ADLS_ACUITY_SCORE: 11

## 2021-11-08 NOTE — CONSULTS
"  Health Psychology                  Clinic    Department of Medicine  Melony Polk, Ph.D., L.P. [phone]                          Clinics and Surgery Center  [facility] Kristi Loja, Ph.D.,  L.P. [phone]                 3rd Floor  [place] Mail Code 748   Mary Mcghee, Ph.D., L.P. [phone]    [address] Lora Kwan, Ph.D., L.P. [phone]            [place] [address]          Grayson Bledsoe, Ph.D., A.B.P.P., L.P. [phone]      Nanette Fischer, Ph.D., L.P. [phone]      Inpatient Health Psychology Consultation*    DOS: 11/08/2021    Short conversation with Mr Mallory after seeing notes about increased concern about his wife and questions from son re moving to a facility closer to home.  He was clear that he had not initiated that conversation. He feels that he wants to be in \"the best place\" for his own rehabilitation and feels confident that staying on this ARC is his best choice.  Having a difficult day, low energy.  Agrees that we will meet later this week.    Melony Polk, PhD, LP  "

## 2021-11-08 NOTE — PROGRESS NOTES
CLINICAL NUTRITION SERVICES - BRIEF NOTE    NEW FINDINGS   Chart review only today, MAR reviewed. Labs reviewed. Weight trends reviewed. Due to pt therapy schedule for the rest of the day, RD will plan for full follow up on rounds day (Wednesday). Po intakes seem to be improving to %, supplements continue.    INTERVENTIONS  Implementation  Medical food supplement therapy - continue as ordered.     Monitoring/Evaluation  Will continue to monitor and evaluate per protocol.    Ynes Emery RD, CNSC, LD  ARU RD pager: [phone]

## 2021-11-08 NOTE — PLAN OF CARE
FOCUS/GOAL  Bowel management and Medical management    ASSESSMENT, INTERVENTIONS AND CONTINUING PLAN FOR GOAL:  Pt denies pain, chest pain or SOB.  and 138. Heart rhythm irregular, EKG done today, see result.   Continent of urine, pt used urinal independently. LBM 11/5, scheduled bowel medications given. Pt reports not feeling constipated. Needing CGA and walker for mobility.   Pt participating in therapies.

## 2021-11-08 NOTE — PLAN OF CARE
"Discharge Planner Post-Acute Rehab PT:      Discharge Plan: Home with OP PT with discharge date anticipated 11/19     Precautions: Fall precaution     Current Status:  Bed Mobility: Ind  Transfer: CGA without device to complete stand pivot transfer  Gait: AMb >250 with U step walker and SBA. Intermittent freezing/festinating.  Stairs: 8 6\" steps with B railings and reciprocal pattern needing increased UE support for L step;  limited due to dizziness  Balance:   PASS:  22/36  Haas 18/56  Gait Speed: .19 m/s     Assessment: Pt w/ more fatigue today in both AM and PM sessions. Gait w/ U step w/o freezing episodes but fesination present. Responds well to both visual laser cue provided by Ustep and VCs to \"stop and re-set\" stability prior to continuing. Limited tolerance to balance reaction training in PM.     Other Barriers to Discharge (DME, Family Training, etc): None  "

## 2021-11-08 NOTE — PLAN OF CARE
A/O, able to make needs known. Continent of bowel and bladder. SBA using ww and gait belt. Denies pain. Regular diet/thin liquids, takes medications whole. Skin intact.  and 134. Mood WNL, good appetite. No acute concerns, continue POC.

## 2021-11-08 NOTE — PLAN OF CARE
FOCUS/GOAL  Bowel management, Bladder management, Pain management and Cognition/Memory/Judgment/Problem solving    ASSESSMENT, INTERVENTIONS AND CONTINUING PLAN FOR GOAL:  Pt is alert and oriented x4, denies fever, chills, chest pain, SOB, N/V, abdominal pain, or new weakness/numbness/tingling, continent of bowel and bladder, transferring with assist of 1 and ww, sleeping well throughout the night, no tubes, lines or drains, continued to be HTN in am, no further care concerns at this time continue with POC.

## 2021-11-08 NOTE — PROGRESS NOTES
MAVIS notified by PA and Health Psychologist that pt would prefer to stay at [facility] for the rest of his rehab course, see PA note from today with more information.     Health Psychologist notified Samuelr that when meeting with pt this morning, pt son Corona called and requested that SW call him today. SWer met with pt at bedside, got permission to call Corona and got Corona's number. Number for Corona and Paulo added to pt face sheet. SWer called Corona and left a VM. Waiting on a return call.     ADDENDUM: Corona called back. Corona stated that pt's wife is 'not healthy'. Corona stated 'they aren't going down but not going up either'. Corona stated that he and his other siblings would like pt to transfer to a facility closer to home. SWer informed Corona that the PA, Health Psychologist and this writer spoke with pt about his options and preference and pt prefers to stay at [facility]. SWer informed Corona that if pt was to change his mind, SW would support and coordinate for pt to get closer to home but that he is his own decision-maker. Corona expressed understanding. SWer provided sympathy and validation for Marisa's concerns. Napoleonod appreciative of time and denied additional needs.     ADDENDUM: Pt other son Nghia called 2pm today. Nghia also requesting SWer assistance with transferring pt closer to home. SWer reiterated that unless agreeable (which at this time he prefers to stay local), SW can not send TCU referrals or call facilities. Pt son expressed understanding. Pt son also acknowledged being in a 'rock and hard place' and understanding that 'in best interest to stay at [facility] for his rehab'. Pt son Nghia is coming into town tomorrow to stay local until pt ready for discharge, unless he needs to travel back home.     Mabel Mckoy, [facility]   Direct Phone: [phone]  I   Pager: [phone]  I  Fax: [phone]

## 2021-11-08 NOTE — PLAN OF CARE
Discharge Planner Post-Acute Rehab OT:      Discharge Plan: home with wife     Precautions: falls     Current Status:  ADLs:   G/H - standing/sitting EOS with Uwalker/4WW SBA with set up  U/b - seated SBA with set up  L/B dressing - seated EOB with set up and stand don over hips close SBA  Feet: seated, doff/don socks using cross leg tech  Shower: OT sba min cues for sequencing slower processing of shower task pt able to wash all areas  inclusing crossing leg over other to gt lowerr leg and feet and standing with cga  with rail for pericares from front and back  Tub/shower transfer: CGA     IADLs: TBA     Vision/Cognition: slums increased to 27/30  Will continue functional cog tasks     Assessment: ot increasae effor and cues to get b feet into shorts feeet getting stuck on material ot cga with verbal cues increae leaning to l getting out of l side of bed. OT socks crossing leg over other lincreased leaning to l and r able to compenstae with sitting in w/c. pt pror increase balance sitting eob with dressing this date leaning r and l with dressing and sitting balance donning pillow cases on pillow mainly leaning to r,  notified of balance. Pm increased core balance to level prior last week. ot focus on ambulation to clinic advanced trasnfers core sitting balance with  theraputic stool with moving backwards forwards rowing forward/backward, kayack rowing and moving stool with feet and picking up items good balance slow motions  with moving feet forward with practice increasinng moving stool backwards nce slow motions Pt will continue to benefit from skilled OT to reach max potential level of I and safety in I/ADLs. Cont per POC     Other Barriers to Discharge (DME, Family Training, etc): daughter in law PT pt has rolling walker and higher toilets at home. Tub bench or small shower stool depending on which bathroom pt decides to use.

## 2021-11-08 NOTE — PROGRESS NOTES
[facility]   Acute Rehabilitation Unit  Daily progress note    INTERVAL HISTORY  Jose was seen and examined at bedside this morning.  Weekend therapy and progress notes reviewed.  No acute events reported; however, patient's son did contact the floor with concerns about patient's wife medical status and possibility of pursuing earlier discharge.  Today, patient reports that at this time, he would like to continue his ARU stay as he feels this is the most appropriate level of care for his rehab needs.  Discussed different options and expressed support in facilitating whatever option his family feels is best given wife's status.  Patient reports today that he is feeling more fatigued today and does not feel he performed as well in AM therapies.  OT also noted balance not as good this AM.  Patient states that he is sleeping better generally, though is reasonably worried about his wife's situation.  He feels fatigue may be related to busy day yesterday with therapies and social visit.  He denies feeling feverish/chilled, no increased shortness of breath or cough, no chest pain or palpitations, no dizziness or lightheadedness.  He acknowledges ongoing impaired intake of food/fluids.  States no BM for several days.    Functionally, he is currently performing stand pivot transfers with contact guard assist without device, ambulating >250' with U step walker and standby assist, with intermittent freezing/festinating though decreased yesterday per PT.  He is currently needing standby assist with set-up for grooming/hygiene, standby assist for seated dressing.    MEDICATIONS    aspirin  325 mg Oral Daily     atorvastatin  40 mg Oral QPM     clopidogrel  75 mg Oral Daily     enalapril  10 mg Oral Daily     influenza vac high-dose quad  0.7 mL Intramuscular Prior to discharge     insulin aspart  1-7 Units Subcutaneous TID AC     insulin aspart  1-5 Units Subcutaneous At Bedtime      "metFORMIN  1,000 mg Oral BID w/meals     mirtazapine  7.5 mg Oral At Bedtime     pantoprazole  40 mg Oral QAM AC     polyethylene glycol  17 g Oral Daily     senna-docusate  1 tablet Oral BID        acetaminophen, bisacodyl, glucose **OR** dextrose **OR** glucagon, melatonin, senna-docusate     PHYSICAL EXAM  BP (!) 144/83 (BP Location: Left arm)   Pulse 83   Temp (!) 96.6  F (35.9  C) (Oral)   Resp 20   Ht 1.778 m (5' 10\")   Wt 84.1 kg (185 lb 6.4 oz)   SpO2 94%   BMI 26.60 kg/m     Gen: NAD, appears down, slightly lethargic, lying in bed  HEENT: NC/AT  Cardio: slightly irregular rate, ?ectopic beats or intermittent pauses, no murmurs  Pulm: non-labored on room air, lungs CTA bilaterally  Abd: soft, non-tender, non-distended, bowel sounds present  Ext: no edema in bilateral lower extremities  Neuro/MSK: sleepy but responds appropriately, follows commands, moving all 4 extremities in bed     LABS  CBC RESULTS:   Recent Labs   Lab Test 11/08/21  0522 11/04/21  0646 11/01/21  0752   WBC 6.4 6.4 9.0   RBC 4.44 4.40 4.74   HGB 13.6 13.6 14.4   HCT 39.9* 39.1* 42.3   MCV 90 89 89   MCH 30.6 30.9 30.4   MCHC 34.1 34.8 34.0   RDW 11.9 11.8 11.9    150 169     Last Basic Metabolic Panel:  Recent Labs   Lab Test 11/08/21  0743 11/08/21  0522 11/07/21  2108 11/04/21  0907 11/04/21  0646 11/03/21  0732 11/03/21  0634   NA  --  136  --   --  136  --  139   POTASSIUM  --  4.3  --   --  4.4  --  4.1   CHLORIDE  --  105  --   --  104  --  106   CO2  --  26  --   --  25  --  29   ANIONGAP  --  5  --   --  7  --  4   * 125* 134*   < > 127*   < > 128*   BUN  --  21  --   --  16  --  16   CR  --  0.94  --   --  0.92  --  1.13   GFRESTIMATED  --  77  --   --  79  --  62   LEON  --  8.6  --   --  8.2*  --  8.3*    < > = values in this interval not displayed.       Rehabilitation - continue comprehensive acute inpatient rehabilitation program with multidisciplinary approach including therapies, rehab nursing, and " physiatry following. See interval history for updates.      ASSESSMENT AND PLAN  Jose Mallory is a 76 year old right hand dominant male with a past medical history of DM2, HTN, HLD, GERD, posterior fossa arachnoid cyst, peripheral neuropathy, prior R pontine stroke, gait instability, and bilateral knee replacement who was admitted on 10/26/21 with progressive lower extremity weakness and gait difficulties, found to have subacute left pontine stroke with course complicated by neuropathy.  He is now admitted to ARU on 10/29 for multidisciplinary rehabilitation and ongoing medical management.     #Subacute L pontine stroke  #Subacute to chronic R pontine stroke  #Gait instability  #Arachnoid cyst  #Moderate cervical canal stenosis with disc herniation most pronounced at C3-C4, C4-C5   MRI with subacute cerebral infarction in left ventral cookie.  TTE EF 60-65%, no significant valvular disease or cardiac source for embolus.  Telemetry with NSR and no evidence of atrial fibrillation.  IV tPA was not initiated due to unclear or unfavorable risk-benefit profile for extended window thrombolysis beyond the conventional 4.5 hour time window.  Etiology of stroke thought to be atherosclerotic. Gait instability suspected to be multifactorial including peripheral neuropathy, cerebellar disease, and recent bilateral pontine strokes.  - Continue DAPT with Aspirin 325 mg daily and Plavix 75 mg daily x90 days, then stop Plavix and continue Aspirin 325 mg daily only  - High intensity statin (atorvastatin 40 mg daily) to target LDL <70  - HbA1c at goal (6.3%)  - Long-term BP goal to 120/80  - PT noting festinating gait, very slow gait, intermittent freezing, cogwheeling, difficulty initiating motor tasks.  Consider neurology consult while remains at ARU to evaluate and potentially trial medication.  - Increased fatigue today, no changes neuro exam.  Attributes to busy day yesterday, family stressors, but OT also noting balance not as  good this morning.  Irregular HR, will obtain EKG.  If unremarkable and fucntion not improved this afternoon after additional rest this morning, consider further evaluation.  - Continue PT/OT  - Outpatient sleep evaluation for SELIN  - Follow up with neurology in 4-6 weeks  - Per hospital discharge summary, if gait has not improved after the pt's stay at acute rehab and/or by the time there is neurology follow up in the outpatient setting, please consider additional neurosurgery consultation.     #Peripheral neuropathy  #Decreased vibration sensation bilaterally in lower extremities  Per neurology, suspect that large fiber sensory dysfunction secondary to diabetes.  Vitamin B12/folate WNL, NEHA negative, HIV non reactive, RPR negative.  Copper, zinc WNL.  Protein electrophoresis with slightly elevated alpha 2, per pathologist, essentially normal, no obvious monoclonal proteins.  - Pending work-up: Homocysteine, MMA  - Follow up with neurology for EMG     #HTN  [PTA enalapril 5mg PO daily]  Enalapril increased to 10 mg daily on 11/2 due to generally elevated BP.  - BP generally improved, though intermittently elevated.  Monitor need for further dose adjustments  - Continue to monitor BP, adjust meds as indicated     #DMII  [PTA meds: metformin 1000 mg BID, glipizide 10 mg daily]  A1c 6.3%.  Patient reports compliance with medications, but that he was not taking his blood glucose at home as directed.    - Monitor blood glucose TID AC and HS.  BG currently 134-178  - Continue PTA metformin 1g BID  - PTA glipizide 10 mg held throughout inpatient admission.  BG slightly elevated but also with variable intake.  Resumed at low dose 2.5 mg daily 11/2 but with mild hypoglycemia (66), discontinued until intake improved.  - Hypoglycemia protocol  - Will need new glucometer and supplies, diabetes educator consulted    Cr uptrending  0.86 -> 1.03 -> 1.13 on 11/3.  Poor oral fluid intake, also recently increased dose of enalapril.  Patient agreeable to increasing oral fluids.  Improved.  - Encourage fluids  - Repeat BMP today with Cr stable at 0.94 today    Constipation  No BM for several days on admission, then with episode of emesis 10/31 and loose stools x2, now with recurrent constipation.   Receiving Miralax and Senokot-S.  Refused suppository 11/3.  Added prune juice.  Has refused suppository x2 days.  Feeling distended/full.  No recurrent nausea/vomiting.  Bowel sounds present, passing gas.  AXR on 11/5 with no significant colonic stool burden, non-obstructive bowel gas pattern.  Did have small BM on 11/5.  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  He notes decreased interest in food over the past year or so.  He has been sleeping poorly this admission.  Struggling with being away from family as well.  - Psych consult completed 11/5, appreciate assistance  - Remeron 7.5 mg started on 11/5 for mood, appetite, sleep.  Patient states that he is sleeping better.  Ongoing issues with depressed mood, but also with significant social stressors due to wife's illness.  Appetite remains impaired.      1. Adjustment to disability:  Psych consulted completed, Remeron trial started 11/5, continue to monitor.  2. FEN: regular diet, thin liquids  3. Bowel: continent, constipation as above, continue to monitor  4. Bladder: continent, PVRs at admission x3 <100, ok to monitor PRN only  5. DVT Prophylaxis: mechanical  6. GI Prophylaxis: PPI given DAPT + age  7. Code: full, confirmed on admission  8. Disposition: goal for home.  Per discussion with family over weekend, may consider transfer to TCU closer to home vs earlier discharge due to wife's critical illness.  As of today, patient expresses desire to  remain at ARU, but will continue discussion with therapy team, SW, psychology.  9. ELOS: 3 weeks  10. Follow up Appointments on Discharge: PCP, stroke and general neurology      Patient discussed with Dr. Fredis Ordonez, PM&R staff physician     Bozena Bower PA-C  Physical Medicine & Rehabilitation

## 2021-11-09 ENCOUNTER — APPOINTMENT (OUTPATIENT)
Dept: OCCUPATIONAL THERAPY | Facility: CLINIC | Age: 76
DRG: 056 | End: 2021-11-09
Attending: PHYSICAL MEDICINE & REHABILITATION
Payer: MEDICARE

## 2021-11-09 ENCOUNTER — APPOINTMENT (OUTPATIENT)
Dept: PHYSICAL THERAPY | Facility: CLINIC | Age: 76
DRG: 056 | End: 2021-11-09
Attending: PHYSICAL MEDICINE & REHABILITATION
Payer: MEDICARE

## 2021-11-09 LAB
GLUCOSE BLDC GLUCOMTR-MCNC: 134 MG/DL (ref 70–99)
GLUCOSE BLDC GLUCOMTR-MCNC: 146 MG/DL (ref 70–99)
GLUCOSE BLDC GLUCOMTR-MCNC: 151 MG/DL (ref 70–99)
GLUCOSE BLDC GLUCOMTR-MCNC: 166 MG/DL (ref 70–99)
GLUCOSE BLDC GLUCOMTR-MCNC: 95 MG/DL (ref 70–99)
METHYLMALONATE SERPL-SCNC: 0.26 UMOL/L (ref 0–0.4)

## 2021-11-09 PROCEDURE — 97530 THERAPEUTIC ACTIVITIES: CPT | Mod: GO

## 2021-11-09 PROCEDURE — 128N000003 HC R&B REHAB

## 2021-11-09 PROCEDURE — 97116 GAIT TRAINING THERAPY: CPT | Mod: GP

## 2021-11-09 PROCEDURE — 97535 SELF CARE MNGMENT TRAINING: CPT | Mod: GO

## 2021-11-09 PROCEDURE — 250N000013 HC RX MED GY IP 250 OP 250 PS 637: Performed by: PHYSICIAN ASSISTANT

## 2021-11-09 PROCEDURE — 97110 THERAPEUTIC EXERCISES: CPT | Mod: GP

## 2021-11-09 PROCEDURE — 99233 SBSQ HOSP IP/OBS HIGH 50: CPT | Performed by: PHYSICAL MEDICINE & REHABILITATION

## 2021-11-09 PROCEDURE — 97110 THERAPEUTIC EXERCISES: CPT | Mod: GO

## 2021-11-09 PROCEDURE — 97112 NEUROMUSCULAR REEDUCATION: CPT | Mod: GP

## 2021-11-09 RX ORDER — MIRTAZAPINE 7.5 MG/1
15 TABLET, FILM COATED ORAL AT BEDTIME
Status: DISCONTINUED | OUTPATIENT
Start: 2021-11-09 | End: 2021-11-24 | Stop reason: HOSPADM

## 2021-11-09 RX ADMIN — SENNOSIDES AND DOCUSATE SODIUM 1 TABLET: 8.6; 5 TABLET ORAL at 08:11

## 2021-11-09 RX ADMIN — METFORMIN HYDROCHLORIDE 1000 MG: 500 TABLET ORAL at 08:11

## 2021-11-09 RX ADMIN — ATORVASTATIN CALCIUM 40 MG: 40 TABLET, FILM COATED ORAL at 20:30

## 2021-11-09 RX ADMIN — INSULIN ASPART 1 UNITS: 100 INJECTION, SOLUTION INTRAVENOUS; SUBCUTANEOUS at 12:43

## 2021-11-09 RX ADMIN — METFORMIN HYDROCHLORIDE 1000 MG: 500 TABLET ORAL at 17:40

## 2021-11-09 RX ADMIN — MIRTAZAPINE 15 MG: 7.5 TABLET, FILM COATED ORAL at 20:30

## 2021-11-09 RX ADMIN — ASPIRIN 325 MG ORAL TABLET 325 MG: 325 PILL ORAL at 08:11

## 2021-11-09 RX ADMIN — POLYETHYLENE GLYCOL 3350 17 G: 17 POWDER, FOR SOLUTION ORAL at 08:11

## 2021-11-09 RX ADMIN — CLOPIDOGREL BISULFATE 75 MG: 75 TABLET, FILM COATED ORAL at 08:11

## 2021-11-09 RX ADMIN — SENNOSIDES AND DOCUSATE SODIUM 1 TABLET: 8.6; 5 TABLET ORAL at 20:30

## 2021-11-09 RX ADMIN — ENALAPRIL MALEATE 10 MG: 5 TABLET ORAL at 08:11

## 2021-11-09 RX ADMIN — PANTOPRAZOLE SODIUM 40 MG: 40 TABLET, DELAYED RELEASE ORAL at 06:40

## 2021-11-09 ASSESSMENT — ACTIVITIES OF DAILY LIVING (ADL)
ADLS_ACUITY_SCORE: 11

## 2021-11-09 ASSESSMENT — MIFFLIN-ST. JEOR: SCORE: 1590.35

## 2021-11-09 NOTE — PLAN OF CARE
FOCUS/GOAL  Bowel management, Bladder management, Pain management, Mobility, Skin integrity, and Safety management    ASSESSMENT, INTERVENTIONS AND CONTINUING PLAN FOR GOAL:  Patient is alert and oriented x 4. Denied pain, dizziness headache CP N/T or SOB. VS WDL. Regular/thin diet.  coverage per sliding scale before supper HS . A-1 with walker. Continent of bladder uses urinal. No BM scheduled senna administered. Patient take pills whole. No care concern at this time. We will continue per POC.

## 2021-11-09 NOTE — PLAN OF CARE
Pt A&O x4. A of 1 w/ walker. No complaints of pain. Continent. Able to have BM during shift. BGs were 134 and 166. Pt seemed in okay spirits during shift. Continue w/ plan of care.

## 2021-11-09 NOTE — PLAN OF CARE
Discharge Planner Post-Acute Rehab OT:      Discharge Plan: home with wife     Precautions: falls     Current Status:  ADLs:   G/H - standing/sitting EOS with Uwalker/4WW SBA with set up  U/b - seated SBA with set up, Min A w/long sleeve button up  L/B dressing - seated EOB with set up and stand don over hips close SBA, Min A on 11/9 to don underwear d/t getting caught on feet unable to correct w/out assist   Feet: seated, doff/don socks using cross leg tech  Shower: OT sba min cues for sequencing slower processing of shower task pt able to wash all areas  inclusing crossing leg over other to gt lowerr leg and feet and standing with cga  with rail for pericares from front and back  Tub/shower transfer: CGA     IADLs: TBA     Vision/Cognition: slums increased to 27/30  Will continue functional cog tasks     Assessment:   Pt needing increased assist to don long sleeve button up shirt today (Min A), and to don underwear as got caught on feet and unable to correct w/out assist. During pm session pt c/o very fatigued d/t busy day and did not have afternoon nap. Discussed energy conservation principles and general strategies for daily tasks. Pt agreeable to seated UB/core strengthening, & returned to bed after to rest.  Cont per POC.    -37 min total today d/t pt w/other staff and took several phone calls during tx time      Other Barriers to Discharge (DME, Family Training, etc): daughter in law PT pt has rolling walker and higher toilets at home. Tub bench or small shower stool depending on which bathroom pt decides to use.

## 2021-11-09 NOTE — PLAN OF CARE
"Discharge Planner Post-Acute Rehab PT:      Discharge Plan: Home with OP PT with discharge date anticipated 11/19     Precautions: Fall precaution     Current Status:  Bed Mobility: Ind  Transfer: CGA without device to complete stand pivot transfer  Gait: AMb >250 with U step walker and SBA. Intermittent freezing/festinating.  Stairs: 8 6\" steps with B railings and reciprocal pattern needing increased UE support for L step;  limited due to dizziness  Balance:   PASS:  22/36  Haas 18/56  Gait Speed: .19 m/s     Assessment: Pt w/ more fatigue today in  PM session. Reports he didn't get a chance to lay down after lunch. Gait w/ U step w/o freezing episodes but fesination present. Responds well to both visual laser cue provided by Ustep and VCs to \"stop and re-set\" stability prior to continuing. PM new step and balance training    Other Barriers to Discharge (DME, Family Training, etc): None  "

## 2021-11-09 NOTE — PROGRESS NOTES
"PA spoke with pt again this morning about staying at ARU vs going closer to home. See PA note.     SW received a call around 1:45pm today from pt son Nghia. Nghia asked SWer if SWer was able to talk to pt about about going closer to home and how it went calling facilities. SWer informed Nghia that the PA spoke with pt and pt continues to express interest in staying at ARU. SWer also reiterated to Nghia that SW is not going to call facilities unless pt give SW permission to do so. Nghia stated, \"my dad will want to come home as soon as he hears that his PCP wants him closer to home. If he is told that and can't come home, he will be devastated and not be able to focus\". SWer provided validation but explained HIPPA and decision-making. Pt son asked if it's possible to do a lateral transfer to another ARU in [place]. SWer discussed difficulty with that due to progress made here on ARU, insurance, bed avail and continued need for ARU by the time a transfer is initiated. Pt son requested that SWer talk to pt about the options again. Pt son stated that he would be present for team rounds meeting tomorrow.     SW met with pt at bedside. SW notified pt of conversation with pt son Corona yesterday morning and two phone calls from son Nghia. SWer asked pt about his thoughts RE: staying at ARU vs going closer to home. Pt continued to express interest in staying at ARU and that he has been in contact with his wife and things are 'stable'. SWer validated family concerns for pt to be closer to home and discussed pt being his own decision-maker. SWer discussed options for staying on ARU and D/Cing home with OP therapy (as planned/targeted), SNF closer to home and (little potential) to discharge to an ARU in [place] (per family preference). Pt expressed that the information was helpful to hear and understand. SW provided pt with a list of ARU and SNF in [place], in the case that pt changes his mind and encouraged pt to reach out to SWer if " needed. Pt continues to express desire to stay on ARU and denied additional SW needs or concerns at this time.     Will discuss this with pt son tomorrow during rounds.     SELENE Barrientos   [facility]   Direct Phone: [phone]  I   Pager: [phone]  I  Fax: [phone]

## 2021-11-09 NOTE — PROGRESS NOTES
[facility]   Acute Rehabilitation Unit  Daily progress note    INTERVAL HISTORY  Jose was seen and examined at bedside this morning.  No acute events reported overnight.  Family continues to express concerns about returning patient closer to home to be near wife who is quite ill.  However, patient expresses ongoing desire to remain at ARU to complete his rehab course.  Today, he states that he slept much better last night and that he was relieved to get good news about his wife's condition.  He notes he is feeling more energized today after significant fatigue yesterday, especially during AM sessions.  He feels mood is improved.  He continues to have poor appetite, though he does report to like Ensure supplements.  He denies any chest pain, palpitations, shortness of breath, dizziness or lightheadedness, bladder changes.  He has not had a bowel movement for several days, but states he feels like he will today.  He denies any nausea, abdominal pain, or vomiting.    Functionally, he is currently needing contact guard assist for stand pivot transfers, standby assist for ambulating up to 250' with U step walker.  He also needs standby assist for seated dressing and grooming/hygiene.  He is able to complete tub/shower transfer with contact guard assist with standby assist for a majority of shower cares and contact guard assist for pericares standing.    MEDICATIONS    aspirin  325 mg Oral Daily     atorvastatin  40 mg Oral QPM     clopidogrel  75 mg Oral Daily     enalapril  10 mg Oral Daily     influenza vac high-dose quad  0.7 mL Intramuscular Prior to discharge     insulin aspart  1-7 Units Subcutaneous TID AC     insulin aspart  1-5 Units Subcutaneous At Bedtime     metFORMIN  1,000 mg Oral BID w/meals     mirtazapine  7.5 mg Oral At Bedtime     pantoprazole  40 mg Oral QAM AC     polyethylene glycol  17 g Oral Daily     senna-docusate  1 tablet Oral BID        acetaminophen,  "bisacodyl, glucose **OR** dextrose **OR** glucagon, melatonin, senna-docusate     PHYSICAL EXAM  /70 (BP Location: Right arm)   Pulse 73   Temp 97.7  F (36.5  C) (Oral)   Resp 18   Ht 1.778 m (5' 10\")   Wt 84.1 kg (185 lb 6.4 oz)   SpO2 96%   BMI 26.60 kg/m     Gen: NAD, seated upright in chair  HEENT: NC/AT  Cardio: RRR + ectopic beats, no murmurs  Pulm: non-labored on room air, lungs CTA bilaterally  Abd: soft, non-tender, non-distended, bowel sounds present  Ext: no edema in bilateral lower extremities  Neuro/MSK: awake alert, follows commands, moving all 4 extremities    LABS  CBC RESULTS:   Recent Labs   Lab Test 11/08/21  0522 11/04/21  0646 11/01/21  0752   WBC 6.4 6.4 9.0   RBC 4.44 4.40 4.74   HGB 13.6 13.6 14.4   HCT 39.9* 39.1* 42.3   MCV 90 89 89   MCH 30.6 30.9 30.4   MCHC 34.1 34.8 34.0   RDW 11.9 11.8 11.9    150 169     Last Basic Metabolic Panel:  Recent Labs   Lab Test 11/09/21  0740 11/08/21 2122 11/08/21  1717 11/08/21  0743 11/08/21  0522 11/04/21  0907 11/04/21  0646 11/03/21  0732 11/03/21  0634   NA  --   --   --   --  136  --  136  --  139   POTASSIUM  --   --   --   --  4.3  --  4.4  --  4.1   CHLORIDE  --   --   --   --  105  --  104  --  106   CO2  --   --   --   --  26  --  25  --  29   ANIONGAP  --   --   --   --  5  --  7  --  4   * 164* 150*   < > 125*   < > 127*   < > 128*   BUN  --   --   --   --  21  --  16  --  16   CR  --   --   --   --  0.94  --  0.92  --  1.13   GFRESTIMATED  --   --   --   --  77  --  79  --  62   LEON  --   --   --   --  8.6  --  8.2*  --  8.3*    < > = values in this interval not displayed.       Rehabilitation - continue comprehensive acute inpatient rehabilitation program with multidisciplinary approach including therapies, rehab nursing, and physiatry following. See interval history for updates.      ASSESSMENT AND PLAN  Jose Mallory is a 76 year old right hand dominant male with a past medical history of DM2, HTN, HLD, GERD, " posterior fossa arachnoid cyst, peripheral neuropathy, prior R pontine stroke, gait instability, and bilateral knee replacement who was admitted on 10/26/21 with progressive lower extremity weakness and gait difficulties, found to have subacute left pontine stroke with course complicated by neuropathy.  He is now admitted to ARU on 10/29 for multidisciplinary rehabilitation and ongoing medical management.     #Subacute L pontine stroke  #Subacute to chronic R pontine stroke  #Gait instability  #Arachnoid cyst  #Moderate cervical canal stenosis with disc herniation most pronounced at C3-C4, C4-C5   MRI with subacute cerebral infarction in left ventral cookie.  TTE EF 60-65%, no significant valvular disease or cardiac source for embolus.  Telemetry with NSR and no evidence of atrial fibrillation.  IV tPA was not initiated due to unclear or unfavorable risk-benefit profile for extended window thrombolysis beyond the conventional 4.5 hour time window.  Etiology of stroke thought to be atherosclerotic. Gait instability suspected to be multifactorial including peripheral neuropathy, cerebellar disease, and recent bilateral pontine strokes.  - Continue DAPT with Aspirin 325 mg daily and Plavix 75 mg daily x90 days, then stop Plavix and continue Aspirin 325 mg daily only  - High intensity statin (atorvastatin 40 mg daily) to target LDL <70  - HbA1c at goal (6.3%)  - Long-term BP goal to 120/80  - PT noting festinating gait, very slow gait, intermittent freezing, cogwheeling, difficulty initiating motor tasks.  Consider neurology consult while remains at ARU to evaluate and potentially trial medication.  - Increased fatigue 11/8, no changes neuro exam.  Attributes to busy day yesterday, family stressors, but OT also noting balance not as good this morning.  Irregular HR, EKG emonstrates sinus rhythm with PACs.  Appears and feels more energized today.  - Continue PT/OT  - Outpatient sleep evaluation for SELIN  - Follow up with  neurology in 4-6 weeks  - Per hospital discharge summary, if gait has not improved after the pt's stay at acute rehab and/or by the time there is neurology follow up in the outpatient setting, please consider additional neurosurgery consultation.     #Peripheral neuropathy  #Decreased vibration sensation bilaterally in lower extremities  Per neurology, suspect that large fiber sensory dysfunction secondary to diabetes.  Vitamin B12/folate WNL, NEHA negative, HIV non reactive, RPR negative.  Copper, zinc WNL.  Protein electrophoresis with slightly elevated alpha 2, per pathologist, essentially normal, no obvious monoclonal proteins.  Homocysteine WNL, MMA WNL.  - Follow up with neurology for EMG     #HTN  [PTA enalapril 5mg PO daily]  Enalapril increased to 10 mg daily on 11/2 due to generally elevated BP.  - BP generally improved, though intermittently elevated.  Monitor need for further dose adjustments  - Continue to monitor BP, adjust meds as indicated     #DMII  [PTA meds: metformin 1000 mg BID, glipizide 10 mg daily]  A1c 6.3%.  Patient reports compliance with medications, but that he was not taking his blood glucose at home as directed.    - Monitor blood glucose TID AC and HS.  BG currently 134-164  - Continue PTA metformin 1g BID  - PTA glipizide 10 mg held throughout inpatient admission.  BG slightly elevated but also with variable intake.  Resumed at low dose 2.5 mg daily 11/2 but with mild hypoglycemia (66), discontinued until intake improved.  - Hypoglycemia protocol  - Will need new glucometer and supplies, diabetes educator consulted    Cr uptrending  0.86 -> 1.03 -> 1.13 on 11/3.  Poor oral fluid intake, also recently increased dose of enalapril. Patient agreeable to increasing oral fluids.  Improved.  - Encourage fluids  - Repeat BMP 11/8 with Cr stable at 0.94    Constipation  No BM for several days on admission, then with episode of emesis 10/31 and loose stools x2, now with recurrent constipation.    Receiving Miralax and Senokot-S.  Refused suppository 11/3.  Added prune juice.  Has refused suppository x2 days.  Feeling distended/full.  No recurrent nausea/vomiting.  Bowel sounds present, passing gas.  AXR on 11/5 with no significant colonic stool burden, non-obstructive bowel gas pattern.  Did have small BM on 11/5.  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  He notes decreased interest in food over the past year or so.  He has been sleeping poorly this admission.  Struggling with being away from family as well.  - Psych consult completed 11/5, appreciate assistance  - Remeron 7.5 mg started on 11/5 for mood, appetite, sleep.  Patient states that he is sleeping better.  Ongoing issues with depressed mood, but also with significant social stressors due to wife's illness.  Appetite remains impaired.  Increase to 15 mg daily today      1. Adjustment to disability:  Psych consulted completed, Remeron trial started 11/5, dose increased today, continue to monitor.  2. FEN: regular diet, thin liquids  3. Bowel: continent, constipation as above, continue to monitor  4. Bladder: continent, PVRs at admission x3 <100, ok to monitor PRN only  5. DVT Prophylaxis: mechanical  6. GI Prophylaxis: PPI given DAPT + age  7. Code: full, confirmed on admission  8. Disposition: goal for home.  Per discussion with family over weekend, may consider transfer to TCU closer to home vs earlier discharge due to wife's critical illness.  As of today, patient expresses desire to remain at ARU, but will continue discussion with therapy team, MAVIS, psychology.  9. ELOS: 3 weeks  10. Follow up Appointments on Discharge: PCP, stroke and general neurology      Patient discussed with Dr. Mcneal  Mery, PM&R staff physician     HAIR Riley-C  Physical Medicine & Rehabilitation

## 2021-11-09 NOTE — PLAN OF CARE
FOCUS/GOAL  Medical management    ASSESSMENT, INTERVENTIONS AND CONTINUING PLAN FOR GOAL:  Patient expressed feeling of sadness earlier in the shift, was sleeping poorly, fell asleep around 0200, continued to sleep until awakened around 0630 for medication; his mood was WDL, interacting well with staff, no c/o pain, no c/o anything else. He stated he slept well after 0200. He used a urinal independently. He is independent with bed mobility, turning from side to side.

## 2021-11-10 ENCOUNTER — APPOINTMENT (OUTPATIENT)
Dept: PHYSICAL THERAPY | Facility: CLINIC | Age: 76
DRG: 056 | End: 2021-11-10
Attending: PHYSICAL MEDICINE & REHABILITATION
Payer: MEDICARE

## 2021-11-10 ENCOUNTER — APPOINTMENT (OUTPATIENT)
Dept: OCCUPATIONAL THERAPY | Facility: CLINIC | Age: 76
DRG: 056 | End: 2021-11-10
Attending: PHYSICAL MEDICINE & REHABILITATION
Payer: MEDICARE

## 2021-11-10 LAB
GLUCOSE BLDC GLUCOMTR-MCNC: 124 MG/DL (ref 70–99)
GLUCOSE BLDC GLUCOMTR-MCNC: 132 MG/DL (ref 70–99)
GLUCOSE BLDC GLUCOMTR-MCNC: 137 MG/DL (ref 70–99)
GLUCOSE BLDC GLUCOMTR-MCNC: 158 MG/DL (ref 70–99)
GLUCOSE BLDC GLUCOMTR-MCNC: 175 MG/DL (ref 70–99)

## 2021-11-10 PROCEDURE — 99233 SBSQ HOSP IP/OBS HIGH 50: CPT | Performed by: PHYSICAL MEDICINE & REHABILITATION

## 2021-11-10 PROCEDURE — 250N000013 HC RX MED GY IP 250 OP 250 PS 637: Performed by: PHYSICIAN ASSISTANT

## 2021-11-10 PROCEDURE — 97116 GAIT TRAINING THERAPY: CPT | Mod: GP | Performed by: PHYSICAL THERAPIST

## 2021-11-10 PROCEDURE — 97110 THERAPEUTIC EXERCISES: CPT | Mod: GO

## 2021-11-10 PROCEDURE — 97750 PHYSICAL PERFORMANCE TEST: CPT | Mod: GP | Performed by: PHYSICAL THERAPIST

## 2021-11-10 PROCEDURE — 97112 NEUROMUSCULAR REEDUCATION: CPT | Mod: GP | Performed by: PHYSICAL THERAPIST

## 2021-11-10 PROCEDURE — 97530 THERAPEUTIC ACTIVITIES: CPT | Mod: GP | Performed by: PHYSICAL THERAPIST

## 2021-11-10 PROCEDURE — 97530 THERAPEUTIC ACTIVITIES: CPT | Mod: GO

## 2021-11-10 PROCEDURE — 128N000003 HC R&B REHAB

## 2021-11-10 PROCEDURE — 90832 PSYTX W PT 30 MINUTES: CPT | Performed by: PSYCHOLOGIST

## 2021-11-10 PROCEDURE — 999N000125 HC STATISTIC PATIENT MED CONFERENCE < 30 MIN

## 2021-11-10 PROCEDURE — 999N000150 HC STATISTIC PT MED CONFERENCE < 30 MIN: Performed by: PHYSICAL THERAPIST

## 2021-11-10 PROCEDURE — 97535 SELF CARE MNGMENT TRAINING: CPT | Mod: GO

## 2021-11-10 RX ADMIN — SENNOSIDES AND DOCUSATE SODIUM 1 TABLET: 8.6; 5 TABLET ORAL at 08:01

## 2021-11-10 RX ADMIN — METFORMIN HYDROCHLORIDE 1000 MG: 500 TABLET ORAL at 17:04

## 2021-11-10 RX ADMIN — MIRTAZAPINE 15 MG: 7.5 TABLET, FILM COATED ORAL at 20:34

## 2021-11-10 RX ADMIN — METFORMIN HYDROCHLORIDE 1000 MG: 500 TABLET ORAL at 08:01

## 2021-11-10 RX ADMIN — SENNOSIDES AND DOCUSATE SODIUM 1 TABLET: 8.6; 5 TABLET ORAL at 20:34

## 2021-11-10 RX ADMIN — ATORVASTATIN CALCIUM 40 MG: 40 TABLET, FILM COATED ORAL at 20:34

## 2021-11-10 RX ADMIN — ENALAPRIL MALEATE 10 MG: 5 TABLET ORAL at 08:02

## 2021-11-10 RX ADMIN — CLOPIDOGREL BISULFATE 75 MG: 75 TABLET, FILM COATED ORAL at 08:01

## 2021-11-10 RX ADMIN — POLYETHYLENE GLYCOL 3350 17 G: 17 POWDER, FOR SOLUTION ORAL at 08:02

## 2021-11-10 RX ADMIN — ASPIRIN 325 MG ORAL TABLET 325 MG: 325 PILL ORAL at 08:02

## 2021-11-10 RX ADMIN — PANTOPRAZOLE SODIUM 40 MG: 40 TABLET, DELAYED RELEASE ORAL at 06:14

## 2021-11-10 ASSESSMENT — ACTIVITIES OF DAILY LIVING (ADL)
ADLS_ACUITY_SCORE: 11

## 2021-11-10 ASSESSMENT — MIFFLIN-ST. JEOR: SCORE: 1586.72

## 2021-11-10 NOTE — PLAN OF CARE
FOCUS/GOAL  Medical management    ASSESSMENT, INTERVENTIONS AND CONTINUING PLAN FOR GOAL:  Pt denies any pain/discomfort. VSS, BG this shift 96 & 151. Adequate appetite noted... fluids encouraged. Continent of b/b. Voiding without any difficult. Last bm today. No new concerns. Call light at reach.

## 2021-11-10 NOTE — PROGRESS NOTES
[facility]   Acute Rehabilitation Unit  Daily progress note    INTERVAL HISTORY  Jose was seen and examined at bedside this morning during team rounds.  No acute events reported overnight.  Patient's son is at bedside this morning.  Patient is happy to report that his wife remains stable.  He states that he had some urinary frequency overnight last night, up about 5 times to urinate.  He denies dysuria, urgency, abdominal pain, fever/chills.  He did have a bowel movement yesterday after several days without.  He feels intake has improved slightly.    Functionally, he has made significant improvements with balance, improving PHAM score from 18 to 27!  U step walker seems to be helpful in working on gait.  His seated balance and level of needed assist for dressing varies pending fatigue.  He is progressing transfers from lower surfaces.  At this time, both him and his family agree that this remains the best place for his ongoing rehab, and he hopes to continue to expected discharge date of 11/19 here.  Did review that if his wife's situation deteriorates and patient expresses desire to go closer to home, would likely work to coordinate meds, family training, equipment, and follow-up for discharge to home, or would need to look at TCU closer to home.  For full functional updates, see team rounds note from today.    MEDICATIONS    aspirin  325 mg Oral Daily     atorvastatin  40 mg Oral QPM     clopidogrel  75 mg Oral Daily     enalapril  10 mg Oral Daily     influenza vac high-dose quad  0.7 mL Intramuscular Prior to discharge     insulin aspart  1-7 Units Subcutaneous TID AC     insulin aspart  1-5 Units Subcutaneous At Bedtime     metFORMIN  1,000 mg Oral BID w/meals     mirtazapine  15 mg Oral At Bedtime     pantoprazole  40 mg Oral QAM AC     polyethylene glycol  17 g Oral Daily     senna-docusate  1 tablet Oral BID        acetaminophen, bisacodyl, glucose **OR** dextrose **OR**  "glucagon, melatonin, senna-docusate     PHYSICAL EXAM  BP (!) 141/79 (BP Location: Left arm)   Pulse 91   Temp 96.8  F (36  C) (Oral)   Resp 20   Ht 1.778 m (5' 10\")   Wt 86.4 kg (190 lb 8 oz)   SpO2 95%   BMI 27.33 kg/m     Gen: NAD, seated upright in chair  HEENT: NC/AT  Cardio: appears well-perfused  Pulm: non-labored on room air  Abd: non-distended  Ext: no edema in bilateral lower extremities  Neuro/MSK: awake alert, follows commands, moving all 4 extremities    LABS  CBC RESULTS:   Recent Labs   Lab Test 11/08/21 0522 11/04/21  0646 11/01/21  0752   WBC 6.4 6.4 9.0   RBC 4.44 4.40 4.74   HGB 13.6 13.6 14.4   HCT 39.9* 39.1* 42.3   MCV 90 89 89   MCH 30.6 30.9 30.4   MCHC 34.1 34.8 34.0   RDW 11.9 11.8 11.9    150 169     Last Basic Metabolic Panel:  Recent Labs   Lab Test 11/10/21  0728 11/09/21 2119 11/09/21 2027 11/08/21  0743 11/08/21 0522 11/04/21  0907 11/04/21  0646 11/03/21  0732 11/03/21  0634   NA  --   --   --   --  136  --  136  --  139   POTASSIUM  --   --   --   --  4.3  --  4.4  --  4.1   CHLORIDE  --   --   --   --  105  --  104  --  106   CO2  --   --   --   --  26  --  25  --  29   ANIONGAP  --   --   --   --  5  --  7  --  4   * 146* 151*   < > 125*   < > 127*   < > 128*   BUN  --   --   --   --  21  --  16  --  16   CR  --   --   --   --  0.94  --  0.92  --  1.13   GFRESTIMATED  --   --   --   --  77  --  79  --  62   LEON  --   --   --   --  8.6  --  8.2*  --  8.3*    < > = values in this interval not displayed.       Rehabilitation - continue comprehensive acute inpatient rehabilitation program with multidisciplinary approach including therapies, rehab nursing, and physiatry following. See interval history for updates.      ASSESSMENT AND PLAN  Josekimberli Mallory is a 76 year old right hand dominant male with a past medical history of DM2, HTN, HLD, GERD, posterior fossa arachnoid cyst, peripheral neuropathy, prior R pontine stroke, gait instability, and bilateral knee " replacement who was admitted on 10/26/21 with progressive lower extremity weakness and gait difficulties, found to have subacute left pontine stroke with course complicated by neuropathy.  He is now admitted to ARU on 10/29 for multidisciplinary rehabilitation and ongoing medical management.     #Subacute L pontine stroke  #Subacute to chronic R pontine stroke  #Gait instability  #Arachnoid cyst  #Moderate cervical canal stenosis with disc herniation most pronounced at C3-C4, C4-C5   MRI with subacute cerebral infarction in left ventral cookie.  TTE EF 60-65%, no significant valvular disease or cardiac source for embolus.  Telemetry with NSR and no evidence of atrial fibrillation.  IV tPA was not initiated due to unclear or unfavorable risk-benefit profile for extended window thrombolysis beyond the conventional 4.5 hour time window.  Etiology of stroke thought to be atherosclerotic. Gait instability suspected to be multifactorial including peripheral neuropathy, cerebellar disease, and recent bilateral pontine strokes.  - Continue DAPT with Aspirin 325 mg daily and Plavix 75 mg daily x90 days, then stop Plavix and continue Aspirin 325 mg daily only  - High intensity statin (atorvastatin 40 mg daily) to target LDL <70  - HbA1c at goal (6.3%)  - Long-term BP goal to 120/80  - PT noting festinating gait, very slow gait, intermittent freezing, cogwheeling, difficulty initiating motor tasks.  Consider neurology consult while remains at ARU to evaluate and potentially trial medication.  - Continue PT/OT  - Outpatient sleep evaluation for SELIN  - Follow up with neurology in 4-6 weeks  - Per hospital discharge summary, if gait has not improved after the pt's stay at acute rehab and/or by the time there is neurology follow up in the outpatient setting, please consider additional neurosurgery consultation.     #Peripheral neuropathy  #Decreased vibration sensation bilaterally in lower extremities  Per neurology, suspect that  large fiber sensory dysfunction secondary to diabetes.  Vitamin B12/folate WNL, NEHA negative, HIV non reactive, RPR negative.  Copper, zinc WNL.  Protein electrophoresis with slightly elevated alpha 2, per pathologist, essentially normal, no obvious monoclonal proteins.  Homocysteine WNL, MMA WNL.  - Follow up with neurology for EMG     #HTN  [PTA enalapril 5mg PO daily]  Enalapril increased to 10 mg daily on 11/2 due to generally elevated BP.  - BP generally improved  - Continue to monitor BP, adjust meds as indicated     #DMII  [PTA meds: metformin 1000 mg BID, glipizide 10 mg daily]  A1c 6.3%.  Patient reports compliance with medications, but that he was not taking his blood glucose at home as directed.    - Monitor blood glucose TID AC and HS.  BG currently   - Continue PTA metformin 1g BID  - PTA glipizide 10 mg held throughout inpatient admission.  BG slightly elevated but also with variable intake.  Resumed at low dose 2.5 mg daily 11/2 but with mild hypoglycemia (66), discontinued until intake improved.  - Hypoglycemia protocol  - Will need new glucometer and supplies, diabetes educator consulted    Cr uptrending  0.86 -> 1.03 -> 1.13 on 11/3.  Poor oral fluid intake, also recently increased dose of enalapril. Patient agreeable to increasing oral fluids.  Improved.  - Encourage fluids  - Repeat BMP 11/8 with Cr stable at 0.94    Constipation  No BM for several days on admission, then with episode of emesis 10/31 and loose stools x2, now with recurrent constipation.   Receiving Miralax and Senokot-S.  Refused suppository 11/3.  Added prune juice.  Has refused suppository x2 days.  Feeling distended/full.  No recurrent nausea/vomiting.  Bowel sounds present, passing gas.  AXR on 11/5 with no significant colonic stool burden, non-obstructive bowel gas pattern.  Did have small BM on 11/5.  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  He notes decreased interest in food over the past year or so.  He has been sleeping poorly this admission.  Struggling with being away from family as well.  - Psych consult completed 11/5, appreciate assistance  - Remeron 7.5 mg started on 11/5 for mood, appetite, sleep.  Patient states that he is sleeping better.  Ongoing issues with depressed mood, but also with significant social stressors due to wife's illness.  Appetite remains impaired.  Increased to 15 mg daily on 11/9.  RD noting weight stabilized, intake improved.        1. Adjustment to disability:  Psych consulted completed, Remeron trial started 11/5, dose increased 11/9, continue to monitor.  2. FEN: regular diet, thin liquids  3. Bowel: continent, constipation as above, continue to monitor  4. Bladder: continent, PVRs at admission x3 <100, ok to monitor PRN only.  Nocturia 11/9-11/10, no other urinary sx.  Continue to monitor.  5. DVT Prophylaxis: mechanical  6. GI Prophylaxis: PPI given DAPT + age  7. Code: full, confirmed on admission  8. Disposition: goal for home.  Per discussion with family over weekend, may consider transfer to TCU closer to home vs earlier discharge due to wife's critical illness.  As of today, patient continues to express desire to remain at ARU for full rehab course, but will continue discussion with therapy team, SW, psychology to prepare for sooner discharge if needed.  9. ELOS: 3 weeks  10. Follow up Appointments on Discharge: PCP, stroke and general neurology      I, Bozena Bower, spent a total of 35 minutes face-to-face or managing the care of Jose Mallory. Over 50% of my time on the unit was spent counseling the patient and coordinating care. See note for details.       Patient seen and discussed with Dr. Fredis Ordonez, PM&R  staff physician     HAIR Riley-C  Physical Medicine & Rehabilitation

## 2021-11-10 NOTE — PLAN OF CARE
Problem: Goal/Outcome  Goal: Goal Outcome Summary  Outcome: Improving   FOCUS/GOAL  Medical management and Mobility    ASSESSMENT, INTERVENTIONS AND CONTINUING PLAN FOR GOAL:  Pt's vitals and BGs stable. No need for correction novolog. Pt using the call light appropriately for assist. Denied pain. CGA with walker for transfers and walking in room. Pt awar of meds when reviewed with him this morning. Recommended to use a medbox at home because pt has some forgetfulness. Blanchable erythema in sacral coccyx area after sitting on chair for whole morning. Encouraged to shift frequently.

## 2021-11-10 NOTE — PROGRESS NOTES
11/10/21 0937   Signing Clinician's Name / Credentials   Signing clinician's name / credentials Anne-Marie Siddiqui, PT   Pham Balance Scale (PHAM K, GIL S, IRIS SALINAS, MACEY JORGENSEN: MEASURING BALANCE IN THE ELDERLY: VALIDATION OF AN INSTRUMENT. CAN. J. PUB. HEALTH, JULY/AUGUST SUPPLEMENT 2:S7-11, 1992.)   Sit To Stand 2   Standing Unsupported 4   Sitting Unsupported 4   Stand to Sit 2   Transfers 2   Standing with Eyes Closed 4   Standing Unsupported, Feet Together 3   Reach Forward With Outstretched Arm 2   Retrieve Object From Floor 3   Turning to Look Behind 3   Turn 360 Degrees 1   Placing Alternate Foot on Stool (4-6 inches) 3   Unsupported Tandem Stand (Demonstrate to Subject) 3   One Leg Stand 1   Total Score (A score of 45 or less has been correlated with an increased risk of falls)   Total Score (out of 56) 37   Pham Balance Scale (BBS) Cutoff Scores for CVA Population:  Patient Score: 37/56    The BBS is a measure of static and dynamic standing balance that has been validated in community dwelling elderly individuals and individuals who have Parkinson's Disease, MS, and those who are s/p CVA and TBI. The test is administered without an assistive device. Scores from the Pham are used to determine the probability of falling based on the patient's previous history of falls and their test performance.     0-20 High risk for falling- Corresponded with w/c bound status  21-40 Medium risk for falling- Able to walk with assistance  41-56 Low risk for falling- Able to walk independently  According to [facility].  Available at http://www.strokecenter.org/.  Accessibility verified April 10, 2013.  Minimal Detectable Change = 6.5 according to Jose A & Yarieley 2008    Assessment (rationale for performing, application to patient s function & care plan): Continue w/ use of walker and assist for ambulation, consider ind bed<>chair  Minutes billed as physical performance test: 25

## 2021-11-10 NOTE — CARE CONFERENCE
Acute Rehab Care Conference/Team Rounds      Type: Team Rounds    Present:  Dr Fredis Mathur, Bozena Bower PA, Celestina Lynn RN, Anne-Marie Siddiqui PT, Iris Marr OT, Mabel Mckoy [facility], Ynes Emery Dietician, Patient Jose Mallory and son Nghia.       Discharge Barriers/Treatment/Education    Rehab Diagnosis: Stroke Ischemic 01.2 (R) Body Involvement (L) Brain: L pontine stroke    Active Medical Co-morbidities/Prognosis:   Patient Active Problem List   Diagnosis     Gait difficulty     Weakness     Left pontine stroke (H)       Safety: Patient calls for assistance as needed and communicates his needs. He needs assistance of one with a walker for transfers and ambulate. No alarm per his white board, needs an order to discharge alarms. He slept poorly due to frequent use of urinal.    Pain: Denies.    Medications, Skin, Tubes/Lines: Pt familiar with current meds. Nursing to continue reviewing during stay. He takes pills whole with water. Skin is intact. No tubes/lines.     Swallowing/Nutrition:    Bowel/Bladder: Continent of B/B, used urinal independently and frequently, no indication of UTI, urine output ranged from 150 to 250 ml. LBM 11/10/21, uses toilet.    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 All basic needs met on main level. No MH, SA or financial concerns reported. Support from wife and adult children, DIL is a PT.     ADLs/IADLs:ADLs:   G/H - standing/sitting EOS with Uwalker/4WW SBA with set up  U/b - seated SBA with set up, Min A w/long sleeve button up  L/B dressing - seated EOB with set up and stand don over hips close SBA, Min A on 11/9 to don underwear d/t getting caught on feet unable to correct w/out assist   Feet: seated, doff/don socks using cross leg tech  Shower: OT sba min cues for sequencing slower processing of shower task pt able to wash all areas  inclusing crossing leg over other to gt lowerr leg and feet and standing with cga  with rail for  pericares from front and back  Tub/shower transfer: CGA       Mobility: Up in room w/ walker and SBA; using U step walker for gait at this time; Haas score improved from 18/56 to 37/56 over course of IP care.  Recommend ongoing IP services at this time to reach goals of household mobility w/o assist.     Cognition/Language:    Community Re-Entry: U step vs 4ww    Transportation: Not a  due to new CVA; car transfer not a barrier    Decision maker: self    Plan of Care and goals reviewed and updated.    Discharge Plan/Recommendations    Fall Precautions: continue    Patient/Family input to goals: Yes    Anticipated rehab needs following discharge: Home with family    Anticipated care giver support after discharge: Family    Estimated length of stay: 11/19/21    Overall plan for the patient: Continue IP Rehabilitation.       Utilization Review and Continued Stay Justification    Medical Necessity Criteria:    For any criteria that is not met, please document reason and plan for discharge, transfer, or modification of plan of care to address.    Requires intensive rehabilitation program to treat functional deficits?: Yes    Requires 3x per week or greater involvement of rehabilitation physician to oversee rehabilitation program?: Yes    Requires rehabilitation nursing interventions?: Yes    Patient is making functional progress?: Yes    There is a potential for additional functional progress? Yes    Patient is participating in therapy 3 hours per day a minimum of 5 days per week or 15 hours per week in 7 day period?:Yes    Has discharge needs that require coordinated discharge planning approach?:Yes          Final Physician Sign off    Statement of Approval: I approve the plan of care.     Patient Goals  Social Work Goals: Confirm discharge recommendations with therapy, coordinate safe discharge plan and remain available to support and assist as needed.       OT Frequency: ot increased stay to 21 days to  11-  OT goal: hygiene/grooming: modified independent  OT goal: upper body dressing: Modified independent  OT goal: lower body dressing: Modified independent  OT goal: upper body bathing: Modified independent  OT goal: lower body bathing: Modified independent  OT goal: bed mobility: Modified independent  OT Goal: transfer: Modified independent  OT goal: toilet transfer/toileting: Modified independent  OT goal: meal preparation: Supervision/stand-by assist  OT goal: home management: Supervision/stand-by assist           OT goal 1: pt will be mod I hep for b u/e  OT goal 2: pt will be sba tub/shower transfer                PT Frequency: daily x 90 minutes  PT goal: bed mobility: Independent  PT goal: transfers: Modified independent  PT goal: gait: Modified independent,Greater than 200 feet,Rolling walker  PT goal: stairs: 3 stairs,Modified independent,Rail on both sides  PT goal: perform aerobic activity with stable cardiovascular response: continuous activity,20 minutes,NuStep     PT goal 1: Pt will demonstrate Ind car transfer for safe community mobility.  PT Goal 2: Pt will be able to complete full flight of stairs with 1 railing at mod I level for access to basement  PT Goal 3: Pt will demonstrate abiltiy to performed HEP safely for continued progress between therapies.  PT Goal 4: fall recovery w/ furniture assist                                                                   Goal: Medical Management: Patient will attend Stroke PLC prior to discharge.  Goal: Safety Management: Patient will utilize call light and wait for staff prior to transferring until made Mod I or Ind.  Goal: Falls: Patient will be free from falls while on ARU.

## 2021-11-10 NOTE — PROGRESS NOTES
Team rounds this morning, son Nghia at bedside. On track to discharge home Fri 11/19. OP anticipated but may switch to HC pending if wife is at home or still in the hospital. Will f/u with therapy next week and set up HC if needed. Pt son asked about backup plan for [place] if needed, SWer discussed and reiterated process with team present in room. No immediate SW needs at this time, SW will remain available and continue to follow.     SELENE Barrientos   [facility]   Direct Phone: [phone]  I   Pager: [phone]  I  Fax: [phone]

## 2021-11-10 NOTE — CONSULTS
Health Psychology                  Clinic    Department of Medicine  Mleony Polk, Ph.D., L.P. [phone]                          [facility]  [facility] Kristi Loja, Ph.D.,  L.P. [phone]                 3rd Floor  [place] Mail Code 927   Mary Mcghee, Ph.D., L.P. [phone]     [address] Lora Kwan, Ph.D., L.P. [phone]            [place] [address]          Grayson Bledsoe, Ph.D., A.MACEY.LATHA., L.P. [phone]      Nanette Fischer, Ph.D., L.P. [phone]      Inpatient Health Psychology Consultation*    DOS: 11/10/2021    Clinical Information:  Jose Mallory is a 76-year-old man currently [facility] the [facility] following gradual increase in gait difficulties that has been present for several years, but rapidly worsened over the week or 2 prior to his hospitalization on 10/26/2021.  Mr. Ocasio has been experiencing some emotional reactions that appear to fit a diagnosis of adjustment disorder.  Mr Mallory continues to report a sense of positive progress in function and within his rehabilitation therapies. He remains very pleased with that and with the care he is receiving. He reports on his surprise that his son, Nghia, arrived today and will remain in the area until Mr Mallory discharges; Nghia is able to work remotely from the [facility] and is able to stay with friends. Mr Mallory describes that his son seemed impressed and pleased with the progress that he has made during his ARC admission.  Mr Mallory is in direct contact with his wife, who remains hospitalized in [place]. He is aware of her situation and they speak frequently.   Some focus on recent conflict with his sister-in-law and how he plans to navigate that to assure that it does not become an obstacle to typical, positive family connections. Notes that his sister-in-law sent an apology and he  appreciates that.  Requests that we have additional contact through this ARC admission. He is aware that I will be out for a few days and that we will meet next week.  Assessment: Mr Mallory remains very motivated and focused on continuing to make positive progress in therapies. Concerned about his wife but also aware that she is getting the care that she needs and that he needs to focus on his own health and rehabilitation.  Diagnosis:  Adjustment disorder with mixed depression and anxiety (F43.23).  Recommendations/Plan: I will plan to meet with Mr Mallory next week.  Time Spent with Patient: 36 minutes (In: 1245 Out: 1321)  Service Provided: Individual psychotherapy   Provider: Melony Polk, Ph.D, LP

## 2021-11-10 NOTE — PROGRESS NOTES
Pt son Nghia called this writer, thanked Denton for the team rounds this morning and expressed how helpful it was. Nghia asked what would need to take place if pt were to discharge home to [facility]. Denton reiterated to pt son that it would have to be pt decision to leave. Denton discussed that the MD would need to determine if pt is medically ready vs signing out AMA. The therapist would want to discuss care/support/recommendations, possibly do session of family training, discuss/get equipment and set up OP vs HC (pending therapy recommendations). The team would want to set up f/u apts and get medications for discharge. SWer notified son to discuss this with pt and to notify bedside RN or MD/PA first to discuss. Pt son expressed appreciation, denied additional SW needs.     SELENE Barrientos   [facility]   Direct Phone: [phone]  I   Pager: [phone]  I  Fax: [phone]

## 2021-11-10 NOTE — PROGRESS NOTES
CLINICAL NUTRITION SERVICES - REASSESSMENT NOTE     Nutrition Prescription    RECOMMENDATIONS FOR MDs/PROVIDERS TO ORDER:  None today - pt reviewed face to face during team rounds    Malnutrition Status:    Patient does not meet two of the established criteria necessary for diagnosing malnutrition but is at risk for malnutrition    Recommendations already ordered by Registered Dietitian (RD):  None today - continue supplement as ordered     Future/Additional Recommendations:  Continue to monitor meal intakes, supplement acceptance, weight and lab trends        EVALUATION OF THE PROGRESS TOWARD GOALS   Diet: Regular  Supplement: Chocolate or vanilla at breakfast + PRN as pt requests (pt can request additional servings, NSA please ask pt if pt wants additional servings when obtaining meal orders)  Intake: % per flow sheets        NEW FINDINGS   MAR reviewed, noted pt started on Remeron on 11/9. Labs reviewed. Pt reviewed during care team rounds and visited at bedside, weight trends improving, po intakes improving, pt was encouraged to take Ensure supplement and consume small amount of excess stock in room.     11/09/21 1517 86.4 kg (190 lb 8 oz)   11/03/21 0654 84.1 kg (185 lb 6.4 oz)   11/02/21 0930 84 kg (185 lb 3.2 oz)   11/01/21 0415 83.9 kg (184 lb 14.4 oz)   10/29/21 1600 88.3 kg (194 lb 9.6 oz)     10/27/21 85.7 kg (189 lb)   10/26/21 85.8 kg (189 lb 2.5 oz)      89.8 kg (198 lb) 09/23/2021 - per CE     90.2 kg (198 lb 11.9 oz) 04/27/2021 - per CE     Weight assessment: 5 lb wt gain in 1 week vs 4 lb wt loss from ARU admission, non-significant 4% weight loss in >1 month and >6 months.     MALNUTRITION  % Intake: Decreased intake does not meet criteria  % Weight Loss: Weight loss does not meet criteria  Subcutaneous Fat Loss: Facial region: mild vs age related   Muscle Loss: Posterior calf: mild vs age related   Fluid Accumulation/Edema: None noted  Malnutrition Diagnosis: Patient does not meet two of the  established criteria necessary for diagnosing malnutrition but is at risk for malnutrition    Previous Goals   Patient to consume % of nutritionally adequate meal trays TID, or the equivalent with supplements/snacks  Evaluation: Improving     Previous Nutrition Diagnosis  Inadequate energy intake related to variable/decreased appetite as evidenced by pt report and -6.7% significant weight loss x 1 month  Evaluation: Improving, diagnosis changed to below     CURRENT NUTRITION DIAGNOSIS  Predicted inadequate nutrient intake related to decreased, but improved appetite as evidenced by pt reports and weight trends       INTERVENTIONS  Implementation  Medical food supplement therapy - continue as ordered     Goals  Patient to consume % of nutritionally adequate meal trays TID, or the equivalent with supplements/snacks.    Monitoring/Evaluation  Progress toward goals will be monitored and evaluated per protocol.    Ynes Emery RD, CNSC, LD  ARU RD pager: [phone]    Addendum 11/12: NEL adjusted supplement order to PRN as  assistance reported excess stock in pt's room.

## 2021-11-11 ENCOUNTER — APPOINTMENT (OUTPATIENT)
Dept: OCCUPATIONAL THERAPY | Facility: CLINIC | Age: 76
DRG: 056 | End: 2021-11-11
Attending: PHYSICAL MEDICINE & REHABILITATION
Payer: MEDICARE

## 2021-11-11 ENCOUNTER — APPOINTMENT (OUTPATIENT)
Dept: PHYSICAL THERAPY | Facility: CLINIC | Age: 76
DRG: 056 | End: 2021-11-11
Attending: PHYSICAL MEDICINE & REHABILITATION
Payer: MEDICARE

## 2021-11-11 LAB
ANION GAP SERPL CALCULATED.3IONS-SCNC: 1 MMOL/L (ref 3–14)
BUN SERPL-MCNC: 14 MG/DL (ref 7–30)
CALCIUM SERPL-MCNC: 8.7 MG/DL (ref 8.5–10.1)
CHLORIDE BLD-SCNC: 106 MMOL/L (ref 94–109)
CO2 SERPL-SCNC: 28 MMOL/L (ref 20–32)
CREAT SERPL-MCNC: 1.02 MG/DL (ref 0.66–1.25)
ERYTHROCYTE [DISTWIDTH] IN BLOOD BY AUTOMATED COUNT: 11.9 % (ref 10–15)
GFR SERPL CREATININE-BSD FRML MDRD: 70 ML/MIN/1.73M2
GLUCOSE BLD-MCNC: 113 MG/DL (ref 70–99)
GLUCOSE BLDC GLUCOMTR-MCNC: 113 MG/DL (ref 70–99)
GLUCOSE BLDC GLUCOMTR-MCNC: 119 MG/DL (ref 70–99)
GLUCOSE BLDC GLUCOMTR-MCNC: 126 MG/DL (ref 70–99)
GLUCOSE BLDC GLUCOMTR-MCNC: 138 MG/DL (ref 70–99)
GLUCOSE BLDC GLUCOMTR-MCNC: 165 MG/DL (ref 70–99)
HCT VFR BLD AUTO: 41.7 % (ref 40–53)
HGB BLD-MCNC: 13.8 G/DL (ref 13.3–17.7)
MCH RBC QN AUTO: 29.9 PG (ref 26.5–33)
MCHC RBC AUTO-ENTMCNC: 33.1 G/DL (ref 31.5–36.5)
MCV RBC AUTO: 90 FL (ref 78–100)
PLATELET # BLD AUTO: 152 10E3/UL (ref 150–450)
POTASSIUM BLD-SCNC: 4.7 MMOL/L (ref 3.4–5.3)
RBC # BLD AUTO: 4.62 10E6/UL (ref 4.4–5.9)
SODIUM SERPL-SCNC: 135 MMOL/L (ref 133–144)
WBC # BLD AUTO: 6.3 10E3/UL (ref 4–11)

## 2021-11-11 PROCEDURE — 36415 COLL VENOUS BLD VENIPUNCTURE: CPT | Performed by: PHYSICIAN ASSISTANT

## 2021-11-11 PROCEDURE — 97530 THERAPEUTIC ACTIVITIES: CPT | Mod: GO

## 2021-11-11 PROCEDURE — 97530 THERAPEUTIC ACTIVITIES: CPT | Mod: GP | Performed by: PHYSICAL THERAPIST

## 2021-11-11 PROCEDURE — 250N000013 HC RX MED GY IP 250 OP 250 PS 637: Performed by: PHYSICIAN ASSISTANT

## 2021-11-11 PROCEDURE — 97535 SELF CARE MNGMENT TRAINING: CPT | Mod: GO

## 2021-11-11 PROCEDURE — 80048 BASIC METABOLIC PNL TOTAL CA: CPT | Performed by: PHYSICIAN ASSISTANT

## 2021-11-11 PROCEDURE — 99233 SBSQ HOSP IP/OBS HIGH 50: CPT | Performed by: PHYSICAL MEDICINE & REHABILITATION

## 2021-11-11 PROCEDURE — 97110 THERAPEUTIC EXERCISES: CPT | Mod: GP | Performed by: PHYSICAL THERAPIST

## 2021-11-11 PROCEDURE — 85027 COMPLETE CBC AUTOMATED: CPT | Performed by: PHYSICIAN ASSISTANT

## 2021-11-11 PROCEDURE — 97116 GAIT TRAINING THERAPY: CPT | Mod: GP | Performed by: PHYSICAL THERAPIST

## 2021-11-11 PROCEDURE — 128N000003 HC R&B REHAB

## 2021-11-11 RX ADMIN — ENALAPRIL MALEATE 10 MG: 5 TABLET ORAL at 07:42

## 2021-11-11 RX ADMIN — SENNOSIDES AND DOCUSATE SODIUM 1 TABLET: 8.6; 5 TABLET ORAL at 07:42

## 2021-11-11 RX ADMIN — PANTOPRAZOLE SODIUM 40 MG: 40 TABLET, DELAYED RELEASE ORAL at 06:21

## 2021-11-11 RX ADMIN — ASPIRIN 325 MG ORAL TABLET 325 MG: 325 PILL ORAL at 07:45

## 2021-11-11 RX ADMIN — MIRTAZAPINE 15 MG: 7.5 TABLET, FILM COATED ORAL at 21:08

## 2021-11-11 RX ADMIN — METFORMIN HYDROCHLORIDE 1000 MG: 500 TABLET ORAL at 07:42

## 2021-11-11 RX ADMIN — ATORVASTATIN CALCIUM 40 MG: 40 TABLET, FILM COATED ORAL at 21:07

## 2021-11-11 RX ADMIN — CLOPIDOGREL BISULFATE 75 MG: 75 TABLET, FILM COATED ORAL at 07:43

## 2021-11-11 RX ADMIN — METFORMIN HYDROCHLORIDE 1000 MG: 500 TABLET ORAL at 17:06

## 2021-11-11 RX ADMIN — SENNOSIDES AND DOCUSATE SODIUM 1 TABLET: 8.6; 5 TABLET ORAL at 21:06

## 2021-11-11 ASSESSMENT — ACTIVITIES OF DAILY LIVING (ADL)
ADLS_ACUITY_SCORE: 12
ADLS_ACUITY_SCORE: 12
ADLS_ACUITY_SCORE: 11
ADLS_ACUITY_SCORE: 12
ADLS_ACUITY_SCORE: 11
ADLS_ACUITY_SCORE: 12
ADLS_ACUITY_SCORE: 12
ADLS_ACUITY_SCORE: 11
ADLS_ACUITY_SCORE: 12
ADLS_ACUITY_SCORE: 11
ADLS_ACUITY_SCORE: 12
ADLS_ACUITY_SCORE: 11
ADLS_ACUITY_SCORE: 12
ADLS_ACUITY_SCORE: 11
ADLS_ACUITY_SCORE: 12
ADLS_ACUITY_SCORE: 12
ADLS_ACUITY_SCORE: 11
ADLS_ACUITY_SCORE: 11

## 2021-11-11 NOTE — PLAN OF CARE
FOCUS/GOAL  Medical management and Safety management    ASSESSMENT, INTERVENTIONS AND CONTINUING PLAN FOR GOAL:    Pt is alert and oriented. Uses the call light appropriately. Denies pain, sob, dizziness, or any new weakness. Continent of bladder using the urinal with staff to empty. No bm this shift. A1 with the walker in transfers. No alarms. Will continue POC.

## 2021-11-11 NOTE — PLAN OF CARE
FOCUS/GOAL  Medical management    ASSESSMENT, INTERVENTIONS AND CONTINUING PLAN FOR GOAL:    A & O x 4, makes needs known. Denies any pain/discomfort. VSS, BG this shift 124 & 175. Adequate appetite noted... fluids encouraged. Continent of b/b. Voiding without any difficult using urinal. Denies urine urgency this shift. Last bm yesterday. No new concerns. Call light at reach.

## 2021-11-11 NOTE — PLAN OF CARE
"Discharge Planner Post-Acute Rehab PT:      Discharge Plan: Home with OP PT with discharge date anticipated 11/19     Precautions: Fall precaution     Current Status:  Bed Mobility: Ind  Transfer: CGA without device to complete stand pivot transfer  Gait: AMb >250 with U step walker and SBA. Intermittent freezing/festinating.  Stairs: 8 6\" steps with B railings and reciprocal pattern needing increased UE support for L step;  limited due to dizziness  Balance:   PASS:  22/36  Haas 18/56  Gait Speed: .19 m/s     Assessment:fatigued in am and demonstrating heavy (R) lean in sitting and walking. Improved in pm session although still wanting to rest in bed after session.   Other Barriers to Discharge (DME, Family Training, etc): None  "

## 2021-11-11 NOTE — PLAN OF CARE
Discharge Planner Post-Acute Rehab OT:      Discharge Plan: home with wife     Precautions: falls     Current Status:  ADLs:   G/H - standing/sitting EOS with Uwalker/4WW SBA with set up  U/b - seated SBA with set up, Min A w/long sleeve button up  L/B dressing - seated EOB with set up and stand don over hips close SBA, Min A on 11/9 to don underwear d/t getting caught on feet unable to correct w/out assist   Feet: seated, doff/don socks using cross leg tech  Shower: OT sba min cues for sequencing slower processing of shower task pt able to wash all areas  inclusing crossing leg over other to gt lowerr leg and feet and standing with cga  with rail for pericares from front and back  Tub/shower transfer: CGA     IADLs: TBA     Vision/Cognition: slums increased to 27/30  Will continue functional cog tasks     Assessment:   Pt increasing sitting and standing balance needed to increase I with adls pt continues to improve sit to stand from lower surfaces and sba good technique side stepping with kitchen counter to retreive and bring items back to refrigerator. Cont ot per poc   Other Barriers to Discharge (DME, Family Training, etc): daughter in law PT pt has rolling walker and higher toilets at home. Tub bench or small shower stool depending on which bathroom pt decides to use.

## 2021-11-11 NOTE — PROGRESS NOTES
"  [facility]   Acute Rehabilitation Unit  Daily progress note    INTERVAL HISTORY  Jose was seen and examined at bedside this morning.  No acute events reported overnight.  Patient reports that he just finished PT session, and that it did not go nearly as well as yesterday.  He states he is feeling more fatigued, feels like maybe he \"expended too much\" trying to get himself ready this morning before the session.  Therapist noted increased right lean and more difficulty doing tasks (like step-ups) that he was able to do previously.  Patient denies any headache, visual changes, dizziness, numbness/tingling, fever/chills, shortness of breath, nausea.  He states he slept very well last night and woke feeling well-rested but is now feeling tired.  He had a BM this morning.    Functionally, he worked on standing core balance with OT yesterday, increased with random button pushing standing with ability to cross over R to L and L to R without loss of balance, also able to do shoulder flex/extend and horizontal abd/adduction without loss of balance.  He ambulated without assistive device with contact guard to min assist, cues for increased amplitude and cues to stop ~ every 20 ft to regroup.    MEDICATIONS    aspirin  325 mg Oral Daily     atorvastatin  40 mg Oral QPM     clopidogrel  75 mg Oral Daily     enalapril  10 mg Oral Daily     influenza vac high-dose quad  0.7 mL Intramuscular Prior to discharge     insulin aspart  1-7 Units Subcutaneous TID AC     insulin aspart  1-5 Units Subcutaneous At Bedtime     metFORMIN  1,000 mg Oral BID w/meals     mirtazapine  15 mg Oral At Bedtime     pantoprazole  40 mg Oral QAM AC     polyethylene glycol  17 g Oral Daily     senna-docusate  1 tablet Oral BID        acetaminophen, bisacodyl, glucose **OR** dextrose **OR** glucagon, melatonin, senna-docusate     PHYSICAL EXAM  BP (!) 147/88 (BP Location: Left arm, Patient Position: Semi-Greco's)   " "Pulse 98   Temp 97  F (36.1  C) (Oral)   Resp 18   Ht 1.778 m (5' 10\")   Wt 86 kg (189 lb 11.2 oz)   SpO2 94%   BMI 27.22 kg/m     Gen: NAD, lying in bed  HEENT: NC/AT, dry mucous membranes  Cardio: RRR, +ectopic beats, no murmurs  Pulm: non-labored on room air, lungs CTA bilaterally  Abd: soft, non-tender, non-distended, bowel sounds present  Ext: no edema in bilateral lower extremities  Neuro/MSK: AAOx4, follows commands, mild facial droop on L but CN II-XII otherwise intact, sensation intact to light touch at all 4 extremities, bilateral upper extremity strength 5/5, RLE 4/5, LLE 4/5 proximally 2/5 distally.  Finger to nose with mild dysmetria bilaterally.    LABS  CBC RESULTS:   Recent Labs   Lab Test 11/11/21 0645 11/08/21  0522 11/04/21  0646   WBC 6.3 6.4 6.4   RBC 4.62 4.44 4.40   HGB 13.8 13.6 13.6   HCT 41.7 39.9* 39.1*   MCV 90 90 89   MCH 29.9 30.6 30.9   MCHC 33.1 34.1 34.8   RDW 11.9 11.9 11.8    156 150     Last Basic Metabolic Panel:  Recent Labs   Lab Test 11/11/21  0725 11/11/21  0645 11/10/21  2032 11/08/21  0743 11/08/21  0522 11/04/21  0907 11/04/21  0646   NA  --  135  --   --  136  --  136   POTASSIUM  --  4.7  --   --  4.3  --  4.4   CHLORIDE  --  106  --   --  105  --  104   CO2  --  28  --   --  26  --  25   ANIONGAP  --  1*  --   --  5  --  7   * 113* 175*   < > 125*   < > 127*   BUN  --  14  --   --  21  --  16   CR  --  1.02  --   --  0.94  --  0.92   GFRESTIMATED  --  70  --   --  77  --  79   LEON  --  8.7  --   --  8.6  --  8.2*    < > = values in this interval not displayed.       Rehabilitation - continue comprehensive acute inpatient rehabilitation program with multidisciplinary approach including therapies, rehab nursing, and physiatry following. See interval history for updates.      ASSESSMENT AND PLAN  Jose A Mallory is a 76 year old right hand dominant male with a past medical history of DM2, HTN, HLD, GERD, posterior fossa arachnoid cyst, peripheral " neuropathy, prior R pontine stroke, gait instability, and bilateral knee replacement who was admitted on 10/26/21 with progressive lower extremity weakness and gait difficulties, found to have subacute left pontine stroke with course complicated by neuropathy.  He is now admitted to ARU on 10/29 for multidisciplinary rehabilitation and ongoing medical management.     #Subacute L pontine stroke  #Subacute to chronic R pontine stroke  #Gait instability  #Arachnoid cyst  #Moderate cervical canal stenosis with disc herniation most pronounced at C3-C4, C4-C5   MRI with subacute cerebral infarction in left ventral cookie.  TTE EF 60-65%, no significant valvular disease or cardiac source for embolus.  Telemetry with NSR and no evidence of atrial fibrillation.  IV tPA was not initiated due to unclear or unfavorable risk-benefit profile for extended window thrombolysis beyond the conventional 4.5 hour time window.  Etiology of stroke thought to be atherosclerotic. Gait instability suspected to be multifactorial including peripheral neuropathy, cerebellar disease, and recent bilateral pontine strokes.  - Continue DAPT with Aspirin 325 mg daily and Plavix 75 mg daily x90 days, then stop Plavix and continue Aspirin 325 mg daily only  - High intensity statin (atorvastatin 40 mg daily) to target LDL <70  - HbA1c at goal (6.3%)  - Long-term BP goal to 120/80  - PT noting festinating gait, very slow gait, intermittent freezing, cogwheeling, difficulty initiating motor tasks.  Consider neurology consult while remains at ARU to evaluate and potentially trial medication.  - Per PT, more right lean and more difficulty with gait and step-ups today than prior days.  Neuro exam unchanged.  No other s/sx systemic illness, labs this AM WNL.  Nursing will check BG but was 113 before breakfast.  Admits to not drinking well and mouth appears dry, encouraged fluids.  Will rest before next session and continue to monitor.  - Continue PT/OT  -  Outpatient sleep evaluation for SELIN  - Follow up with neurology in 4-6 weeks  - Per hospital discharge summary, if gait has not improved after the pt's stay at acute rehab and/or by the time there is neurology follow up in the outpatient setting, please consider additional neurosurgery consultation.     #Peripheral neuropathy  #Decreased vibration sensation bilaterally in lower extremities  Per neurology, suspect that large fiber sensory dysfunction secondary to diabetes.  Vitamin B12/folate WNL, NEHA negative, HIV non reactive, RPR negative.  Copper, zinc WNL.  Protein electrophoresis with slightly elevated alpha 2, per pathologist, essentially normal, no obvious monoclonal proteins.  Homocysteine WNL, MMA WNL.  - Follow up with neurology for EMG     #HTN  [PTA enalapril 5mg PO daily]  Enalapril increased to 10 mg daily on 11/2 due to generally elevated BP.  - BP generally improved  - Continue to monitor BP, adjust meds as indicated     #DMII  [PTA meds: metformin 1000 mg BID, glipizide 10 mg daily]  A1c 6.3%.  Patient reports compliance with medications, but that he was not taking his blood glucose at home as directed.    - Monitor blood glucose TID AC and HS.  BG currently 113-175.  - Continue PTA metformin 1g BID  - PTA glipizide 10 mg held throughout inpatient admission.  BG slightly elevated but also with variable intake.  Resumed at low dose 2.5 mg daily 11/2 but with mild hypoglycemia (66), discontinued until intake improved.  - Hypoglycemia protocol  - Will need new glucometer and supplies, diabetes educator consulted    Cr uptrending  0.86 -> 1.03 -> 1.13 on 11/3.  Poor oral fluid intake, also recently increased dose of enalapril. Patient agreeable to increasing oral fluids.  Improved.  - Encourage fluids  - Repeat BMP today with Cr stable at 1.02.     Constipation  No BM for several days on admission, then with episode of emesis 10/31 and loose stools x2, now with recurrent constipation.   Receiving  Miralax and Senokot-S.  Refused suppository 11/3.  Added prune juice.  Has refused suppository x2 days.  Feeling distended/full.  No recurrent nausea/vomiting.  Bowel sounds present, passing gas.  AXR on 11/5 with no significant colonic stool burden, non-obstructive bowel gas pattern.  Did have small BM on 11/5.  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  He notes decreased interest in food over the past year or so.  He has been sleeping poorly this admission.  Struggling with being away from family as well.  - Psych consult completed 11/5, appreciate assistance  - Remeron 7.5 mg started on 11/5 for mood, appetite, sleep.  Patient states that he is sleeping better.  Ongoing issues with depressed mood, but also with significant social stressors due to wife's illness.  Appetite remains impaired.  Increased to 15 mg daily on 11/9.  RD noting weight stabilized, intake improved.        1. Adjustment to disability:  Psych consulted completed, Remeron trial started 11/5, dose increased 11/9, continue to monitor.  2. FEN: regular diet, thin liquids  3. Bowel: continent, constipation as above, continue to monitor  4. Bladder: continent, PVRs at admission x3 <100, ok to monitor PRN only.  Nocturia 11/9-11/10, no other urinary sx and not ongoing last night.  Continue to monitor.  5. DVT Prophylaxis: mechanical  6. GI Prophylaxis: PPI given DAPT + age  7. Code: full, confirmed on admission  8. Disposition: goal for home.  Per discussion with family over weekend, may consider transfer to TCU closer to home vs earlier discharge due to wife's critical illness.  As of today, patient continues to express desire to remain at ARU for full rehab course, but will continue discussion with  therapy team, SW, psychology to prepare for sooner discharge if needed.  9. ELOS: 3 weeks  10. Follow up Appointments on Discharge: PCP, stroke and general neurology        Patient discussed with Dr. Fredis Ordonez, PM&R staff physician     Bozena Bower PA-C  Physical Medicine & Rehabilitation

## 2021-11-11 NOTE — PROGRESS NOTES
SPIRITUAL HEALTH SERVICES  SPIRITUAL ASSESSMENT Progress Note  [facility] ([facility]) ARU R 508 11/11     REFERRAL SOURCE: Follow Up    Pt mentioned he was better but very tired from therapies. Pt discussed his life and knowing people who did not have belief in Yasmani and being trouble with those facts. Pt discussed the after life reflections that those who came back to life shared in tories of 'heaven and hell'. Pt mentioned he did know why Yasmani love would be rejected? Pt was affirmed and encouraged.    PLAN: Will follow up with pt soon before discharge.    Nancy Tolentino  Associate    Pager: [phone]

## 2021-11-11 NOTE — PLAN OF CARE
FOCUS/GOAL  Medication management and Safety management    ASSESSMENT, INTERVENTIONS AND CONTINUING PLAN FOR GOAL:  Pt Aox4, able to make needs known.  Pt mostly using call light appropriately but was reported by OT that pt did self transfer from toilet to sink before therapy got back with pt. Writer did education on why it is important for pt to wait for staffs assistance,  pt agrees.  PT reported pt to be weaker with more right sided lean today, PA updated and assessed pt, OT reports that this pt has been having good and bad days since admission.  Pt is diabetic, no insulin needed on writers shift.  Reg/thin diet, encouraging fluids, taking pills whole with water.  Working with therapies.  Nursing to continue with POC.

## 2021-11-12 ENCOUNTER — APPOINTMENT (OUTPATIENT)
Dept: PHYSICAL THERAPY | Facility: CLINIC | Age: 76
DRG: 056 | End: 2021-11-12
Attending: PHYSICAL MEDICINE & REHABILITATION
Payer: MEDICARE

## 2021-11-12 ENCOUNTER — APPOINTMENT (OUTPATIENT)
Dept: OCCUPATIONAL THERAPY | Facility: CLINIC | Age: 76
DRG: 056 | End: 2021-11-12
Attending: PHYSICAL MEDICINE & REHABILITATION
Payer: MEDICARE

## 2021-11-12 LAB
GLUCOSE BLDC GLUCOMTR-MCNC: 114 MG/DL (ref 70–99)
GLUCOSE BLDC GLUCOMTR-MCNC: 123 MG/DL (ref 70–99)
GLUCOSE BLDC GLUCOMTR-MCNC: 168 MG/DL (ref 70–99)

## 2021-11-12 PROCEDURE — 128N000003 HC R&B REHAB

## 2021-11-12 PROCEDURE — 250N000013 HC RX MED GY IP 250 OP 250 PS 637: Performed by: PHYSICIAN ASSISTANT

## 2021-11-12 PROCEDURE — 97112 NEUROMUSCULAR REEDUCATION: CPT | Mod: GP | Performed by: PHYSICAL THERAPIST

## 2021-11-12 PROCEDURE — 97535 SELF CARE MNGMENT TRAINING: CPT | Mod: GO

## 2021-11-12 PROCEDURE — 99233 SBSQ HOSP IP/OBS HIGH 50: CPT | Performed by: PHYSICAL MEDICINE & REHABILITATION

## 2021-11-12 PROCEDURE — 97530 THERAPEUTIC ACTIVITIES: CPT | Mod: GO

## 2021-11-12 PROCEDURE — 97110 THERAPEUTIC EXERCISES: CPT | Mod: GO

## 2021-11-12 RX ORDER — ENALAPRIL MALEATE 5 MG/1
20 TABLET ORAL DAILY
Status: DISCONTINUED | OUTPATIENT
Start: 2021-11-13 | End: 2021-11-19

## 2021-11-12 RX ADMIN — PANTOPRAZOLE SODIUM 40 MG: 40 TABLET, DELAYED RELEASE ORAL at 06:41

## 2021-11-12 RX ADMIN — POLYETHYLENE GLYCOL 3350 17 G: 17 POWDER, FOR SOLUTION ORAL at 07:41

## 2021-11-12 RX ADMIN — ASPIRIN 325 MG ORAL TABLET 325 MG: 325 PILL ORAL at 07:41

## 2021-11-12 RX ADMIN — SENNOSIDES AND DOCUSATE SODIUM 1 TABLET: 8.6; 5 TABLET ORAL at 07:40

## 2021-11-12 RX ADMIN — MIRTAZAPINE 15 MG: 7.5 TABLET, FILM COATED ORAL at 21:08

## 2021-11-12 RX ADMIN — METFORMIN HYDROCHLORIDE 1000 MG: 500 TABLET ORAL at 07:40

## 2021-11-12 RX ADMIN — SENNOSIDES AND DOCUSATE SODIUM 1 TABLET: 8.6; 5 TABLET ORAL at 21:08

## 2021-11-12 RX ADMIN — METFORMIN HYDROCHLORIDE 1000 MG: 500 TABLET ORAL at 17:45

## 2021-11-12 RX ADMIN — ENALAPRIL MALEATE 10 MG: 5 TABLET ORAL at 07:41

## 2021-11-12 RX ADMIN — ATORVASTATIN CALCIUM 40 MG: 40 TABLET, FILM COATED ORAL at 21:08

## 2021-11-12 RX ADMIN — CLOPIDOGREL BISULFATE 75 MG: 75 TABLET, FILM COATED ORAL at 07:41

## 2021-11-12 ASSESSMENT — ACTIVITIES OF DAILY LIVING (ADL)
ADLS_ACUITY_SCORE: 14
ADLS_ACUITY_SCORE: 14
ADLS_ACUITY_SCORE: 12
ADLS_ACUITY_SCORE: 14
ADLS_ACUITY_SCORE: 14
ADLS_ACUITY_SCORE: 12
ADLS_ACUITY_SCORE: 12
ADLS_ACUITY_SCORE: 14
ADLS_ACUITY_SCORE: 14
ADLS_ACUITY_SCORE: 12
ADLS_ACUITY_SCORE: 14
ADLS_ACUITY_SCORE: 12
ADLS_ACUITY_SCORE: 14
ADLS_ACUITY_SCORE: 14
ADLS_ACUITY_SCORE: 12
ADLS_ACUITY_SCORE: 12
ADLS_ACUITY_SCORE: 14
ADLS_ACUITY_SCORE: 12
ADLS_ACUITY_SCORE: 14
ADLS_ACUITY_SCORE: 14
ADLS_ACUITY_SCORE: 12

## 2021-11-12 NOTE — PLAN OF CARE
FOCUS/GOAL  Medical management    ASSESSMENT, INTERVENTIONS AND CONTINUING PLAN FOR GOAL:    Pt is alert and oriented. Make needs known. No self-transferring tonight, stayed in bed the whole shift. Was waking up from time to time and had trouble falling asleep. Offered pharmacological intervention but declined. Sleeping off and on throughout. Denied pain, sob, chest pain, dizziness, N/T or any new weakness. Independent in bed mobility. Continent of bowel and bladder. No bm this shift. Used the urinal with staff to empty. Refused weight this morning. Will continue POC.

## 2021-11-12 NOTE — PLAN OF CARE
Denies pain, voiding spontaneously on the toilet and staff empties.Had good appetite, BG's 114 and 123, BP this morning 169/90, denies any symptoms at the time,schedule vasotec given, recheck and it was 142/74. Using the call light appropriately, will continue poc

## 2021-11-12 NOTE — PLAN OF CARE
FOCUS/GOAL  Medication management and Safety management     ASSESSMENT, INTERVENTIONS AND CONTINUING PLAN FOR GOAL:  Pt Aox4, able to make needs known.  CGA1 with walker/GB. Denies pain, cough, sob, chest pain, dizziness, N/V, N/T.  Pt BP has been elevated today, BP medication increase made to start in the morning.  Pt is diabetic, no insulin needed on writers shift.  Reg/thin diet, encouraging fluids, taking pills whole with water.  Working with therapies.  Nursing to continue with POC.

## 2021-11-12 NOTE — PROGRESS NOTES
"  [facility]   Acute Rehabilitation Unit  Daily progress note    INTERVAL HISTORY  Jose was seen and examined at bedside this morning.  No acute events reported overnight.  Today, he reports that he had a much better day today, feeling stronger in therapies and also enjoyed them.  He reports to be sleeping fine.  He denies any pain, shortness of breath, dizziness, nausea, bowel or bladder concerns.  He feels like he is drinking better than before.  No BM yet today, but feels like he may soon.  He is looking forward to a visit with a friend this evening.    Functionally, he is currently needing standby assist for upper body dressing with min assist for long-sleeve button up shirt.  He is able to complete lower body dressing with min assist.  He needs contact guard assist for shower transfer and tasks.  SLUMS increased to 27/30.  He also needs contact guard assist for stand pivot transfers without device, ambulating up to 250' with U step walker and standby assist.    MEDICATIONS    aspirin  325 mg Oral Daily     atorvastatin  40 mg Oral QPM     clopidogrel  75 mg Oral Daily     enalapril  10 mg Oral Daily     influenza vac high-dose quad  0.7 mL Intramuscular Prior to discharge     insulin aspart  1-7 Units Subcutaneous TID AC     insulin aspart  1-5 Units Subcutaneous At Bedtime     metFORMIN  1,000 mg Oral BID w/meals     mirtazapine  15 mg Oral At Bedtime     pantoprazole  40 mg Oral QAM AC     polyethylene glycol  17 g Oral Daily     senna-docusate  1 tablet Oral BID        acetaminophen, bisacodyl, glucose **OR** dextrose **OR** glucagon, melatonin, senna-docusate     PHYSICAL EXAM  /74 (BP Location: Left arm)   Pulse 97   Temp (!) 95.7  F (35.4  C) (Oral)   Resp 20   Ht 1.778 m (5' 10\")   Wt 86 kg (189 lb 11.2 oz)   SpO2 95%   BMI 27.22 kg/m     Gen: NAD, sitting up in chair  HEENT: NC/AT  Cardio: RRR, +ectopic beats, no murmurs  Pulm: non-labored on room air, " lungs CTA bilaterally  Abd: soft, non-tender, non-distended, bowel sounds present  Ext: no edema in bilateral lower extremities  Neuro/MSK: AAOx4, follows commands, mild facial droop on L    LABS  CBC RESULTS:   Recent Labs   Lab Test 11/11/21  0645 11/08/21  0522 11/04/21  0646   WBC 6.3 6.4 6.4   RBC 4.62 4.44 4.40   HGB 13.8 13.6 13.6   HCT 41.7 39.9* 39.1*   MCV 90 90 89   MCH 29.9 30.6 30.9   MCHC 33.1 34.1 34.8   RDW 11.9 11.9 11.8    156 150     Last Basic Metabolic Panel:  Recent Labs   Lab Test 11/12/21  0709 11/11/21  2103 11/11/21  1713 11/11/21  0725 11/11/21  0645 11/08/21  0743 11/08/21  0522 11/04/21  0907 11/04/21  0646   NA  --   --   --   --  135  --  136  --  136   POTASSIUM  --   --   --   --  4.7  --  4.3  --  4.4   CHLORIDE  --   --   --   --  106  --  105  --  104   CO2  --   --   --   --  28  --  26  --  25   ANIONGAP  --   --   --   --  1*  --  5  --  7   * 126* 138*   < > 113*   < > 125*   < > 127*   BUN  --   --   --   --  14  --  21  --  16   CR  --   --   --   --  1.02  --  0.94  --  0.92   GFRESTIMATED  --   --   --   --  70  --  77  --  79   LEON  --   --   --   --  8.7  --  8.6  --  8.2*    < > = values in this interval not displayed.       Rehabilitation - continue comprehensive acute inpatient rehabilitation program with multidisciplinary approach including therapies, rehab nursing, and physiatry following. See interval history for updates.      ASSESSMENT AND PLAN  Jose Mallory is a 76 year old right hand dominant male with a past medical history of DM2, HTN, HLD, GERD, posterior fossa arachnoid cyst, peripheral neuropathy, prior R pontine stroke, gait instability, and bilateral knee replacement who was admitted on 10/26/21 with progressive lower extremity weakness and gait difficulties, found to have subacute left pontine stroke with course complicated by neuropathy.  He is now admitted to ARU on 10/29 for multidisciplinary rehabilitation and ongoing medical  management.     #Subacute L pontine stroke  #Subacute to chronic R pontine stroke  #Gait instability  #Arachnoid cyst  #Moderate cervical canal stenosis with disc herniation most pronounced at C3-C4, C4-C5   MRI with subacute cerebral infarction in left ventral cookie.  TTE EF 60-65%, no significant valvular disease or cardiac source for embolus.  Telemetry with NSR and no evidence of atrial fibrillation.  IV tPA was not initiated due to unclear or unfavorable risk-benefit profile for extended window thrombolysis beyond the conventional 4.5 hour time window.  Etiology of stroke thought to be atherosclerotic. Gait instability suspected to be multifactorial including peripheral neuropathy, cerebellar disease, and recent bilateral pontine strokes.  - Continue DAPT with Aspirin 325 mg daily and Plavix 75 mg daily x90 days, then stop Plavix and continue Aspirin 325 mg daily only  - High intensity statin (atorvastatin 40 mg daily) to target LDL <70  - HbA1c at goal (6.3%)  - Long-term BP goal to 120/80  - PT noting festinating gait, very slow gait, intermittent freezing, cogwheeling, difficulty initiating motor tasks.  Plan to follow-up with neurology  - Per PT, more right lean and more difficulty with gait and step-ups 11/11 than prior days.  Neuro exam unchanged.  No other s/sx systemic illness, labs WNL.  Nursing will check BG but was 113 before breakfast.  Admits to not drinking well and mouth appears dry, encouraged fluids.  Today, function again improved.  Seems to have fluctuations throughout rehab course, possibly related to fatigue.  Will continue to monior.  - Continue PT/OT  - Outpatient sleep evaluation for SELIN  - Follow up with neurology in 4-6 weeks  - Per hospital discharge summary, if gait has not improved after the pt's stay at acute rehab and/or by the time there is neurology follow up in the outpatient setting, please consider additional neurosurgery consultation.     #Peripheral neuropathy  #Decreased  vibration sensation bilaterally in lower extremities  Per neurology, suspect that large fiber sensory dysfunction secondary to diabetes.  Vitamin B12/folate WNL, NEHA negative, HIV non reactive, RPR negative.  Copper, zinc WNL.  Protein electrophoresis with slightly elevated alpha 2, per pathologist, essentially normal, no obvious monoclonal proteins.  Homocysteine WNL, MMA WNL.  - Follow up with neurology for EMG     #HTN  [PTA enalapril 5mg PO daily]  Enalapril increased to 10 mg daily on 11/2 due to generally elevated BP.  - BP remains elevated, will increase enalapril to 20 mg daily  - Continue to monitor BP, adjust meds as indicated     #DMII  [PTA meds: metformin 1000 mg BID, glipizide 10 mg daily]  A1c 6.3%.  Patient reports compliance with medications, but that he was not taking his blood glucose at home as directed.    - Monitor blood glucose TID AC and HS.  BG currently 114-165.  - Continue PTA metformin 1g BID  - PTA glipizide 10 mg held throughout inpatient admission.  BG slightly elevated but also with variable intake.  Resumed at low dose 2.5 mg daily 11/2 but with mild hypoglycemia (66), discontinued until intake improved.  - Hypoglycemia protocol  - Will need new glucometer and supplies, diabetes educator consulted    Cr uptrending  0.86 -> 1.03 -> 1.13 on 11/3.  Poor oral fluid intake, also recently increased dose of enalapril. Patient agreeable to increasing oral fluids.  Improved.  - Encourage fluids  - BMP on 11/11 with Cr stable at 1.02.  Repeat Monday.    Constipation  No BM for several days on admission, then with episode of emesis 10/31 and loose stools x2, now with recurrent constipation.   Receiving Miralax and Senokot-S.  Refused suppository 11/3.  Added prune juice.  Has refused suppository x2 days.  Feeling distended/full.  No recurrent nausea/vomiting.  Bowel sounds present, passing gas.  AXR on 11/5 with no significant colonic stool burden, non-obstructive bowel gas pattern.  Did have  small BM on 11/5.  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  He notes decreased interest in food over the past year or so.  He has been sleeping poorly this admission.  Struggling with being away from family as well.  - Psych consult completed 11/5, appreciate assistance  - Remeron 7.5 mg started on 11/5 for mood, appetite, sleep.  Patient states that he is sleeping better.  Ongoing issues with depressed mood, but also with significant social stressors due to wife's illness.  Appetite remains impaired.  Increased to 15 mg daily on 11/9.  RD noting weight stabilized, intake improved.        1. Adjustment to disability:  Psych consulted completed, Remeron trial started 11/5, dose increased 11/9, continue to monitor.  2. FEN: regular diet, thin liquids  3. Bowel: continent, constipation as above, continue to monitor  4. Bladder: continent, PVRs at admission x3 <100, ok to monitor PRN only.  Nocturia 11/9-11/10, no other urinary sx and not ongoing last night.  Continue to monitor.  5. DVT Prophylaxis: mechanical  6. GI Prophylaxis: PPI given DAPT + age  7. Code: full, confirmed on admission  8. Disposition: goal for home.  Per discussion with family over weekend, may consider transfer to TCU closer to home vs earlier discharge due to wife's critical illness.  As of today, patient continues to express desire to remain at ARU for full rehab course, but will continue discussion with therapy team, SW, psychology to prepare for sooner discharge if needed.  9. ELOS: 3 weeks  10. Follow up Appointments on Discharge: PCP, stroke and general neurology        Patient discussed with Dr. Fredis Ordonez, PM&R staff physician     Bozena Bower PA-C  Physical Medicine &  Rehabilitation

## 2021-11-12 NOTE — PLAN OF CARE
Discharge Planner Post-Acute Rehab OT:      Discharge Plan: home with wife     Precautions: falls     Current Status:  ADLs:   G/H - standing/sitting EOS with Uwalker/4WW SBA with set up  U/b - seated SBA with set up, Min A w/long sleeve button up  L/B dressing - seated EOB with set up and stand don over hips close SBA, Min A on 11/9 to don underwear d/t getting caught on feet unable to correct w/out assist   Feet: seated w/support of chair, doff/don socks using cross leg tech  Shower: OT sba min cues for sequencing slower processing of shower task pt able to wash all areas  inclusing crossing leg over other to gt lowerr leg and feet and standing with cga  with rail for pericares from front and back  Tub/shower transfer: CGA     IADLs: TBA     Vision/Cognition: slums increased to 27/30  Will continue functional cog tasks     Assessment: pt improving with endurance and balance with turns w/walker in during ADL and therapeutic activities, continues to have some challenges with unsupported sitting balance when BUE involved with a task, Cont per POC     Other Barriers to Discharge (DME, Family Training, etc): daughter in law PT pt has rolling walker and higher toilets at home. Tub bench or small shower stool depending on which bathroom pt decides to use.

## 2021-11-12 NOTE — PLAN OF CARE
"Discharge Planner Post-Acute Rehab PT:      Discharge Plan: Home with OP PT with discharge date anticipated 11/19     Precautions: Fall precaution     Current Status:  Bed Mobility: Ind  Transfer: CGA without device to complete stand pivot transfer  Gait: AMb >250 with U step walker and SBA. Intermittent freezing/festinating.  Stairs: 8 6\" steps with B railings and reciprocal pattern needing increased UE support for L step;  limited due to dizziness  Balance:   PASS:  22/36  Haas 37/56  Gait Speed: .19 m/s     Assessment: continues to improve, benefiting from HIT gait w/ master vest and reactive balance tasks  Other Barriers to Discharge (DME, Family Training, etc): None  "

## 2021-11-12 NOTE — PLAN OF CARE
Patient used the call light appropriately this evening.    States he did not have a great day today.  States he was more fatigued and weak overall.  No neuro changes noted and VSS.    PLC stroke class has been ordered however patient has not attended it yet.

## 2021-11-13 ENCOUNTER — APPOINTMENT (OUTPATIENT)
Dept: OCCUPATIONAL THERAPY | Facility: CLINIC | Age: 76
DRG: 056 | End: 2021-11-13
Attending: PHYSICAL MEDICINE & REHABILITATION
Payer: MEDICARE

## 2021-11-13 ENCOUNTER — APPOINTMENT (OUTPATIENT)
Dept: PHYSICAL THERAPY | Facility: CLINIC | Age: 76
DRG: 056 | End: 2021-11-13
Attending: PHYSICAL MEDICINE & REHABILITATION
Payer: MEDICARE

## 2021-11-13 LAB
GLUCOSE BLDC GLUCOMTR-MCNC: 111 MG/DL (ref 70–99)
GLUCOSE BLDC GLUCOMTR-MCNC: 152 MG/DL (ref 70–99)
GLUCOSE BLDC GLUCOMTR-MCNC: 163 MG/DL (ref 70–99)
GLUCOSE BLDC GLUCOMTR-MCNC: 179 MG/DL (ref 70–99)

## 2021-11-13 PROCEDURE — 97110 THERAPEUTIC EXERCISES: CPT | Mod: GP | Performed by: PHYSICAL THERAPIST

## 2021-11-13 PROCEDURE — 97530 THERAPEUTIC ACTIVITIES: CPT | Mod: GO

## 2021-11-13 PROCEDURE — 97116 GAIT TRAINING THERAPY: CPT | Mod: GP | Performed by: PHYSICAL THERAPIST

## 2021-11-13 PROCEDURE — 97110 THERAPEUTIC EXERCISES: CPT | Mod: GO

## 2021-11-13 PROCEDURE — 250N000013 HC RX MED GY IP 250 OP 250 PS 637: Performed by: PHYSICIAN ASSISTANT

## 2021-11-13 PROCEDURE — 97535 SELF CARE MNGMENT TRAINING: CPT | Mod: GO

## 2021-11-13 PROCEDURE — 97112 NEUROMUSCULAR REEDUCATION: CPT | Mod: GP | Performed by: PHYSICAL THERAPIST

## 2021-11-13 PROCEDURE — 128N000003 HC R&B REHAB

## 2021-11-13 RX ADMIN — INSULIN ASPART 1 UNITS: 100 INJECTION, SOLUTION INTRAVENOUS; SUBCUTANEOUS at 09:01

## 2021-11-13 RX ADMIN — ATORVASTATIN CALCIUM 40 MG: 40 TABLET, FILM COATED ORAL at 21:14

## 2021-11-13 RX ADMIN — ENALAPRIL MALEATE 20 MG: 5 TABLET ORAL at 09:02

## 2021-11-13 RX ADMIN — POLYETHYLENE GLYCOL 3350 17 G: 17 POWDER, FOR SOLUTION ORAL at 09:03

## 2021-11-13 RX ADMIN — CLOPIDOGREL BISULFATE 75 MG: 75 TABLET, FILM COATED ORAL at 09:02

## 2021-11-13 RX ADMIN — INSULIN ASPART 1 UNITS: 100 INJECTION, SOLUTION INTRAVENOUS; SUBCUTANEOUS at 16:57

## 2021-11-13 RX ADMIN — MIRTAZAPINE 15 MG: 7.5 TABLET, FILM COATED ORAL at 21:14

## 2021-11-13 RX ADMIN — METFORMIN HYDROCHLORIDE 1000 MG: 500 TABLET ORAL at 09:02

## 2021-11-13 RX ADMIN — METFORMIN HYDROCHLORIDE 1000 MG: 500 TABLET ORAL at 17:01

## 2021-11-13 RX ADMIN — ASPIRIN 325 MG ORAL TABLET 325 MG: 325 PILL ORAL at 09:03

## 2021-11-13 RX ADMIN — SENNOSIDES AND DOCUSATE SODIUM 1 TABLET: 8.6; 5 TABLET ORAL at 09:06

## 2021-11-13 RX ADMIN — SENNOSIDES AND DOCUSATE SODIUM 1 TABLET: 8.6; 5 TABLET ORAL at 21:14

## 2021-11-13 RX ADMIN — PANTOPRAZOLE SODIUM 40 MG: 40 TABLET, DELAYED RELEASE ORAL at 05:04

## 2021-11-13 ASSESSMENT — ACTIVITIES OF DAILY LIVING (ADL)
ADLS_ACUITY_SCORE: 14

## 2021-11-13 NOTE — PLAN OF CARE
FOCUS/GOAL  Medical management    ASSESSMENT, INTERVENTIONS AND CONTINUING PLAN FOR GOAL:  Patient slept well tonight. No c/o pain. He used the urinal only once. BP; 138/79 around 0500 and 127/86 at 0640. No concerns tonight.

## 2021-11-13 NOTE — PLAN OF CARE
Discharge Planner Post-Acute Rehab OT:      Discharge Plan: home with wife     Precautions: falls     Current Status:  ADLs:   G/H - standing/sitting EOS with Uwalker/4WW SBA with set up  U/b - seated SBA with set up   L/B dressing - seated EOB with set up and stand don over hips close SBA  Feet: seated w/support of chair, doff/don socks using cross leg tech, min A to hold LLE on knee  Shower: OT sba min cues for sequencing slower processing of shower task pt able to wash all areas  inclusing crossing leg over other to gt lowerr leg and feet and standing with cga  with rail for pericares from front and back  Tub/shower transfer: CGA     IADLs: TBA     Vision/Cognition: slums increased to 27/30  Will continue functional cog tasks     Assessment: pt progressing towards OT goals. Continues to improve IND with ADL tasks, continues to have some challenges with unsupported sitting balance when BUE involved with a task such as LE dressing. Cont per POC.     Other Barriers to Discharge (DME, Family Training, etc): daughter in law PT pt has rolling walker and higher toilets at home. Tub bench or small shower stool depending on which bathroom pt decides to use.

## 2021-11-13 NOTE — PLAN OF CARE
"Discharge Planner Post-Acute Rehab PT:      Discharge Plan: Home with OP PT with discharge date anticipated 11/19     Precautions: Fall precaution     Current Status:  Bed Mobility: Ind  Transfer: CGA without device to complete stand pivot transfer  Gait: AMb >250 with U step walker and SBA. Intermittent freezing/festinating.  Stairs: 8 6\" steps with B railings and reciprocal pattern needing increased UE support for L step;  limited due to dizziness  Balance:   PASS:  22/36  Haas 37/56  Gait Speed: .19 m/s     Assessment: Pt declined work with master vest today.  Pt reported very tired from extensive day yesterday.  Pt worked on functional activity level, strength and dynamic balance in PT gym.  Increased rest needed due to fatigue today.  Pt agreeable to use of master vest with HIT training tomorrow.  Other Barriers to Discharge (DME, Family Training, etc): None  "

## 2021-11-13 NOTE — PLAN OF CARE
Denies pain, voiding spontaneously in the urinal and staff empties it, appetite is good, BG's 179 and 111, will continue POC

## 2021-11-14 ENCOUNTER — APPOINTMENT (OUTPATIENT)
Dept: OCCUPATIONAL THERAPY | Facility: CLINIC | Age: 76
DRG: 056 | End: 2021-11-14
Attending: PHYSICAL MEDICINE & REHABILITATION
Payer: MEDICARE

## 2021-11-14 ENCOUNTER — APPOINTMENT (OUTPATIENT)
Dept: PHYSICAL THERAPY | Facility: CLINIC | Age: 76
DRG: 056 | End: 2021-11-14
Attending: PHYSICAL MEDICINE & REHABILITATION
Payer: MEDICARE

## 2021-11-14 LAB
GLUCOSE BLDC GLUCOMTR-MCNC: 135 MG/DL (ref 70–99)
GLUCOSE BLDC GLUCOMTR-MCNC: 155 MG/DL (ref 70–99)
GLUCOSE BLDC GLUCOMTR-MCNC: 167 MG/DL (ref 70–99)
GLUCOSE BLDC GLUCOMTR-MCNC: 181 MG/DL (ref 70–99)
SARS-COV-2 RNA RESP QL NAA+PROBE: NEGATIVE

## 2021-11-14 PROCEDURE — 250N000013 HC RX MED GY IP 250 OP 250 PS 637: Performed by: STUDENT IN AN ORGANIZED HEALTH CARE EDUCATION/TRAINING PROGRAM

## 2021-11-14 PROCEDURE — 97530 THERAPEUTIC ACTIVITIES: CPT | Mod: GP | Performed by: PHYSICAL THERAPIST

## 2021-11-14 PROCEDURE — 99232 SBSQ HOSP IP/OBS MODERATE 35: CPT | Performed by: PHYSICAL MEDICINE & REHABILITATION

## 2021-11-14 PROCEDURE — U0005 INFEC AGEN DETEC AMPLI PROBE: HCPCS | Performed by: PHYSICAL MEDICINE & REHABILITATION

## 2021-11-14 PROCEDURE — 128N000003 HC R&B REHAB

## 2021-11-14 PROCEDURE — 250N000013 HC RX MED GY IP 250 OP 250 PS 637: Performed by: PHYSICIAN ASSISTANT

## 2021-11-14 PROCEDURE — 97530 THERAPEUTIC ACTIVITIES: CPT | Mod: GO

## 2021-11-14 PROCEDURE — 97112 NEUROMUSCULAR REEDUCATION: CPT | Mod: GO

## 2021-11-14 RX ADMIN — ACETAMINOPHEN 650 MG: 325 TABLET, FILM COATED ORAL at 09:13

## 2021-11-14 RX ADMIN — SENNOSIDES AND DOCUSATE SODIUM 1 TABLET: 8.6; 5 TABLET ORAL at 21:08

## 2021-11-14 RX ADMIN — BENZOCAINE AND MENTHOL 1 LOZENGE: 15; 3.6 LOZENGE ORAL at 23:35

## 2021-11-14 RX ADMIN — METFORMIN HYDROCHLORIDE 1000 MG: 500 TABLET ORAL at 09:14

## 2021-11-14 RX ADMIN — ENALAPRIL MALEATE 20 MG: 5 TABLET ORAL at 09:15

## 2021-11-14 RX ADMIN — CLOPIDOGREL BISULFATE 75 MG: 75 TABLET, FILM COATED ORAL at 09:14

## 2021-11-14 RX ADMIN — ASPIRIN 325 MG ORAL TABLET 325 MG: 325 PILL ORAL at 09:14

## 2021-11-14 RX ADMIN — MIRTAZAPINE 15 MG: 7.5 TABLET, FILM COATED ORAL at 21:09

## 2021-11-14 RX ADMIN — SENNOSIDES AND DOCUSATE SODIUM 1 TABLET: 8.6; 5 TABLET ORAL at 09:14

## 2021-11-14 RX ADMIN — INSULIN ASPART 1 UNITS: 100 INJECTION, SOLUTION INTRAVENOUS; SUBCUTANEOUS at 17:27

## 2021-11-14 RX ADMIN — ATORVASTATIN CALCIUM 40 MG: 40 TABLET, FILM COATED ORAL at 21:09

## 2021-11-14 RX ADMIN — PANTOPRAZOLE SODIUM 40 MG: 40 TABLET, DELAYED RELEASE ORAL at 06:43

## 2021-11-14 RX ADMIN — INSULIN ASPART 1 UNITS: 100 INJECTION, SOLUTION INTRAVENOUS; SUBCUTANEOUS at 12:15

## 2021-11-14 RX ADMIN — BENZOCAINE AND MENTHOL 1 LOZENGE: 15; 3.6 LOZENGE ORAL at 20:40

## 2021-11-14 RX ADMIN — METFORMIN HYDROCHLORIDE 1000 MG: 500 TABLET ORAL at 17:27

## 2021-11-14 ASSESSMENT — ACTIVITIES OF DAILY LIVING (ADL)
ADLS_ACUITY_SCORE: 14

## 2021-11-14 ASSESSMENT — MIFFLIN-ST. JEOR: SCORE: 1581.28

## 2021-11-14 NOTE — PROGRESS NOTES
[facility], [place]   Physical Medicine and Rehabilitation Daily Note           Assessment and Plan of Care:   Jose Mallory is a 76-year-old male with a history of DM type II, HTN, HLD, GERD, posterior fossa arachnoid cyst, peripheral neuropathy, prior right pontine stroke, and bilateral knee replacements who was admitted with progressive lower extremity weakness and gait difficulties found to be secondary to a left pontine stroke.  Admitted to acute rehab on 10/29/2021 for ongoing medical management and multidisciplinary rehab.    --Vitals stable.  --Labs: -179  --Continue ongoing medical management.  --Continue therapies and plan of care.             Interval history:   Patient seen and examined at bedside.  Patient states that he is doing well in therapies, and is looking forward to getting home to be with his wife who he recently found out had a STEMI.  No other concerns today.  Denies fever, chills, CP, SOB, N/V, abdominal pain, new pain or weakness/numbness/tingling.             Physical Exam:   VS:   Vitals:    11/13/21 0643 11/13/21 0857 11/13/21 1619 11/14/21 0700   BP: 127/86 125/71 123/64 (!) 147/80   BP Location: Right arm Left arm Left arm Left arm   Patient Position:  Semi-Greco's     Pulse: 89 74 89 80   Resp: 20 20 18 18   Temp: 97.4  F (36.3  C) (!) 96.1  F (35.6  C) 97.9  F (36.6  C) 96.9  F (36.1  C)   TempSrc: Oral Oral Oral Oral   SpO2: 96% 95% 95% 95%   Weight:       Height:         Gen: NAD, resting comfortably in bed  Lungs: breathing unlabored on room air  Abd: soft and non-tender  Ext: no edema in BLE, no calf tenderness  MSK/neuro: Alert and oriented, speech fluent, moves all four extremities volitionally, although has mild left facial droop and left hemiparesis         Data:   Scheduled meds    aspirin  325 mg Oral Daily     atorvastatin  40 mg Oral QPM     clopidogrel  75 mg Oral Daily     enalapril  20 mg Oral Daily     influenza vac high-dose quad   0.7 mL Intramuscular Prior to discharge     insulin aspart  1-7 Units Subcutaneous TID AC     insulin aspart  1-5 Units Subcutaneous At Bedtime     metFORMIN  1,000 mg Oral BID w/meals     mirtazapine  15 mg Oral At Bedtime     pantoprazole  40 mg Oral QAM AC     polyethylene glycol  17 g Oral Daily     senna-docusate  1 tablet Oral BID       PRN meds:  acetaminophen, bisacodyl, glucose **OR** dextrose **OR** glucagon, melatonin, senna-docusate      Shelia Devries MD  Physical Medicine and Rehabilitation     I spent a total of 25 minutes face-to-face and managing the care of Jose Mallory. Over 50% of my time on the unit was spent counseling the patient and coordinating care. Please see note for details.

## 2021-11-14 NOTE — PLAN OF CARE
VS: VSS   O2: Stable on RA.   Output: Void in urinal and toilet.    Last BM: 11/13 per patient.   Activity: Up with walker, gait belt and SBA.   Skin: Dressed and unable to do full assessment. Will inform oncoming shift to do full assessment when undressed.   Pain: HA this am. Tylenol with some relief. This afternoon having runny nose and sore throat.    CMS: Intact.   Dressing: None   Diet: Regular   LDA: None   Equipment: Walker, gait belt, bed/chair alarm, call light in reach, personal belongings.   Plan: Continue per POC.   Additional Info: Patient agreed to Covid Test.

## 2021-11-14 NOTE — PLAN OF CARE
FOCUS/GOAL  Medical management    ASSESSMENT, INTERVENTIONS AND CONTINUING PLAN FOR GOAL:  Patient reported he did not sleep as well as he did last night, no c/o pain, used urinal independently. No other concerns.

## 2021-11-14 NOTE — PLAN OF CARE
"Discharge Planner Post-Acute Rehab PT:      Discharge Plan: Home with OP PT with discharge date anticipated 11/19     Precautions: Fall precaution     Current Status:  Bed Mobility: Ind  Transfer: CGA without device to complete stand pivot transfer  Gait: AMb >250 with U step walker and SBA. Intermittent freezing/festinating.  Stairs: 8 6\" steps with B railings and reciprocal pattern needing increased UE support for L step;  limited due to dizziness  Balance:   PASS:  22/36  Haas 37/56  Gait Speed: .19 m/s     Assessment: Pt continues to progress with mobility using Master Vest and [employer] track.  Pt limited due to hip extension weakness and LBP with prolonged amb without UE support.  Cx PM session due to not feeling well and RN administration of CoVid 19 text.      Other Barriers to Discharge (DME, Family Training, etc): None  "

## 2021-11-14 NOTE — PLAN OF CARE
"Discharge Planner Post-Acute Rehab OT:      Discharge Plan: home with wife; however, wife in hospital currently, as well     Precautions: falls     Current Status:  ADLs:   G/H - standing/sitting EOS with Uwalker/4WW SBA with set up  U/b - seated SBA with set up   L/B dressing - seated EOB with set up and stand don over hips close SBA  Feet: seated w/support of chair, doff/don socks using cross leg tech, min A to hold LLE on knee  Shower: OT sba min cues for sequencing slower processing of shower task pt able to wash all areas  inclusing crossing leg over other to gt lowerr leg and feet and standing with cga  with rail for pericares from front and back  Tub/shower transfer: CGA     IADLs: TBA     Vision/Cognition: slums increased to 27/30  Will continue functional cog tasks     Assessment: Pt motivated and cooperative with tx this day; at first was hesitant to increase time of tx, but pt reported tx was creative and so enjoyed it \"better than unloading the  or something like that...\"  Skilled OT tx addressed dynamic standing balance for improved IADLs task completion; however, pt not interested in doing IADLs tasks in kitchen so simulated tasks instead, which increased cooperation.   Note: pt reports sore throat x1 week, runny nose just started in past 1-2 days and reporting chills this day; f/u with nurse coming in to do COVID test at end of visit. Will await results.    Other Barriers to Discharge (DME, Family Training, etc): daughter in law PT pt has rolling walker and higher toilets at home. Tub bench or small shower stool depending on which bathroom pt decides to use.           "

## 2021-11-14 NOTE — PLAN OF CARE
FOCUS/GOAL  Medication management and Safety management     ASSESSMENT, INTERVENTIONS AND CONTINUING PLAN FOR GOAL:  Pt Aox4, able to make needs known.  CGA1 with walker/GB. Denies pain, cough, sob, chest pain, dizziness, N/V, N/T.   Pt is diabetic, no insulin needed on writers shift.  Cont of B&B, LBM 11/13. Reg/thin diet, encouraging fluids, taking pills whole with water.  Working with therapies.  Nursing to continue with POC.

## 2021-11-15 ENCOUNTER — APPOINTMENT (OUTPATIENT)
Dept: GENERAL RADIOLOGY | Facility: CLINIC | Age: 76
DRG: 056 | End: 2021-11-15
Attending: PHYSICIAN ASSISTANT
Payer: MEDICARE

## 2021-11-15 ENCOUNTER — APPOINTMENT (OUTPATIENT)
Dept: EDUCATION SERVICES | Facility: CLINIC | Age: 76
DRG: 056 | End: 2021-11-15
Attending: PHYSICAL MEDICINE & REHABILITATION
Payer: MEDICARE

## 2021-11-15 LAB
ANION GAP SERPL CALCULATED.3IONS-SCNC: 8 MMOL/L (ref 3–14)
BUN SERPL-MCNC: 9 MG/DL (ref 7–30)
CALCIUM SERPL-MCNC: 8.9 MG/DL (ref 8.5–10.1)
CHLORIDE BLD-SCNC: 102 MMOL/L (ref 94–109)
CO2 SERPL-SCNC: 26 MMOL/L (ref 20–32)
CREAT SERPL-MCNC: 1.02 MG/DL (ref 0.66–1.25)
CRP SERPL-MCNC: 19 MG/L (ref 0–8)
ERYTHROCYTE [DISTWIDTH] IN BLOOD BY AUTOMATED COUNT: 11.7 % (ref 10–15)
FLUAV AG SPEC QL IA: NEGATIVE
FLUBV AG SPEC QL IA: NEGATIVE
GFR SERPL CREATININE-BSD FRML MDRD: 70 ML/MIN/1.73M2
GLUCOSE BLD-MCNC: 150 MG/DL (ref 70–99)
GLUCOSE BLDC GLUCOMTR-MCNC: 128 MG/DL (ref 70–99)
GLUCOSE BLDC GLUCOMTR-MCNC: 148 MG/DL (ref 70–99)
GLUCOSE BLDC GLUCOMTR-MCNC: 149 MG/DL (ref 70–99)
GLUCOSE BLDC GLUCOMTR-MCNC: 185 MG/DL (ref 70–99)
HCT VFR BLD AUTO: 42.3 % (ref 40–53)
HGB BLD-MCNC: 14.3 G/DL (ref 13.3–17.7)
MCH RBC QN AUTO: 30.3 PG (ref 26.5–33)
MCHC RBC AUTO-ENTMCNC: 33.8 G/DL (ref 31.5–36.5)
MCV RBC AUTO: 90 FL (ref 78–100)
PLATELET # BLD AUTO: 145 10E3/UL (ref 150–450)
POTASSIUM BLD-SCNC: 4.3 MMOL/L (ref 3.4–5.3)
PROCALCITONIN SERPL-MCNC: 0.09 NG/ML
RBC # BLD AUTO: 4.72 10E6/UL (ref 4.4–5.9)
SODIUM SERPL-SCNC: 136 MMOL/L (ref 133–144)
WBC # BLD AUTO: 11.6 10E3/UL (ref 4–11)

## 2021-11-15 PROCEDURE — 82374 ASSAY BLOOD CARBON DIOXIDE: CPT | Performed by: PHYSICIAN ASSISTANT

## 2021-11-15 PROCEDURE — 87804 INFLUENZA ASSAY W/OPTIC: CPT | Performed by: PHYSICIAN ASSISTANT

## 2021-11-15 PROCEDURE — 71046 X-RAY EXAM CHEST 2 VIEWS: CPT

## 2021-11-15 PROCEDURE — 71046 X-RAY EXAM CHEST 2 VIEWS: CPT | Mod: 26 | Performed by: RADIOLOGY

## 2021-11-15 PROCEDURE — 84145 PROCALCITONIN (PCT): CPT | Performed by: PHYSICIAN ASSISTANT

## 2021-11-15 PROCEDURE — 250N000013 HC RX MED GY IP 250 OP 250 PS 637: Performed by: STUDENT IN AN ORGANIZED HEALTH CARE EDUCATION/TRAINING PROGRAM

## 2021-11-15 PROCEDURE — 87205 SMEAR GRAM STAIN: CPT | Performed by: PHYSICIAN ASSISTANT

## 2021-11-15 PROCEDURE — 250N000013 HC RX MED GY IP 250 OP 250 PS 637: Performed by: PHYSICAL MEDICINE & REHABILITATION

## 2021-11-15 PROCEDURE — 250N000013 HC RX MED GY IP 250 OP 250 PS 637: Performed by: PHYSICIAN ASSISTANT

## 2021-11-15 PROCEDURE — 36415 COLL VENOUS BLD VENIPUNCTURE: CPT | Performed by: PHYSICIAN ASSISTANT

## 2021-11-15 PROCEDURE — 258N000003 HC RX IP 258 OP 636: Performed by: PHYSICIAN ASSISTANT

## 2021-11-15 PROCEDURE — 87040 BLOOD CULTURE FOR BACTERIA: CPT | Performed by: PHYSICIAN ASSISTANT

## 2021-11-15 PROCEDURE — 99233 SBSQ HOSP IP/OBS HIGH 50: CPT | Performed by: PHYSICAL MEDICINE & REHABILITATION

## 2021-11-15 PROCEDURE — 86140 C-REACTIVE PROTEIN: CPT | Performed by: PHYSICIAN ASSISTANT

## 2021-11-15 PROCEDURE — 999N000127 HC STATISTIC PERIPHERAL IV START W US GUIDANCE

## 2021-11-15 PROCEDURE — 250N000011 HC RX IP 250 OP 636: Performed by: PHYSICIAN ASSISTANT

## 2021-11-15 PROCEDURE — 87040 BLOOD CULTURE FOR BACTERIA: CPT | Performed by: PHYSICAL MEDICINE & REHABILITATION

## 2021-11-15 PROCEDURE — 128N000003 HC R&B REHAB

## 2021-11-15 PROCEDURE — 85027 COMPLETE CBC AUTOMATED: CPT | Performed by: PHYSICIAN ASSISTANT

## 2021-11-15 RX ORDER — OXYMETAZOLINE HYDROCHLORIDE 0.05 G/100ML
2 SPRAY NASAL 2 TIMES DAILY PRN
Status: DISCONTINUED | OUTPATIENT
Start: 2021-11-15 | End: 2021-11-24 | Stop reason: HOSPADM

## 2021-11-15 RX ORDER — PIPERACILLIN SODIUM, TAZOBACTAM SODIUM 4; .5 G/20ML; G/20ML
4.5 INJECTION, POWDER, LYOPHILIZED, FOR SOLUTION INTRAVENOUS EVERY 6 HOURS
Status: DISCONTINUED | OUTPATIENT
Start: 2021-11-15 | End: 2021-11-22

## 2021-11-15 RX ORDER — GUAIFENESIN 600 MG/1
600 TABLET, EXTENDED RELEASE ORAL 2 TIMES DAILY PRN
Status: DISCONTINUED | OUTPATIENT
Start: 2021-11-15 | End: 2021-11-17

## 2021-11-15 RX ADMIN — CLOPIDOGREL BISULFATE 75 MG: 75 TABLET, FILM COATED ORAL at 08:28

## 2021-11-15 RX ADMIN — ATORVASTATIN CALCIUM 40 MG: 40 TABLET, FILM COATED ORAL at 20:29

## 2021-11-15 RX ADMIN — SODIUM CHLORIDE 1000 ML: 9 INJECTION, SOLUTION INTRAVENOUS at 20:12

## 2021-11-15 RX ADMIN — ACETAMINOPHEN 650 MG: 325 TABLET, FILM COATED ORAL at 17:49

## 2021-11-15 RX ADMIN — Medication 1 ML: at 12:51

## 2021-11-15 RX ADMIN — BENZOCAINE AND MENTHOL 1 LOZENGE: 15; 3.6 LOZENGE ORAL at 06:42

## 2021-11-15 RX ADMIN — ASPIRIN 325 MG ORAL TABLET 325 MG: 325 PILL ORAL at 08:28

## 2021-11-15 RX ADMIN — ENALAPRIL MALEATE 20 MG: 5 TABLET ORAL at 08:29

## 2021-11-15 RX ADMIN — SENNOSIDES AND DOCUSATE SODIUM 1 TABLET: 8.6; 5 TABLET ORAL at 20:30

## 2021-11-15 RX ADMIN — Medication 1 ML: at 20:59

## 2021-11-15 RX ADMIN — Medication 5 MG: at 20:30

## 2021-11-15 RX ADMIN — MIRTAZAPINE 15 MG: 7.5 TABLET, FILM COATED ORAL at 20:29

## 2021-11-15 RX ADMIN — SENNOSIDES AND DOCUSATE SODIUM 1 TABLET: 8.6; 5 TABLET ORAL at 08:28

## 2021-11-15 RX ADMIN — METFORMIN HYDROCHLORIDE 1000 MG: 500 TABLET ORAL at 08:28

## 2021-11-15 RX ADMIN — PIPERACILLIN AND TAZOBACTAM 4.5 G: 4; .5 INJECTION, POWDER, FOR SOLUTION INTRAVENOUS at 20:11

## 2021-11-15 RX ADMIN — METFORMIN HYDROCHLORIDE 1000 MG: 500 TABLET ORAL at 17:34

## 2021-11-15 RX ADMIN — Medication 1 ML: at 18:09

## 2021-11-15 RX ADMIN — PANTOPRAZOLE SODIUM 40 MG: 40 TABLET, DELAYED RELEASE ORAL at 06:42

## 2021-11-15 RX ADMIN — GUAIFENESIN 600 MG: 600 TABLET, EXTENDED RELEASE ORAL at 12:50

## 2021-11-15 RX ADMIN — INSULIN ASPART 1 UNITS: 100 INJECTION, SOLUTION INTRAVENOUS; SUBCUTANEOUS at 08:21

## 2021-11-15 RX ADMIN — INSULIN ASPART 1 UNITS: 100 INJECTION, SOLUTION INTRAVENOUS; SUBCUTANEOUS at 17:34

## 2021-11-15 RX ADMIN — BENZOCAINE AND MENTHOL 1 LOZENGE: 15; 3.6 LOZENGE ORAL at 03:16

## 2021-11-15 ASSESSMENT — ACTIVITIES OF DAILY LIVING (ADL)
ADLS_ACUITY_SCORE: 14
ADLS_ACUITY_SCORE: 13
ADLS_ACUITY_SCORE: 11
ADLS_ACUITY_SCORE: 14
ADLS_ACUITY_SCORE: 11
ADLS_ACUITY_SCORE: 13
ADLS_ACUITY_SCORE: 14
ADLS_ACUITY_SCORE: 14
ADLS_ACUITY_SCORE: 13
ADLS_ACUITY_SCORE: 11
ADLS_ACUITY_SCORE: 11
ADLS_ACUITY_SCORE: 14
ADLS_ACUITY_SCORE: 11
ADLS_ACUITY_SCORE: 14
ADLS_ACUITY_SCORE: 14
ADLS_ACUITY_SCORE: 11
ADLS_ACUITY_SCORE: 11
ADLS_ACUITY_SCORE: 14
ADLS_ACUITY_SCORE: 14
ADLS_ACUITY_SCORE: 13

## 2021-11-15 ASSESSMENT — MIFFLIN-ST. JEOR: SCORE: 1576.74

## 2021-11-15 NOTE — PLAN OF CARE
FOCUS/GOAL  Medication management and Safety management     ASSESSMENT, INTERVENTIONS AND CONTINUING PLAN FOR GOAL:  Pt Aox4, able to make needs known.  CGA1 with walker/GB. Denies pain, cough, sob, chest pain, dizziness, N/V, N/T. Pt has a new sore throat and runny nose starting on day shift, Covid swab taken and was negative, PRN cepacol ordered.    Pt is diabetic, insulin given per order.  Cont of B&B, LBM 11/13. Reg/thin diet, encouraging fluids, taking pills whole with water.  Working with therapies.  Nursing to continue with POC.

## 2021-11-15 NOTE — PLAN OF CARE
"FOCUS/GOAL  Medical management    ASSESSMENT, INTERVENTIONS AND CONTINUING PLAN FOR GOAL:  Pt is alert and oriented. No complaints of pain. CGA with walker, but not oob this shift. Continent of bladder using urinal independently with staff emptying. Pt complaining of sore throat and \"cold\" symptoms. PRN lozenge given at beginning of shift. A couple hours later, NA informed RN that pt was upset with only receiving a lozenge. Returned to pt and he stated \"I'ts crazy I'm in a hospital and all I get is a lozenge. Shouldn't a hospital have anything better than that since I can get over the counter medication at home? Maybe it's psychological, I don't know\". RN informed pt that there was no other cold medications ordered for RN to administer. When inquiring what other medication pt was wanting, he stated \"I don't know, something!\". RN told pt in order to get other medication, a MD needed to be paged and asked. Pt declined and stated he would wait until the morning. Requested and received a 2 more lozenges throughout shift.  "

## 2021-11-15 NOTE — CONSULTS
Stroke Education Note  Patient stated he is recovering from an upper respiratory infection, feels drowsy, but would like to proceed with the class. He says he would like a review of the education material before discharge, if possible.     The following information has been reviewed with the patient:    1. Warning signs of stroke    2. Calling 911 if having warning signs of stroke    3. All modifiable risk factors: hypertension, CAD, atrial fib, diabetes, hypercholesterolemia, smoking, substance abuse, diet, physical inactivity, obesity, sleep apnea.    4. Patient's risk factors for stroke which include: DM2, HTN, HLD    5. Follow-up plan for after discharge    6. Discharge medications which include: ASA, lipitor, plavix, metformin, enalapril    In addition, the above information was given to the patient in writing as a part of the [facility] Stroke Class Handout.    Learner's response to risk factors / lifestyle modification education: NA - Precontemplative     Maya Pearson RN

## 2021-11-15 NOTE — PLAN OF CARE
FOCUS/GOAL  Bowel management, Bladder management, Medication management, and Medical management    ASSESSMENT, INTERVENTIONS AND CONTINUING PLAN FOR GOAL:  Pt has been continent of bladder this shift. LBM 11/14. Pt reported not feeling well including sore throat and intermittent runny nose. Provider ordered chest xray, influenza test, and several PRN medications. Educated pt on new orders. Collected and sent flu swab. See results review for more information. Pt requested and received PRN cough medications x1 with some relief. Pt sent to chest xray near end of shift. Denied pain throughout shift. Will continue with POC.

## 2021-11-16 LAB
ANION GAP SERPL CALCULATED.3IONS-SCNC: 3 MMOL/L (ref 3–14)
BASOPHILS # BLD AUTO: 0 10E3/UL (ref 0–0.2)
BASOPHILS NFR BLD AUTO: 0 %
BUN SERPL-MCNC: 11 MG/DL (ref 7–30)
CALCIUM SERPL-MCNC: 8.7 MG/DL (ref 8.5–10.1)
CHLORIDE BLD-SCNC: 110 MMOL/L (ref 94–109)
CO2 SERPL-SCNC: 26 MMOL/L (ref 20–32)
CREAT SERPL-MCNC: 1.19 MG/DL (ref 0.66–1.25)
CRP SERPL-MCNC: 78 MG/L (ref 0–8)
EOSINOPHIL # BLD AUTO: 0.4 10E3/UL (ref 0–0.7)
EOSINOPHIL NFR BLD AUTO: 4 %
ERYTHROCYTE [DISTWIDTH] IN BLOOD BY AUTOMATED COUNT: 11.9 % (ref 10–15)
GFR SERPL CREATININE-BSD FRML MDRD: 58 ML/MIN/1.73M2
GLUCOSE BLD-MCNC: 148 MG/DL (ref 70–99)
GLUCOSE BLDC GLUCOMTR-MCNC: 117 MG/DL (ref 70–99)
GLUCOSE BLDC GLUCOMTR-MCNC: 148 MG/DL (ref 70–99)
GLUCOSE BLDC GLUCOMTR-MCNC: 170 MG/DL (ref 70–99)
GLUCOSE BLDC GLUCOMTR-MCNC: 171 MG/DL (ref 70–99)
HCT VFR BLD AUTO: 39.2 % (ref 40–53)
HGB BLD-MCNC: 13.2 G/DL (ref 13.3–17.7)
IMM GRANULOCYTES # BLD: 0.1 10E3/UL
IMM GRANULOCYTES NFR BLD: 1 %
LYMPHOCYTES # BLD AUTO: 1.1 10E3/UL (ref 0.8–5.3)
LYMPHOCYTES NFR BLD AUTO: 10 %
MCH RBC QN AUTO: 30.4 PG (ref 26.5–33)
MCHC RBC AUTO-ENTMCNC: 33.7 G/DL (ref 31.5–36.5)
MCV RBC AUTO: 90 FL (ref 78–100)
MONOCYTES # BLD AUTO: 1.1 10E3/UL (ref 0–1.3)
MONOCYTES NFR BLD AUTO: 10 %
NEUTROPHILS # BLD AUTO: 8.2 10E3/UL (ref 1.6–8.3)
NEUTROPHILS NFR BLD AUTO: 75 %
NRBC # BLD AUTO: 0 10E3/UL
NRBC BLD AUTO-RTO: 0 /100
PLATELET # BLD AUTO: 129 10E3/UL (ref 150–450)
POTASSIUM BLD-SCNC: 4.3 MMOL/L (ref 3.4–5.3)
PROCALCITONIN SERPL-MCNC: 0.07 NG/ML
RBC # BLD AUTO: 4.34 10E6/UL (ref 4.4–5.9)
SODIUM SERPL-SCNC: 139 MMOL/L (ref 133–144)
WBC # BLD AUTO: 10.9 10E3/UL (ref 4–11)

## 2021-11-16 PROCEDURE — 80048 BASIC METABOLIC PNL TOTAL CA: CPT | Performed by: PHYSICIAN ASSISTANT

## 2021-11-16 PROCEDURE — 999N000007 HC SITE CHECK

## 2021-11-16 PROCEDURE — 36415 COLL VENOUS BLD VENIPUNCTURE: CPT | Performed by: PHYSICIAN ASSISTANT

## 2021-11-16 PROCEDURE — 87581 M.PNEUMON DNA AMP PROBE: CPT | Performed by: PHYSICIAN ASSISTANT

## 2021-11-16 PROCEDURE — 86140 C-REACTIVE PROTEIN: CPT | Performed by: PHYSICIAN ASSISTANT

## 2021-11-16 PROCEDURE — 85004 AUTOMATED DIFF WBC COUNT: CPT | Performed by: PHYSICIAN ASSISTANT

## 2021-11-16 PROCEDURE — 250N000013 HC RX MED GY IP 250 OP 250 PS 637: Performed by: PHYSICIAN ASSISTANT

## 2021-11-16 PROCEDURE — 258N000003 HC RX IP 258 OP 636: Performed by: PHYSICIAN ASSISTANT

## 2021-11-16 PROCEDURE — 84145 PROCALCITONIN (PCT): CPT | Performed by: PHYSICIAN ASSISTANT

## 2021-11-16 PROCEDURE — 99233 SBSQ HOSP IP/OBS HIGH 50: CPT | Performed by: PHYSICAL MEDICINE & REHABILITATION

## 2021-11-16 PROCEDURE — 250N000011 HC RX IP 250 OP 636: Performed by: PHYSICIAN ASSISTANT

## 2021-11-16 PROCEDURE — 128N000003 HC R&B REHAB

## 2021-11-16 PROCEDURE — 250N000013 HC RX MED GY IP 250 OP 250 PS 637: Performed by: STUDENT IN AN ORGANIZED HEALTH CARE EDUCATION/TRAINING PROGRAM

## 2021-11-16 RX ADMIN — CLOPIDOGREL BISULFATE 75 MG: 75 TABLET, FILM COATED ORAL at 08:52

## 2021-11-16 RX ADMIN — ACETAMINOPHEN 650 MG: 325 TABLET, FILM COATED ORAL at 09:22

## 2021-11-16 RX ADMIN — INSULIN ASPART 1 UNITS: 100 INJECTION, SOLUTION INTRAVENOUS; SUBCUTANEOUS at 13:10

## 2021-11-16 RX ADMIN — PIPERACILLIN AND TAZOBACTAM 4.5 G: 4; .5 INJECTION, POWDER, FOR SOLUTION INTRAVENOUS at 08:50

## 2021-11-16 RX ADMIN — MIRTAZAPINE 15 MG: 7.5 TABLET, FILM COATED ORAL at 20:48

## 2021-11-16 RX ADMIN — METFORMIN HYDROCHLORIDE 1000 MG: 500 TABLET ORAL at 17:09

## 2021-11-16 RX ADMIN — PIPERACILLIN AND TAZOBACTAM 4.5 G: 4; .5 INJECTION, POWDER, FOR SOLUTION INTRAVENOUS at 01:30

## 2021-11-16 RX ADMIN — SODIUM CHLORIDE 1000 ML: 9 INJECTION, SOLUTION INTRAVENOUS at 10:42

## 2021-11-16 RX ADMIN — ATORVASTATIN CALCIUM 40 MG: 40 TABLET, FILM COATED ORAL at 20:48

## 2021-11-16 RX ADMIN — PIPERACILLIN AND TAZOBACTAM 4.5 G: 4; .5 INJECTION, POWDER, FOR SOLUTION INTRAVENOUS at 14:28

## 2021-11-16 RX ADMIN — ENALAPRIL MALEATE 20 MG: 5 TABLET ORAL at 08:52

## 2021-11-16 RX ADMIN — PIPERACILLIN AND TAZOBACTAM 4.5 G: 4; .5 INJECTION, POWDER, FOR SOLUTION INTRAVENOUS at 20:15

## 2021-11-16 RX ADMIN — SENNOSIDES AND DOCUSATE SODIUM 1 TABLET: 8.6; 5 TABLET ORAL at 20:48

## 2021-11-16 RX ADMIN — INSULIN ASPART 1 UNITS: 100 INJECTION, SOLUTION INTRAVENOUS; SUBCUTANEOUS at 09:13

## 2021-11-16 RX ADMIN — INSULIN ASPART 1 UNITS: 100 INJECTION, SOLUTION INTRAVENOUS; SUBCUTANEOUS at 17:26

## 2021-11-16 RX ADMIN — ASPIRIN 325 MG ORAL TABLET 325 MG: 325 PILL ORAL at 08:52

## 2021-11-16 RX ADMIN — DOCUSATE SODIUM AND SENNOSIDES 1 TABLET: 8.6; 5 TABLET ORAL at 06:21

## 2021-11-16 RX ADMIN — SENNOSIDES AND DOCUSATE SODIUM 1 TABLET: 8.6; 5 TABLET ORAL at 08:51

## 2021-11-16 RX ADMIN — Medication 1 ML: at 00:08

## 2021-11-16 RX ADMIN — PANTOPRAZOLE SODIUM 40 MG: 40 TABLET, DELAYED RELEASE ORAL at 06:21

## 2021-11-16 RX ADMIN — BENZOCAINE AND MENTHOL 1 LOZENGE: 15; 3.6 LOZENGE ORAL at 22:27

## 2021-11-16 ASSESSMENT — ACTIVITIES OF DAILY LIVING (ADL)
ADLS_ACUITY_SCORE: 11

## 2021-11-16 NOTE — PLAN OF CARE
Pt alert and oriented. Cooperative. No s/s of chest pain and SOB noted. Productive cough at times. Throat spray given as ordered, effective for sore throat. Continent with bladder, using urinal. LBM 11/13 and PRN senna taken as ordered. Temperature 99.2, /93; no c/o headache. Call light in reach and bed alarm on for safety. Hourly rounds. Sleeps well. Will continue POC.

## 2021-11-16 NOTE — PLAN OF CARE
-60 mins pt refused OT, checked w/ nsg and pt being treated for pnuemonia and per charge RN, pt is negative for Covid.    -45 mins OT PM session, continues to be medical status/ill per nsg.

## 2021-11-16 NOTE — PROGRESS NOTES
"  [facility]   Acute Rehabilitation Unit  Daily progress note    INTERVAL HISTORY  Jose was seen and examined at bedside this morning.  No acute events reported overnight.  Patient reports that he did not get a very good night of sleep due to ongoing sore throat and intermittent coughing.  He reports that he is feeling slightly better this morning.  He notes sore throat to be most bothersome.  He states that cough is infrequent, but comes in fits.  He continues to have dull headache, congestion, malaise, poor appetite.  He denies feeling feverish/chilled, loss of taste/smell, myalgias/arthralgias.  Also denies shortness of breath.    Functionally, therapy on hold yesterday due to not feeling well.  He previously was needing contact guard assist for stand pivot tranfers, standby assist for ambulation >250' with Ustep walker.    MEDICATIONS    sodium chloride 0.9%  1,000 mL Intravenous Once     aspirin  325 mg Oral Daily     atorvastatin  40 mg Oral QPM     clopidogrel  75 mg Oral Daily     enalapril  20 mg Oral Daily     influenza vac high-dose quad  0.7 mL Intramuscular Prior to discharge     insulin aspart  1-7 Units Subcutaneous TID AC     insulin aspart  1-5 Units Subcutaneous At Bedtime     metFORMIN  1,000 mg Oral BID w/meals     mirtazapine  15 mg Oral At Bedtime     pantoprazole  40 mg Oral QAM AC     piperacillin-tazobactam  4.5 g Intravenous Q6H     polyethylene glycol  17 g Oral Daily     senna-docusate  1 tablet Oral BID     sodium chloride (PF)  3 mL Intracatheter Q8H        acetaminophen, sore throat lozenge, bisacodyl, glucose **OR** dextrose **OR** glucagon, guaiFENesin, melatonin, oxymetazoline, phenol, senna-docusate, sodium chloride (PF)     PHYSICAL EXAM  BP (!) 159/88   Pulse 101   Temp 99.2  F (37.3  C) (Oral)   Resp 20   Ht 1.778 m (5' 10\")   Wt 85 kg (187 lb 8 oz)   SpO2 94%   BMI 26.90 kg/m     Gen: NAD but appears to not feel well, lying in bed, " feels slightly diaphoretic/clammy  HEENT: NC/AT, oropharynx erythematous but without exudate  Cardio: RRR, +ectopic beats, no murmurs  Pulm: non-labored on room air, O2 sats 93-94% on room air, crackles in bilateral bases R>L  Abd: soft, non-tender, non-distended, bowel sounds present  Ext: no edema in bilateral lower extremities  Neuro/MSK: AAOx4, follows commands, mild facial droop on L    LABS  CBC RESULTS:   Recent Labs   Lab Test 11/16/21  0603 11/15/21  0646 11/11/21  0645   WBC 10.9 11.6* 6.3   RBC 4.34* 4.72 4.62   HGB 13.2* 14.3 13.8   HCT 39.2* 42.3 41.7   MCV 90 90 90   MCH 30.4 30.3 29.9   MCHC 33.7 33.8 33.1   RDW 11.9 11.7 11.9   * 145* 152     Last Basic Metabolic Panel:  Recent Labs   Lab Test 11/16/21  0904 11/16/21  0603 11/15/21  2056 11/15/21  0805 11/15/21  0646 11/11/21  0725 11/11/21  0645   NA  --  139  --   --  136  --  135   POTASSIUM  --  4.3  --   --  4.3  --  4.7   CHLORIDE  --  110*  --   --  102  --  106   CO2  --  26  --   --  26  --  28   ANIONGAP  --  3  --   --  8  --  1*   * 148* 148*   < > 150*   < > 113*   BUN  --  11  --   --  9  --  14   CR  --  1.19  --   --  1.02  --  1.02   GFRESTIMATED  --  58*  --   --  70  --  70   LEON  --  8.7  --   --  8.9  --  8.7    < > = values in this interval not displayed.       Rehabilitation - continue comprehensive acute inpatient rehabilitation program with multidisciplinary approach including therapies, rehab nursing, and physiatry following. See interval history for updates.      ASSESSMENT AND PLAN  Jose Mallory is a 76 year old right hand dominant male with a past medical history of DM2, HTN, HLD, GERD, posterior fossa arachnoid cyst, peripheral neuropathy, prior R pontine stroke, gait instability, and bilateral knee replacement who was admitted on 10/26/21 with progressive lower extremity weakness and gait difficulties, found to have subacute left pontine stroke with course complicated by neuropathy.  He is now admitted  to ARU on 10/29 for multidisciplinary rehabilitation and ongoing medical management.     #Subacute L pontine stroke  #Subacute to chronic R pontine stroke  #Gait instability  #Arachnoid cyst  #Moderate cervical canal stenosis with disc herniation most pronounced at C3-C4, C4-C5   MRI with subacute cerebral infarction in left ventral cookie.  TTE EF 60-65%, no significant valvular disease or cardiac source for embolus.  Telemetry with NSR and no evidence of atrial fibrillation.  IV tPA was not initiated due to unclear or unfavorable risk-benefit profile for extended window thrombolysis beyond the conventional 4.5 hour time window.  Etiology of stroke thought to be atherosclerotic. Gait instability suspected to be multifactorial including peripheral neuropathy, cerebellar disease, and recent bilateral pontine strokes.  - Continue DAPT with Aspirin 325 mg daily and Plavix 75 mg daily x90 days, then stop Plavix and continue Aspirin 325 mg daily only  - High intensity statin (atorvastatin 40 mg daily) to target LDL <70  - HbA1c at goal (6.3%)  - Long-term BP goal to 120/80  - PT noting festinating gait, very slow gait, intermittent freezing, cogwheeling, difficulty initiating motor tasks.  Plan to follow-up with neurology  - Continue PT/OT  - Outpatient sleep evaluation for SELIN  - Follow up with neurology in 4-6 weeks  - Per hospital discharge summary, if gait has not improved after the pt's stay at acute rehab and/or by the time there is neurology follow up in the outpatient setting, please consider additional neurosurgery consultation.     #Peripheral neuropathy  #Decreased vibration sensation bilaterally in lower extremities  Per neurology, suspect that large fiber sensory dysfunction secondary to diabetes.  Vitamin B12/folate WNL, NEHA negative, HIV non reactive, RPR negative.  Copper, zinc WNL.  Protein electrophoresis with slightly elevated alpha 2, per pathologist, essentially normal, no obvious monoclonal proteins.   Homocysteine WNL, MMA WNL.  - Follow up with neurology for EMG     #HTN  [PTA enalapril 5mg PO daily]  Enalapril increased to 10 mg daily on 11/2 due to generally elevated BP, increased again to 20 mg daily on 11/13.  - BP improved although still intermittently elevated  - Continue enalapril 20 mg daily  - Continue to monitor BP, adjust meds as indicated     #DMII  [PTA meds: metformin 1000 mg BID, glipizide 10 mg daily]  A1c 6.3%.  Patient reports compliance with medications, but that he was not taking his blood glucose at home as directed.    - Monitor blood glucose TID AC and HS.  BG currently 128-185.  - Continue PTA metformin 1g BID  - PTA glipizide 10 mg held throughout inpatient admission.  BG slightly elevated but also with variable intake.  Resumed at low dose 2.5 mg daily 11/2 but with mild hypoglycemia (66), discontinued until intake improved.  - Hypoglycemia protocol  - Will need new glucometer and supplies, diabetes educator consulted    Cr uptrending  Poor intake  0.86 -> 1.03 -> 1.13 on 11/3.  Poor oral fluid intake, also recently increased dose of enalapril. Patient agreeable to increasing oral fluids.  Improved.  However, recently decreased in the setting of impaired intake and not feeling well.  S/p 1L IV fluids 11/15 given tachycardia, poor intake.  - Encourage fluids  - BMP today with Cr slightly increased to 1.19 (1.02).  Repeat 1L NS bolus.    Constipation  No BM for several days on admission, then with episode of emesis 10/31 and loose stools x2, now with recurrent constipation.   Receiving Miralax and Senokot-S.  Refused suppository 11/3.  Added prune juice.  Has refused suppository x2 days.  Feeling distended/full.  No recurrent nausea/vomiting.  Bowel sounds present, passing gas.  AXR on 11/5 with no significant colonic stool burden, non-obstructive bowel gas pattern.  Did have small BM on 11/5.  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  He notes decreased interest in food over the past year or so.  He has been sleeping poorly this admission.  Struggling with being away from family as well.  - Psych consult completed 11/5, appreciate assistance  - Remeron 7.5 mg started on 11/5 for mood, appetite, sleep.  Patient states that he is sleeping better.  Ongoing issues with depressed mood, but also with significant social stressors due to wife's illness.  Appetite remains impaired.  Increased to 15 mg daily on 11/9.  RD noting weight stabilized, intake improved.    Suspected HAP  Reported onset of symptoms over the weekend with \"very\" sore throat, headache, nasal congestion, mild productive cough.  VSS, temp mildly elevated at 99.5F, mildly tachycardic. Oxygen sats mid 90s on room air.  Labs 11/15: slightly elevated WBC (11.6), CRP mildly elevated at 19 (prior 12), procal 0.09.  COVID (-) on 11/14.  Influenza (-) 11/15.  CXR revealed dense retrocardiac opacities, suspicious for infection.  Started on IV Zosyn and given 1L NS bolus.    - Continue comfort measures with PRN Tylenol, throat lozenge/spray, Afrin nasal spray, Mucinex  - This morning feeling slightly better.  Remains afebrile.  O2 sats 93-94% on room air.  Slight tachycardia.  WBC normalized at 10.9, CRP further elevated to 78 (19), procal stable at 0.07.  Blood culture no growth at 12 hrs.  Sputum culture in process.  - Discussed with hospitalist, advised to continue current plan of care, repeat fluids and possible repeat COVID test as below.  Will consider formal consult if clinical worsens or not improving on current course.  - Obtain respiratory viral panel  - Repeat 1L NS IV  - Consider repeat COVID PCR tomorrow if no improvement/ongoing sore throat or if immersion of overt COVID " symptoms  - Monitor fever curve, repeat labs tomorrow      1. Adjustment to disability:  Psych consulted completed, Remeron trial started 11/5, dose increased 11/9, continue to monitor.  2. FEN: regular diet, thin liquids  3. Bowel: continent, constipation as above, continue to monitor  4. Bladder: continent, PVRs at admission x3 <100, ok to monitor PRN only.    5. DVT Prophylaxis: mechanical  6. GI Prophylaxis: PPI given DAPT + age  7. Code: full, confirmed on admission  8. Disposition: goal for home.   9. ELOS: 3 weeks  10. Follow up Appointments on Discharge: PCP, stroke and general neurology         Patient discussed with Dr. Fredis Ordonez, PM&R staff physician       Bozena Bower PA-C  Physical Medicine & Rehabilitation

## 2021-11-16 NOTE — PLAN OF CARE
FOCUS/GOAL  Medical management    ASSESSMENT, INTERVENTIONS AND CONTINUING PLAN FOR GOAL:  Pt A&Ox4. Baseline forgetfulness. A1 with walker. Pt continent of urine, using urinal. LBM 11/13, pt declined miralax but had scheduled senna. Pt had PRN tylenol for fever, temperature reduced. Respiratory sample taken, results pending. Pt denies pain, very sleepy during shift and skipped all therapies on shift. Pt received IV bolus, 1L.IV abx q6h.

## 2021-11-16 NOTE — PROGRESS NOTES
Pt A&O x4. Cont B&B. Chest xray completed and pt started on IV anitbiotic and 1L bolus sodium chloride to R wrist PIV. Awaiting results from sputum culture and blood culture. Throat spray effective for sore throat. Shower provided this shift. Pt reported feeling better this evening and ate 75% dinner. Low grade temp early in shift that had resolved by HS. Tylenol admin for headache.; effective. Melatonin admin at HS. Blood glucose 158 and 148. Cont B&B; LBM 11/13. Uses urinal indepen. Alarms activated, call light within reach.

## 2021-11-17 ENCOUNTER — APPOINTMENT (OUTPATIENT)
Dept: PHYSICAL THERAPY | Facility: CLINIC | Age: 76
DRG: 056 | End: 2021-11-17
Attending: PHYSICAL MEDICINE & REHABILITATION
Payer: MEDICARE

## 2021-11-17 ENCOUNTER — APPOINTMENT (OUTPATIENT)
Dept: OCCUPATIONAL THERAPY | Facility: CLINIC | Age: 76
DRG: 056 | End: 2021-11-17
Attending: PHYSICAL MEDICINE & REHABILITATION
Payer: MEDICARE

## 2021-11-17 LAB
ANION GAP SERPL CALCULATED.3IONS-SCNC: 1 MMOL/L (ref 3–14)
BASOPHILS # BLD AUTO: 0 10E3/UL (ref 0–0.2)
BASOPHILS NFR BLD AUTO: 0 %
BUN SERPL-MCNC: 11 MG/DL (ref 7–30)
C PNEUM DNA SPEC QL NAA+PROBE: NOT DETECTED
CALCIUM SERPL-MCNC: 8.6 MG/DL (ref 8.5–10.1)
CHLORIDE BLD-SCNC: 112 MMOL/L (ref 94–109)
CO2 SERPL-SCNC: 29 MMOL/L (ref 20–32)
CREAT SERPL-MCNC: 1.11 MG/DL (ref 0.66–1.25)
CRP SERPL-MCNC: 74 MG/L (ref 0–8)
EOSINOPHIL # BLD AUTO: 0.7 10E3/UL (ref 0–0.7)
EOSINOPHIL NFR BLD AUTO: 9 %
ERYTHROCYTE [DISTWIDTH] IN BLOOD BY AUTOMATED COUNT: 11.9 % (ref 10–15)
FLUAV H1 2009 PAND RNA SPEC QL NAA+PROBE: NOT DETECTED
FLUAV H1 RNA SPEC QL NAA+PROBE: NOT DETECTED
FLUAV H3 RNA SPEC QL NAA+PROBE: NOT DETECTED
FLUAV RNA SPEC QL NAA+PROBE: NOT DETECTED
FLUBV RNA SPEC QL NAA+PROBE: NOT DETECTED
GFR SERPL CREATININE-BSD FRML MDRD: 63 ML/MIN/1.73M2
GLUCOSE BLD-MCNC: 123 MG/DL (ref 70–99)
GLUCOSE BLDC GLUCOMTR-MCNC: 118 MG/DL (ref 70–99)
GLUCOSE BLDC GLUCOMTR-MCNC: 134 MG/DL (ref 70–99)
GLUCOSE BLDC GLUCOMTR-MCNC: 165 MG/DL (ref 70–99)
HADV DNA SPEC QL NAA+PROBE: NOT DETECTED
HCOV PNL SPEC NAA+PROBE: NOT DETECTED
HCT VFR BLD AUTO: 39.4 % (ref 40–53)
HGB BLD-MCNC: 13.3 G/DL (ref 13.3–17.7)
HMPV RNA SPEC QL NAA+PROBE: NOT DETECTED
HPIV1 RNA SPEC QL NAA+PROBE: NOT DETECTED
HPIV2 RNA SPEC QL NAA+PROBE: NOT DETECTED
HPIV3 RNA SPEC QL NAA+PROBE: NOT DETECTED
HPIV4 RNA SPEC QL NAA+PROBE: NOT DETECTED
IMM GRANULOCYTES # BLD: 0 10E3/UL
IMM GRANULOCYTES NFR BLD: 0 %
LYMPHOCYTES # BLD AUTO: 1.4 10E3/UL (ref 0.8–5.3)
LYMPHOCYTES NFR BLD AUTO: 18 %
M PNEUMO DNA SPEC QL NAA+PROBE: NOT DETECTED
MCH RBC QN AUTO: 30.6 PG (ref 26.5–33)
MCHC RBC AUTO-ENTMCNC: 33.8 G/DL (ref 31.5–36.5)
MCV RBC AUTO: 91 FL (ref 78–100)
MONOCYTES # BLD AUTO: 0.6 10E3/UL (ref 0–1.3)
MONOCYTES NFR BLD AUTO: 8 %
NEUTROPHILS # BLD AUTO: 4.9 10E3/UL (ref 1.6–8.3)
NEUTROPHILS NFR BLD AUTO: 65 %
NRBC # BLD AUTO: 0 10E3/UL
NRBC BLD AUTO-RTO: 0 /100
PLATELET # BLD AUTO: 123 10E3/UL (ref 150–450)
POTASSIUM BLD-SCNC: 4.2 MMOL/L (ref 3.4–5.3)
PROCALCITONIN SERPL-MCNC: <0.05 NG/ML
RBC # BLD AUTO: 4.35 10E6/UL (ref 4.4–5.9)
RSV RNA SPEC QL NAA+PROBE: NOT DETECTED
RSV RNA SPEC QL NAA+PROBE: NOT DETECTED
RV+EV RNA SPEC QL NAA+PROBE: NOT DETECTED
SARS-COV-2 RNA RESP QL NAA+PROBE: NEGATIVE
SODIUM SERPL-SCNC: 142 MMOL/L (ref 133–144)
WBC # BLD AUTO: 7.6 10E3/UL (ref 4–11)

## 2021-11-17 PROCEDURE — 250N000011 HC RX IP 250 OP 636: Performed by: PHYSICIAN ASSISTANT

## 2021-11-17 PROCEDURE — 97750 PHYSICAL PERFORMANCE TEST: CPT | Mod: GP | Performed by: PHYSICAL THERAPIST

## 2021-11-17 PROCEDURE — 999N000007 HC SITE CHECK

## 2021-11-17 PROCEDURE — U0005 INFEC AGEN DETEC AMPLI PROBE: HCPCS | Performed by: PHYSICIAN ASSISTANT

## 2021-11-17 PROCEDURE — 97530 THERAPEUTIC ACTIVITIES: CPT | Mod: GO

## 2021-11-17 PROCEDURE — 99233 SBSQ HOSP IP/OBS HIGH 50: CPT | Performed by: PHYSICAL MEDICINE & REHABILITATION

## 2021-11-17 PROCEDURE — 97535 SELF CARE MNGMENT TRAINING: CPT | Mod: GO

## 2021-11-17 PROCEDURE — 97110 THERAPEUTIC EXERCISES: CPT | Mod: GP

## 2021-11-17 PROCEDURE — 36415 COLL VENOUS BLD VENIPUNCTURE: CPT | Performed by: PHYSICIAN ASSISTANT

## 2021-11-17 PROCEDURE — 999N000150 HC STATISTIC PT MED CONFERENCE < 30 MIN: Performed by: PHYSICAL THERAPIST

## 2021-11-17 PROCEDURE — 999N000125 HC STATISTIC PATIENT MED CONFERENCE < 30 MIN

## 2021-11-17 PROCEDURE — 84145 PROCALCITONIN (PCT): CPT | Performed by: PHYSICIAN ASSISTANT

## 2021-11-17 PROCEDURE — 80048 BASIC METABOLIC PNL TOTAL CA: CPT | Performed by: PHYSICIAN ASSISTANT

## 2021-11-17 PROCEDURE — 85025 COMPLETE CBC W/AUTO DIFF WBC: CPT | Performed by: PHYSICIAN ASSISTANT

## 2021-11-17 PROCEDURE — 128N000003 HC R&B REHAB

## 2021-11-17 PROCEDURE — 97110 THERAPEUTIC EXERCISES: CPT | Mod: GP | Performed by: PHYSICAL THERAPIST

## 2021-11-17 PROCEDURE — 86140 C-REACTIVE PROTEIN: CPT | Performed by: PHYSICIAN ASSISTANT

## 2021-11-17 PROCEDURE — 250N000013 HC RX MED GY IP 250 OP 250 PS 637: Performed by: PHYSICIAN ASSISTANT

## 2021-11-17 PROCEDURE — 258N000003 HC RX IP 258 OP 636

## 2021-11-17 RX ORDER — SODIUM CHLORIDE 9 MG/ML
INJECTION, SOLUTION INTRAVENOUS CONTINUOUS
Status: ACTIVE | OUTPATIENT
Start: 2021-11-17 | End: 2021-11-17

## 2021-11-17 RX ORDER — SODIUM CHLORIDE 9 MG/ML
INJECTION, SOLUTION INTRAVENOUS
Status: COMPLETED
Start: 2021-11-17 | End: 2021-11-17

## 2021-11-17 RX ORDER — BENZONATATE 100 MG/1
100 CAPSULE ORAL 3 TIMES DAILY PRN
Status: DISCONTINUED | OUTPATIENT
Start: 2021-11-17 | End: 2021-11-18

## 2021-11-17 RX ORDER — LACTOBACILLUS RHAMNOSUS GG 10B CELL
1 CAPSULE ORAL 2 TIMES DAILY
Status: DISCONTINUED | OUTPATIENT
Start: 2021-11-17 | End: 2021-11-23

## 2021-11-17 RX ADMIN — PANTOPRAZOLE SODIUM 40 MG: 40 TABLET, DELAYED RELEASE ORAL at 06:47

## 2021-11-17 RX ADMIN — INSULIN ASPART 1 UNITS: 100 INJECTION, SOLUTION INTRAVENOUS; SUBCUTANEOUS at 12:39

## 2021-11-17 RX ADMIN — PIPERACILLIN AND TAZOBACTAM 4.5 G: 4; .5 INJECTION, POWDER, FOR SOLUTION INTRAVENOUS at 01:42

## 2021-11-17 RX ADMIN — SODIUM CHLORIDE: 9 INJECTION, SOLUTION INTRAVENOUS at 14:34

## 2021-11-17 RX ADMIN — METFORMIN HYDROCHLORIDE 1000 MG: 500 TABLET ORAL at 09:34

## 2021-11-17 RX ADMIN — SODIUM CHLORIDE 500 ML: 9 INJECTION, SOLUTION INTRAVENOUS at 09:37

## 2021-11-17 RX ADMIN — PIPERACILLIN AND TAZOBACTAM 4.5 G: 4; .5 INJECTION, POWDER, FOR SOLUTION INTRAVENOUS at 20:11

## 2021-11-17 RX ADMIN — SENNOSIDES AND DOCUSATE SODIUM 1 TABLET: 8.6; 5 TABLET ORAL at 20:11

## 2021-11-17 RX ADMIN — Medication 1 CAPSULE: at 14:33

## 2021-11-17 RX ADMIN — MIRTAZAPINE 15 MG: 7.5 TABLET, FILM COATED ORAL at 21:52

## 2021-11-17 RX ADMIN — CLOPIDOGREL BISULFATE 75 MG: 75 TABLET, FILM COATED ORAL at 09:33

## 2021-11-17 RX ADMIN — METFORMIN HYDROCHLORIDE 1000 MG: 500 TABLET ORAL at 17:22

## 2021-11-17 RX ADMIN — ENALAPRIL MALEATE 20 MG: 5 TABLET ORAL at 09:34

## 2021-11-17 RX ADMIN — ASPIRIN 325 MG ORAL TABLET 325 MG: 325 PILL ORAL at 09:33

## 2021-11-17 RX ADMIN — ATORVASTATIN CALCIUM 40 MG: 40 TABLET, FILM COATED ORAL at 20:11

## 2021-11-17 RX ADMIN — PIPERACILLIN AND TAZOBACTAM 4.5 G: 4; .5 INJECTION, POWDER, FOR SOLUTION INTRAVENOUS at 14:33

## 2021-11-17 RX ADMIN — SENNOSIDES AND DOCUSATE SODIUM 1 TABLET: 8.6; 5 TABLET ORAL at 09:33

## 2021-11-17 RX ADMIN — PIPERACILLIN AND TAZOBACTAM 4.5 G: 4; .5 INJECTION, POWDER, FOR SOLUTION INTRAVENOUS at 09:26

## 2021-11-17 RX ADMIN — Medication 1 CAPSULE: at 20:11

## 2021-11-17 ASSESSMENT — ACTIVITIES OF DAILY LIVING (ADL)
ADLS_ACUITY_SCORE: 11
ADLS_ACUITY_SCORE: 13
ADLS_ACUITY_SCORE: 11
ADLS_ACUITY_SCORE: 13
ADLS_ACUITY_SCORE: 11
ADLS_ACUITY_SCORE: 13
ADLS_ACUITY_SCORE: 11
ADLS_ACUITY_SCORE: 11
ADLS_ACUITY_SCORE: 13
ADLS_ACUITY_SCORE: 11
ADLS_ACUITY_SCORE: 13
ADLS_ACUITY_SCORE: 11
ADLS_ACUITY_SCORE: 13
ADLS_ACUITY_SCORE: 11
ADLS_ACUITY_SCORE: 11
ADLS_ACUITY_SCORE: 13
ADLS_ACUITY_SCORE: 11

## 2021-11-17 NOTE — PLAN OF CARE
FOCUS/GOAL  Medical management    ASSESSMENT, INTERVENTIONS AND CONTINUING PLAN FOR GOAL:  Pt A&Ox4. A1 with walker. Pt continent of B&B, LBM 11/16, used urinal at bedside. Pt denies pain on shift. PIV in R hand, q6h abx administered, continuous NS infusing as per ordered. Pt has infrequent cough, diminished lung sounds in bilateral upper lobes, denies SOB. COVID swab done per symptomatic covid order, pending results. Educated pt on how to use incentive spirometer, pt demonstrated correct use.

## 2021-11-17 NOTE — PLAN OF CARE
FOCUS/GOAL  Medical management    ASSESSMENT, INTERVENTIONS AND CONTINUING PLAN FOR GOAL:  Pt denies any pain/discomfort. He was noted having an intermittent productive cough. Requested and was given prn Cepacol for sore throat. VSS.. pt remains afebrile with a temp of 97.4, Lung sound diminished bilaterally.  & 117. PIV to the left hand is patent... dressing around is clean dry and intact. Zosyn 4.5 g antibiotic infused without any difficulty. Had a large bm this evening and voids without any difficulty using urinal. No concerns at this time.

## 2021-11-17 NOTE — PROGRESS NOTES
"  [facility]   Acute Rehabilitation Unit  Daily progress note    INTERVAL HISTORY  Jose was seen and examined at bedside this morning during team rounds.  No acute events reported overnight.  Patient reports that he is feeling better today.  He states that his sore throat is much improved.  He reports ongoing but improving cough, states some soreness in rib cage, especially when coughing.  He has improved headache as well.  He notes BMs have been slightly loose.  He denies any shortness of breath, fever/chills, nausea, abdominal pain, bladder concerns.  He also denies loss of taste or smell, myalgias/arthralgias.  He is feeling more up to therapy participation today.  He states appetite remains poor, though it has been throughout his stay and more similar to prior to his acute illness.      Functionally, he has not been able to participate in therapy for 2 days due to acute illness.  For full functional updates, see team rounds note from today.    MEDICATIONS    aspirin  325 mg Oral Daily     atorvastatin  40 mg Oral QPM     clopidogrel  75 mg Oral Daily     enalapril  20 mg Oral Daily     influenza vac high-dose quad  0.7 mL Intramuscular Prior to discharge     insulin aspart  1-7 Units Subcutaneous TID AC     insulin aspart  1-5 Units Subcutaneous At Bedtime     metFORMIN  1,000 mg Oral BID w/meals     mirtazapine  15 mg Oral At Bedtime     pantoprazole  40 mg Oral QAM AC     piperacillin-tazobactam  4.5 g Intravenous Q6H     polyethylene glycol  17 g Oral Daily     senna-docusate  1 tablet Oral BID     sodium chloride (PF)  3 mL Intracatheter Q8H        acetaminophen, sore throat lozenge, bisacodyl, glucose **OR** dextrose **OR** glucagon, guaiFENesin, melatonin, oxymetazoline, phenol, senna-docusate, sodium chloride (PF)     PHYSICAL EXAM  BP (!) 155/99 (BP Location: Left arm)   Pulse 105   Temp 97.8  F (36.6  C) (Oral)   Resp 20   Ht 1.778 m (5' 10\")   Wt 85 kg (187 lb 8 " oz)   SpO2 94%   BMI 26.90 kg/m     Gen: NAD  HEENT: NC/AT  Cardio: RRR, +ectopic beats, no murmurs  Pulm: non-labored on room air, crackles in L lower lung field, scattered rhonchi on right  Abd: soft, non-tender, non-distended, bowel sounds present  Ext: no edema in bilateral lower extremities  Neuro/MSK: AAOx4, follows commands, mild facial droop on L    LABS  CBC RESULTS:   Recent Labs   Lab Test 11/17/21  0645 11/16/21  0603 11/15/21  0646   WBC 7.6 10.9 11.6*   RBC 4.35* 4.34* 4.72   HGB 13.3 13.2* 14.3   HCT 39.4* 39.2* 42.3   MCV 91 90 90   MCH 30.6 30.4 30.3   MCHC 33.8 33.7 33.8   RDW 11.9 11.9 11.7   * 129* 145*     Last Basic Metabolic Panel:  Recent Labs   Lab Test 11/17/21  0645 11/16/21 2053 11/16/21  1720 11/16/21  0904 11/16/21  0603 11/15/21  0805 11/15/21  0646     --   --   --  139  --  136   POTASSIUM 4.2  --   --   --  4.3  --  4.3   CHLORIDE 112*  --   --   --  110*  --  102   CO2 29  --   --   --  26  --  26   ANIONGAP 1*  --   --   --  3  --  8   * 117* 170*   < > 148*   < > 150*   BUN 11  --   --   --  11  --  9   CR 1.11  --   --   --  1.19  --  1.02   GFRESTIMATED 63  --   --   --  58*  --  70   LEON 8.6  --   --   --  8.7  --  8.9    < > = values in this interval not displayed.       Rehabilitation - continue comprehensive acute inpatient rehabilitation program with multidisciplinary approach including therapies, rehab nursing, and physiatry following. See interval history for updates.      ASSESSMENT AND PLAN  Jose Mallory is a 76 year old right hand dominant male with a past medical history of DM2, HTN, HLD, GERD, posterior fossa arachnoid cyst, peripheral neuropathy, prior R pontine stroke, gait instability, and bilateral knee replacement who was admitted on 10/26/21 with progressive lower extremity weakness and gait difficulties, found to have subacute left pontine stroke with course complicated by neuropathy.  He is now admitted to ARU on 10/29 for  multidisciplinary rehabilitation and ongoing medical management.     #Subacute L pontine stroke  #Subacute to chronic R pontine stroke  #Gait instability  #Arachnoid cyst  #Moderate cervical canal stenosis with disc herniation most pronounced at C3-C4, C4-C5   MRI with subacute cerebral infarction in left ventral cookie.  TTE EF 60-65%, no significant valvular disease or cardiac source for embolus.  Telemetry with NSR and no evidence of atrial fibrillation.  IV tPA was not initiated due to unclear or unfavorable risk-benefit profile for extended window thrombolysis beyond the conventional 4.5 hour time window.  Etiology of stroke thought to be atherosclerotic. Gait instability suspected to be multifactorial including peripheral neuropathy, cerebellar disease, and recent bilateral pontine strokes.  - Continue DAPT with Aspirin 325 mg daily and Plavix 75 mg daily x90 days, then stop Plavix and continue Aspirin 325 mg daily only  - High intensity statin (atorvastatin 40 mg daily) to target LDL <70  - HbA1c at goal (6.3%)  - Long-term BP goal to 120/80  - PT noting festinating gait, very slow gait, intermittent freezing, cogwheeling, difficulty initiating motor tasks.  Plan to follow-up with neurology  - Continue PT/OT  - Outpatient sleep evaluation for SELIN  - Follow up with neurology in 4-6 weeks  - Per hospital discharge summary, if gait has not improved after the pt's stay at acute rehab and/or by the time there is neurology follow up in the outpatient setting, please consider additional neurosurgery consultation.     #Peripheral neuropathy  #Decreased vibration sensation bilaterally in lower extremities  Per neurology, suspect that large fiber sensory dysfunction secondary to diabetes.  Vitamin B12/folate WNL, NEHA negative, HIV non reactive, RPR negative.  Copper, zinc WNL.  Protein electrophoresis with slightly elevated alpha 2, per pathologist, essentially normal, no obvious monoclonal proteins.  Homocysteine WNL,  MMA WNL.  - Follow up with neurology for EMG     #HTN  [PTA enalapril 5mg PO daily]  Enalapril increased to 10 mg daily on 11/2 due to generally elevated BP, increased again to 20 mg daily on 11/13.  - BP improved although still intermittently elevated  - Continue enalapril 20 mg daily  - Continue to monitor BP, adjust meds as indicated     #DMII  [PTA meds: metformin 1000 mg BID, glipizide 10 mg daily]  A1c 6.3%.  Patient reports compliance with medications, but that he was not taking his blood glucose at home as directed.    - Monitor blood glucose TID AC and HS.  BG currently 117-171.  - Continue PTA metformin 1g BID  - PTA glipizide 10 mg held throughout inpatient admission.  BG slightly elevated but also with variable intake.  Resumed at low dose 2.5 mg daily 11/2 but with mild hypoglycemia (66), discontinued until intake improved.  - Hypoglycemia protocol  - Will need new glucometer and supplies, diabetes educator consulted    Cr uptrending  Poor intake  0.86 -> 1.03 -> 1.13 on 11/3.  Poor oral fluid intake, also recently increased dose of enalapril. Patient agreeable to increasing oral fluids.  Improved.  However, recently decreased in the setting of impaired intake and not feeling well.  S/p 1L IV fluids 11/15 given tachycardia, poor intake, repeat on 11/16.  - Encourage fluids  - BMP today with Cr slightly improved to 1.11 (1.19).  Tachycardia ongoing, will start continuous IV NS x100mL/hr x12 hours.  Repeat labs tomorrow but seems to be more alert, feeling better, and likely able to start improving oral intake.    Constipation  No BM for several days on admission, then with episode of emesis 10/31 and loose stools x2, now with recurrent constipation.   Receiving Miralax and Senokot-S.  Refused suppository 11/3.  Added prune juice.  Has refused suppository x2 days.  Feeling distended/full.  No recurrent nausea/vomiting.  Bowel sounds present, passing gas.  AXR on 11/5 with no significant colonic stool  "burden, non-obstructive bowel gas pattern.  Did have small BM on 11/5 and has been more regular since, also frequently going several days without.  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  He notes decreased interest in food over the past year or so.  He has been sleeping poorly this admission.  Struggling with being away from family as well.  - Psych consult completed 11/5, appreciate assistance  - Remeron 7.5 mg started on 11/5 for mood, appetite, sleep.  Patient states that he is sleeping better.  Ongoing issues with depressed mood, but also with significant social stressors due to wife's illness.  Appetite remains impaired.  Increased to 15 mg daily on 11/9.  RD noting weight stabilized, intake improved.    Suspected HAP  Reported onset of symptoms over the weekend with \"very\" sore throat, headache, nasal congestion, mild productive cough.  VSS, temp mildly elevated at 99.5F, mildly tachycardic. Oxygen sats mid 90s on room air.  Labs 11/15: slightly elevated WBC (11.6), CRP mildly elevated at 19 (prior 12), procal 0.09.  COVID (-) on 11/14.  Influenza (-) 11/15.  CXR revealed dense retrocardiac opacities, suspicious for infection.  Started on IV Zosyn and given 1L NS bolus.    - Continue comfort measures with PRN Tylenol, throat lozenge/spray, Afrin nasal spray, Mucinex  - 11/16: feeling slightly better.  Remains afebrile.  O2 sats 93-94% on room air.  Slight tachycardia.  WBC normalized at 10.9, CRP further elevated to 78 (19), procal stable at 0.07.  - Blood culture NGTD.  Sputum culture in process.  - Discussed with hospitalist 11/16, advised to continue current plan of care, repeat fluids and possible repeat COVID test as below.  Will consider formal " consult if clinical worsens or not improving on current course.  - Respiratory viral panel in process  - Today with ongoing but improved cough, headache, sore throat.  No dyspnea, loss of taste/smell.  Afebrile.  Remains slightly tachycardic and borderline tachypnic.  Labs with WBC WNL, CRP stable at 74, procal now <0.05.  - NS x100ml/hr IV x12 hours  - Repeat COVID PCR today given  - Monitor fever curve, repeat labs tomorrow      1. Adjustment to disability:  Psych consulted completed, Remeron trial started 11/5, dose increased 11/9, continue to monitor.  2. FEN: regular diet, thin liquids  3. Bowel: continent, constipation as above, continue to monitor  4. Bladder: continent, PVRs at admission x3 <100, ok to monitor PRN only.    5. DVT Prophylaxis: mechanical  6. GI Prophylaxis: PPI given DAPT + age  7. Code: full, confirmed on admission  8. Disposition: goal for home.   9. ELOS: 3 weeks  10. Follow up Appointments on Discharge: PCP, stroke and general neurology      I, Bozena Bwoer, spent a total of 35 minutes face-to-face or managing the care of Jose Mallory. Over 50% of my time on the unit was spent counseling the patient and coordinating care. See note for details.        Patient seen and discussed with Dr. Fredis Ordonez, PM&R staff physician       Bozena Bower, PA-C  Physical Medicine & Rehabilitation

## 2021-11-17 NOTE — PROGRESS NOTES
CLINICAL NUTRITION SERVICES - REASSESSMENT NOTE     Nutrition Prescription    RECOMMENDATIONS FOR MDs/PROVIDERS TO ORDER:  None today - pt reviewed face to face during team rounds     Malnutrition Status:    Patient does not meet two of the established criteria necessary for diagnosing malnutrition but is at risk for malnutrition    Recommendations already ordered by Registered Dietitian (RD):  None - continue PRN supplement order - pt encouraged to consume stock in room, RD also requested nursing encourage pt to take supplements in room    Future/Additional Recommendations:  Continue to monitor meal/supplement intakes, weight and lab trends      EVALUATION OF THE PROGRESS TOWARD GOALS   Diet: Regular  Supplement: Ensure Enlive PRN - pt has excess stock in room  Intake: % per flow sheets        NEW FINDINGS   MAR reviewed. Labs reviewed. Pt reviewed during care team rounds and visited at bedside, intakes acutely declined due to pt having and being treated with pneumonia, pt reports feeling improved today, RD again reviewed the importance of adequate intakes, RD encouraged pt to consume excess stock of supplements in room, MD encouraged pt take yogurt now that pt is on IV antibiotics, may cause altered GI function, encouraged pt to speak up if GI issues arise, pt agreeing.     11/15/21 0646 85 kg (187 lb 8 oz)   11/14/21 1500 85.5 kg (188 lb 8 oz)   11/10/21 1100 86 kg (189 lb 11.2 oz)   11/09/21 1517 86.4 kg (190 lb 8 oz)   11/03/21 0654 84.1 kg (185 lb 6.4 oz)   11/02/21 0930 84 kg (185 lb 3.2 oz)   11/01/21 0415 83.9 kg (184 lb 14.4 oz)   10/29/21 1600 88.3 kg (194 lb 9.6 oz)      89.8 kg (198 lb) 09/23/2021 - per CE      90.2 kg (198 lb 11.9 oz) 04/27/2021 - per CE     Weight assessment: 2 lb wt gain from >1 week ago, non-significant 4% weight loss in >1 month and >6 months.    MALNUTRITION  % Intake: Decreased intake does not meet criteria  % Weight Loss: Weight loss does not meet criteria  Subcutaneous  Fat Loss: Facial region: mild vs age related   Muscle Loss: Posterior calf: mild vs age related   Fluid Accumulation/Edema: None noted  Malnutrition Diagnosis: Patient does not meet two of the established criteria necessary for diagnosing malnutrition but is at risk for malnutrition    Previous Goals   Patient to consume % of nutritionally adequate meal trays TID, or the equivalent with supplements/snacks  Evaluation: Not met    Previous Nutrition Diagnosis  Predicted inadequate nutrient intake related to decreased, but improved appetite as evidenced by pt reports and weight trends     Evaluation: No change/diagnosis slightly adjusted to below     CURRENT NUTRITION DIAGNOSIS  Predicted inadequate nutrient intake related to varying appetite as evidenced by meal intakes of % per flow sheets     INTERVENTIONS  Implementation  Medical food supplement therapy - continue PRN order, pt encouraged to consume stock in room    Goals  Patient to consume % of nutritionally adequate meal trays TID, or the equivalent with supplements/snacks.    Monitoring/Evaluation  Progress toward goals will be monitored and evaluated per protocol.    Ynes Emery RD, CNSC, LD  [facility] pager: [phone]

## 2021-11-17 NOTE — PROGRESS NOTES
11/17/21 1345   Signing Clinician's Name / Credentials   Signing clinician's name / credentials Anne-Marie Siddiqui, PT   Pham Balance Scale (PHAM K, GIL S, IRIS SALINAS, MACEY JORGENSEN: MEASURING BALANCE IN THE ELDERLY: VALIDATION OF AN INSTRUMENT. CAN. J. PUB. HEALTH, JULY/AUGUST SUPPLEMENT 2:S7-11, 1992.)   Sit To Stand 2   Standing Unsupported 4   Sitting Unsupported 4   Stand to Sit 1   Transfers 1   Standing with Eyes Closed 4   Standing Unsupported, Feet Together 1   Reach Forward With Outstretched Arm 2   Retrieve Object From Floor 0   Turning to Look Behind 1   Turn 360 Degrees 2   Placing Alternate Foot on Stool (4-6 inches) 2   Unsupported Tandem Stand (Demonstrate to Subject) 2   One Leg Stand 1   Total Score (A score of 45 or less has been correlated with an increased risk of falls)   Total Score (out of 56) 27   Pham Balance Scale (BBS) Cutoff Scores for CVA Population:  Patient Score: 27/56    The BBS is a measure of static and dynamic standing balance that has been validated in community dwelling elderly individuals and individuals who have Parkinson's Disease, MS, and those who are s/p CVA and TBI. The test is administered without an assistive device. Scores from the Pham are used to determine the probability of falling based on the patient's previous history of falls and their test performance.     0-20 High risk for falling- Corresponded with w/c bound status  21-40 Medium risk for falling- Able to walk with assistance  41-56 Low risk for falling- Able to walk independently  According to [facility].  Available at http://www.strokecenter.org/.  Accessibility verified April 10, 2013.  Minimal Detectable Change = 6.5 according to Jose A & Seney 2008    Assessment (rationale for performing, application to patient s function & care plan): Significant change since last week, attributed to acute illness, deconditioning of 2.5 days in bed.   Minutes billed as physical performance test:  15

## 2021-11-17 NOTE — CARE CONFERENCE
Acute Rehab Care Conference/Team Rounds      Type: Team Rounds    Present: Dr Fredis Ordonez, Bozena Bower PA, Bhavna Jim RN, nAne-Marie Siddiqui PT, Iris Marr OT, Mabel Mckoy [facility], Ynes Emery Dietician, Patient Jose Mallory      Discharge Barriers/Treatment/Education    Rehab Diagnosis: Stroke Ischemic 01.2 (R) Body Involvement (L) Brain: L pontine stroke    Active Medical Co-morbidities/Prognosis:   Patient Active Problem List   Diagnosis     Gait difficulty     Weakness     Left pontine stroke (H)        Safety: A1 with walker and gait belt, used call light appropriately    Pain: Denies pain    Medications, Skin, Tubes/Lines: Takes pills whole, R hand PIV, patent    Swallowing/Nutrition:    Bowel/Bladder: Continent of Bowel and Bladder, used urinal at night, ambulates to bathroom for toilet needs.     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 All basic needs met on main level. No MH, SA or financial concerns reported. Support from wife and adult children, DIL is a PT.     ADLs/IADLs: :Oral cares mod I sitting at sink  G/H:mod I sitting at sink  U/b dressing:  button down shirt sba /mod I if shirt is in reach  L/B dressing: underwear/ shorts sba/mod I  Feet:sba /mod I sitting  socks and shoes      Mobility: Up in room w/ walker and assist.  Has missed two days of therapy due to being ill. Anticipate progressive participation as pneumonia resolves. Recommend ongoing IP care at this time.        Cognition/Language:    Community Re-Entry:  Ambulatory w/ walker    Transportation: not a  due to LE weakness and poor reaction time.    Decision maker: self    Plan of Care and goals reviewed and updated.    Discharge Plan/Recommendations    Fall Precautions: continue    Patient/Family input to goals: Yes    Anticipated rehab needs following discharge: Home with family    Anticipated care giver support after discharge: Family    Estimated length of stay:  11/24/21    Overall plan for the patient: Continue IP Rehabilitation.       Utilization Review and Continued Stay Justification    Medical Necessity Criteria:    For any criteria that is not met, please document reason and plan for discharge, transfer, or modification of plan of care to address.    Requires intensive rehabilitation program to treat functional deficits?: Yes    Requires 3x per week or greater involvement of rehabilitation physician to oversee rehabilitation program?: Yes    Requires rehabilitation nursing interventions?: Yes    Patient is making functional progress?: Yes    There is a potential for additional functional progress? Yes    Patient is participating in therapy 3 hours per day a minimum of 5 days per week or 15 hours per week in 7 day period?: Medical exception days 18 and 19, no therapy these days with new medical concerns per daily notes, otherwise has been tolerating well    Has discharge needs that require coordinated discharge planning approach?:Yes        Final Physician Sign off    Statement of Approval: I approve the plan of care.     Patient Goals  Social Work Goals: Confirm discharge recommendations with therapy, coordinate safe discharge plan and remain available to support and assist as needed.       OT Frequency: ot increased stay to 21 days to 11-  OT goal: hygiene/grooming: modified independent  OT goal: upper body dressing: Modified independent  OT goal: lower body dressing: Modified independent  OT goal: upper body bathing: Modified independent  OT goal: lower body bathing: Modified independent  OT goal: bed mobility: Modified independent  OT Goal: transfer: Modified independent  OT goal: toilet transfer/toileting: Modified independent  OT goal: meal preparation: Supervision/stand-by assist  OT goal: home management: Supervision/stand-by assist           OT goal 1: pt will be mod I hep for b u/e  OT goal 2: pt will be sba tub/shower transfer                PT  Frequency: daily x 90 minutes  PT goal: bed mobility:  (met)  PT goal: transfers: Modified independent  PT goal: gait: Modified independent,Greater than 200 feet,Rolling walker  PT goal: stairs: 3 stairs,Modified independent,Rail on both sides  PT goal: perform aerobic activity with stable cardiovascular response: continuous activity,20 minutes,NuStep     PT goal 1: Pt will demonstrate Ind car transfer for safe community mobility.  PT Goal 2: Pt will be able to complete full flight of stairs with 1 railing at mod I level for access to basement  PT Goal 3: Pt will demonstrate abiltiy to performed HEP safely for continued progress between therapies.  PT Goal 4: fall recovery w/ furniture assist                                                                   Nursing Goal: Medical Management: Patient will attend Stroke [facility] prior to discharge.  Nursing Goal: Medication Management: Pt will pass MAP before discharge.  Nursing Goal: Safety Management: Pt will use call light and wait for staff to arrive prior to all transfers to remain free of falls by 11/19/2021

## 2021-11-18 ENCOUNTER — APPOINTMENT (OUTPATIENT)
Dept: PHYSICAL THERAPY | Facility: CLINIC | Age: 76
DRG: 056 | End: 2021-11-18
Attending: PHYSICAL MEDICINE & REHABILITATION
Payer: MEDICARE

## 2021-11-18 ENCOUNTER — APPOINTMENT (OUTPATIENT)
Dept: OCCUPATIONAL THERAPY | Facility: CLINIC | Age: 76
DRG: 056 | End: 2021-11-18
Attending: PHYSICAL MEDICINE & REHABILITATION
Payer: MEDICARE

## 2021-11-18 LAB
ANION GAP SERPL CALCULATED.3IONS-SCNC: 7 MMOL/L (ref 3–14)
BACTERIA SPT CULT: NORMAL
BUN SERPL-MCNC: 12 MG/DL (ref 7–30)
CALCIUM SERPL-MCNC: 8.2 MG/DL (ref 8.5–10.1)
CHLORIDE BLD-SCNC: 110 MMOL/L (ref 94–109)
CO2 SERPL-SCNC: 23 MMOL/L (ref 20–32)
CREAT SERPL-MCNC: 0.94 MG/DL (ref 0.66–1.25)
CRP SERPL-MCNC: 51 MG/L (ref 0–8)
ERYTHROCYTE [DISTWIDTH] IN BLOOD BY AUTOMATED COUNT: 11.9 % (ref 10–15)
GFR SERPL CREATININE-BSD FRML MDRD: 77 ML/MIN/1.73M2
GLUCOSE BLD-MCNC: 125 MG/DL (ref 70–99)
GLUCOSE BLDC GLUCOMTR-MCNC: 111 MG/DL (ref 70–99)
GLUCOSE BLDC GLUCOMTR-MCNC: 127 MG/DL (ref 70–99)
GLUCOSE BLDC GLUCOMTR-MCNC: 151 MG/DL (ref 70–99)
GLUCOSE BLDC GLUCOMTR-MCNC: 175 MG/DL (ref 70–99)
GRAM STAIN RESULT: NORMAL
HCT VFR BLD AUTO: 37.9 % (ref 40–53)
HGB BLD-MCNC: 13 G/DL (ref 13.3–17.7)
MCH RBC QN AUTO: 30.9 PG (ref 26.5–33)
MCHC RBC AUTO-ENTMCNC: 34.3 G/DL (ref 31.5–36.5)
MCV RBC AUTO: 90 FL (ref 78–100)
PLATELET # BLD AUTO: 132 10E3/UL (ref 150–450)
POTASSIUM BLD-SCNC: 3.7 MMOL/L (ref 3.4–5.3)
RBC # BLD AUTO: 4.21 10E6/UL (ref 4.4–5.9)
SODIUM SERPL-SCNC: 140 MMOL/L (ref 133–144)
WBC # BLD AUTO: 7.6 10E3/UL (ref 4–11)

## 2021-11-18 PROCEDURE — 250N000011 HC RX IP 250 OP 636: Performed by: PHYSICIAN ASSISTANT

## 2021-11-18 PROCEDURE — 250N000013 HC RX MED GY IP 250 OP 250 PS 637: Performed by: PHYSICIAN ASSISTANT

## 2021-11-18 PROCEDURE — 97110 THERAPEUTIC EXERCISES: CPT | Mod: GO | Performed by: OCCUPATIONAL THERAPIST

## 2021-11-18 PROCEDURE — 97535 SELF CARE MNGMENT TRAINING: CPT | Mod: GO | Performed by: OCCUPATIONAL THERAPIST

## 2021-11-18 PROCEDURE — 97530 THERAPEUTIC ACTIVITIES: CPT | Mod: GP | Performed by: PHYSICAL THERAPIST

## 2021-11-18 PROCEDURE — 258N000003 HC RX IP 258 OP 636

## 2021-11-18 PROCEDURE — 128N000003 HC R&B REHAB

## 2021-11-18 PROCEDURE — 86140 C-REACTIVE PROTEIN: CPT | Performed by: PHYSICIAN ASSISTANT

## 2021-11-18 PROCEDURE — 999N000007 HC SITE CHECK

## 2021-11-18 PROCEDURE — 99233 SBSQ HOSP IP/OBS HIGH 50: CPT | Performed by: PHYSICAL MEDICINE & REHABILITATION

## 2021-11-18 PROCEDURE — 36415 COLL VENOUS BLD VENIPUNCTURE: CPT | Performed by: PHYSICIAN ASSISTANT

## 2021-11-18 PROCEDURE — 97116 GAIT TRAINING THERAPY: CPT | Mod: GP | Performed by: PHYSICAL THERAPIST

## 2021-11-18 PROCEDURE — 250N000013 HC RX MED GY IP 250 OP 250 PS 637: Performed by: PHYSICAL MEDICINE & REHABILITATION

## 2021-11-18 PROCEDURE — 80048 BASIC METABOLIC PNL TOTAL CA: CPT | Performed by: PHYSICIAN ASSISTANT

## 2021-11-18 PROCEDURE — 97535 SELF CARE MNGMENT TRAINING: CPT | Mod: GO

## 2021-11-18 PROCEDURE — 97110 THERAPEUTIC EXERCISES: CPT | Mod: GP | Performed by: PHYSICAL THERAPIST

## 2021-11-18 PROCEDURE — 85027 COMPLETE CBC AUTOMATED: CPT | Performed by: PHYSICIAN ASSISTANT

## 2021-11-18 RX ORDER — SODIUM CHLORIDE 9 MG/ML
INJECTION, SOLUTION INTRAVENOUS
Status: COMPLETED
Start: 2021-11-18 | End: 2021-11-18

## 2021-11-18 RX ORDER — BENZONATATE 100 MG/1
100 CAPSULE ORAL 3 TIMES DAILY
Status: DISCONTINUED | OUTPATIENT
Start: 2021-11-18 | End: 2021-11-23

## 2021-11-18 RX ADMIN — ACETAMINOPHEN 650 MG: 325 TABLET, FILM COATED ORAL at 09:28

## 2021-11-18 RX ADMIN — BENZONATATE 100 MG: 100 CAPSULE ORAL at 21:29

## 2021-11-18 RX ADMIN — METFORMIN HYDROCHLORIDE 1000 MG: 500 TABLET ORAL at 09:29

## 2021-11-18 RX ADMIN — CLOPIDOGREL BISULFATE 75 MG: 75 TABLET, FILM COATED ORAL at 09:30

## 2021-11-18 RX ADMIN — ASPIRIN 325 MG ORAL TABLET 325 MG: 325 PILL ORAL at 09:29

## 2021-11-18 RX ADMIN — PANTOPRAZOLE SODIUM 40 MG: 40 TABLET, DELAYED RELEASE ORAL at 09:30

## 2021-11-18 RX ADMIN — SODIUM CHLORIDE: 9 INJECTION, SOLUTION INTRAVENOUS at 11:47

## 2021-11-18 RX ADMIN — INSULIN ASPART 1 UNITS: 100 INJECTION, SOLUTION INTRAVENOUS; SUBCUTANEOUS at 12:01

## 2021-11-18 RX ADMIN — Medication 1 CAPSULE: at 09:29

## 2021-11-18 RX ADMIN — SENNOSIDES AND DOCUSATE SODIUM 1 TABLET: 8.6; 5 TABLET ORAL at 09:30

## 2021-11-18 RX ADMIN — BENZONATATE 100 MG: 100 CAPSULE ORAL at 14:05

## 2021-11-18 RX ADMIN — Medication 1 CAPSULE: at 21:30

## 2021-11-18 RX ADMIN — PIPERACILLIN AND TAZOBACTAM 4.5 G: 4; .5 INJECTION, POWDER, FOR SOLUTION INTRAVENOUS at 21:29

## 2021-11-18 RX ADMIN — ENALAPRIL MALEATE 20 MG: 5 TABLET ORAL at 09:29

## 2021-11-18 RX ADMIN — METFORMIN HYDROCHLORIDE 1000 MG: 500 TABLET ORAL at 18:48

## 2021-11-18 RX ADMIN — ACETAMINOPHEN 650 MG: 325 TABLET, FILM COATED ORAL at 18:47

## 2021-11-18 RX ADMIN — BENZONATATE 100 MG: 100 CAPSULE ORAL at 09:29

## 2021-11-18 RX ADMIN — PIPERACILLIN AND TAZOBACTAM 4.5 G: 4; .5 INJECTION, POWDER, FOR SOLUTION INTRAVENOUS at 14:05

## 2021-11-18 RX ADMIN — MIRTAZAPINE 15 MG: 7.5 TABLET, FILM COATED ORAL at 21:29

## 2021-11-18 RX ADMIN — PIPERACILLIN AND TAZOBACTAM 4.5 G: 4; .5 INJECTION, POWDER, FOR SOLUTION INTRAVENOUS at 02:30

## 2021-11-18 RX ADMIN — ATORVASTATIN CALCIUM 40 MG: 40 TABLET, FILM COATED ORAL at 21:29

## 2021-11-18 RX ADMIN — Medication 5 MG: at 21:29

## 2021-11-18 RX ADMIN — PIPERACILLIN AND TAZOBACTAM 4.5 G: 4; .5 INJECTION, POWDER, FOR SOLUTION INTRAVENOUS at 09:23

## 2021-11-18 RX ADMIN — SENNOSIDES AND DOCUSATE SODIUM 1 TABLET: 8.6; 5 TABLET ORAL at 21:30

## 2021-11-18 ASSESSMENT — ACTIVITIES OF DAILY LIVING (ADL)
ADLS_ACUITY_SCORE: 13
ADLS_ACUITY_SCORE: 12
ADLS_ACUITY_SCORE: 12
ADLS_ACUITY_SCORE: 13
ADLS_ACUITY_SCORE: 12
ADLS_ACUITY_SCORE: 13
ADLS_ACUITY_SCORE: 12
ADLS_ACUITY_SCORE: 13
ADLS_ACUITY_SCORE: 12
ADLS_ACUITY_SCORE: 13
ADLS_ACUITY_SCORE: 13
ADLS_ACUITY_SCORE: 12
ADLS_ACUITY_SCORE: 13

## 2021-11-18 NOTE — PLAN OF CARE
"Discharge Planner Post-Acute Rehab PT:      Discharge Plan: Home with OP PT with discharge date anticipated 11/24     Precautions: Fall precaution     Current Status:  Bed Mobility: Ind  Transfer: CGA without device to complete stand pivot transfer  Gait: AMb >250 with 4ww and SBA. Intermittent freezing/festinating.  Stairs: 8 6\" steps with B railings and reciprocal pattern needing increased UE support for L step;  limited due to dizziness  Balance:   PASS:  22/36  Haas (11/17): 27/56  Gait Speed: .48 m/s,  this is a pathological gait speed     Assessment: Transitioned from U step to 4ww today w/o significant change in gait pattern or speed.  Will use this for duration of IP stay. Talked w/ him regarding need for assist at home, especially since his wife has also been in the hospital.  This was a difficult topic for him.  Followed up w/ SW for a longer conversation tomorrow. Anticipate he will need at least daily physical check in's along w/ IADL assist and bathing.   Other Barriers to Discharge (DME, Family Training, etc):   Mobility: 4ww, has from previous  "

## 2021-11-18 NOTE — PLAN OF CARE
Discharge Planner Post-Acute Rehab OT:      Discharge Plan: home with wife; however, wife in hospital currently, as well     Precautions: falls     Current Status:  ADLs:   G/H - standing/sitting EOS with Uwalker/4WW SBA with set up, CGA standing d/t fatigue  U/b - seated SBA with set up, A to fasten buttons  L/B dressing - seated EOB with set up SBA and stand don over hips CGA and extra time d/t fatigue  Feet: seated w/support of chair, doff cross leg tech SBA, don dependent d/t fatigue  Shower: sba min cues for sequencing slower processing of shower task pt able to wash all areas  inclusing crossing leg over other to gt lowerr leg and feet and standing with cga  with rail for pericares from front and back  Tub/shower transfer: CGA     IADLs: TBA     Vision/Cognition: slums increased to 27/30  Will continue functional cog tasks     Assessment: Pt requiring extra time and A in AM ADL routine d/t c/o fatigue and coughing impacting activity tolerance and confidence in balance. Pt deconditioned since bedrest d/t pneumonia as evidenced by decreased level of independence in familiar routine. Pt will benefit from continued skilled OT services to return to level of function required for safe discharge, especially as pt wife currently hospitalized and anticipate will have caregiving limitations.    Other Barriers to Discharge (DME, Family Training, etc): daughter in law PT pt has rolling walker and higher toilets at home. Tub bench or small shower stool depending on which bathroom pt decides to use.

## 2021-11-18 NOTE — PLAN OF CARE
"  VS: BP 98/55 (BP Location: Left arm)   Pulse 98   Temp 97.5  F (36.4  C) (Oral)   Resp 16   Ht 1.778 m (5' 10\")   Wt 85 kg (187 lb 8 oz)   SpO2 96%   BMI 26.90 kg/m     O2: Pt on room air, denies SoB   Output: Continent, urinal at bedside   Last BM: 11/17/21   Activity: Transfers ax1 w/ walker and gait belt.    Skin: Blanchable redness on coccyx.    Pain: Denied   CMS: Alert and oriented x4. Able to make needs known.    Dressing: PIV dressing (R) forearm - CDI   Diet: Regular diet.    LDA: PIV (R) forearm - infusing NS @ 100mL/hr.   Equipment: Call light within reach, walker.    Plan: Continue with POC   Additional Info:        "

## 2021-11-18 NOTE — PLAN OF CARE
FOCUS/GOAL  Medical management    ASSESSMENT, INTERVENTIONS AND CONTINUING PLAN FOR GOAL:  Pt A&Ox4, A1 with walker. Pt continent of B&B, used urinal, LBM 11/16, pt declined bowel medications. Pt ate 25% of lunch meal, appetite was poor. Pt has PIV in R arm, patent, q6h with antibiotics. Call light in reach, bed alarms on.

## 2021-11-18 NOTE — PLAN OF CARE
FOCUS/GOAL  Bowel management, Bladder management, Medication management, Pain management, Medical management, Mobility, Skin integrity, and Safety management    ASSESSMENT, INTERVENTIONS AND CONTINUING PLAN FOR GOAL:      Pt is alert and oriented x 4. Continent of bowel and bladder, reported last bm two days ago, scheduled bowel meds given. Ax1 transfers. Reg diet, thin liquids. Receiving IV antibiotics every 6 hours. PIV patent and site CDI. Reported constant back pain, managed with tylenol, heat packs, quiet environment and rest. Denied nausea and vomiting, numbness or tingling, SOB. Alarms on, call light within reach. Will continue with plan of care.

## 2021-11-18 NOTE — PLAN OF CARE
FOCUS/GOAL  Medical management    ASSESSMENT, INTERVENTIONS AND CONTINUING PLAN FOR GOAL:  Patient is alert and oriented, awake when checked at the start of the shift,  denies pain when asked, no episode of N&V, no respiratory distress. Patient is on IV antibiotic every 6 hours, administered as scheduled. Still encouraging patient to increase fluid intake when awake, new pitcher of water given at the start of the shift. Patient is light sleeper, easily awaken with noise, kept the door close.Patient did not sleep well, was interrupted for scheduled IV administration, IV line clogged,took a while to figure out the clogged ,noted clogged cap, replaced a new one, patient not happy and upset., call light in reach, may continue to monitor.

## 2021-11-18 NOTE — PROGRESS NOTES
[facility]   Acute Rehabilitation Unit  Daily progress note    INTERVAL HISTORY  Jose was seen and examined at bedside this morning.  No acute events reported overnight, although IV line became clogged and it took some time to fix, which significantly disrupted patient's sleep.  He states he is feeling quite fatigued this morning.  He also notes soreness in left side of rib cage, which occurs only when coughing or taking a deep breath, and he attributes to frequent coughing.  He has not been utilizing any PRN cough medication or Tylenol.  He reports sore throat has continued to improve, headache is resolved.  He denies any fever/chills, shortness of breath.    Functionally, he is performing stand pivot transfers with contact guard assist, ambulating up to 250' with U step walker and standby assist.  Repeat PHAM yesterday after 2.5 days bedrest at 27.56, decreased from 37/56 prior.  OT noting decreased balance and activity tolerance this morning 2/2 fatigue and soreness from coughin.  He performed grooming/hygiene seated with intermittent CGA, upper body dressing with supervision and set-up assist, lower body dressing with SBA.    MEDICATIONS    aspirin  325 mg Oral Daily     atorvastatin  40 mg Oral QPM     benzonatate  100 mg Oral TID     clopidogrel  75 mg Oral Daily     enalapril  20 mg Oral Daily     influenza vac high-dose quad  0.7 mL Intramuscular Prior to discharge     insulin aspart  1-7 Units Subcutaneous TID AC     insulin aspart  1-5 Units Subcutaneous At Bedtime     lactobacillus rhamnosus (GG)  1 capsule Oral BID     metFORMIN  1,000 mg Oral BID w/meals     mirtazapine  15 mg Oral At Bedtime     pantoprazole  40 mg Oral QAM AC     piperacillin-tazobactam  4.5 g Intravenous Q6H     polyethylene glycol  17 g Oral Daily     senna-docusate  1 tablet Oral BID     sodium chloride (PF)  3 mL Intracatheter Q8H     sodium chloride            acetaminophen, sore throat  "lozenge, bisacodyl, glucose **OR** dextrose **OR** glucagon, melatonin, oxymetazoline, phenol, senna-docusate, sodium chloride (PF)     PHYSICAL EXAM  BP (!) 179/90 (BP Location: Left arm)   Pulse 79   Temp 97.8  F (36.6  C) (Oral)   Resp 16   Ht 1.778 m (5' 10\")   Wt 85 kg (187 lb 8 oz)   SpO2 96%   BMI 26.90 kg/m     Gen: NAD  HEENT: NC/AT  Cardio: RRR, +ectopic beats, no murmurs  Pulm: non-labored on room air, crackles in L lower lung field  Abd: soft, non-tender, non-distended, bowel sounds present  Ext: no edema in bilateral lower extremities  Neuro/MSK: AAOx4, follows commands, mild facial droop on L, no tenderness to palpation over anterior/lateral ribcage    LABS  CBC RESULTS:   Recent Labs   Lab Test 11/18/21  0817 11/17/21  0645 11/16/21  0603   WBC 7.6 7.6 10.9   RBC 4.21* 4.35* 4.34*   HGB 13.0* 13.3 13.2*   HCT 37.9* 39.4* 39.2*   MCV 90 91 90   MCH 30.9 30.6 30.4   MCHC 34.3 33.8 33.7   RDW 11.9 11.9 11.9   * 123* 129*     Last Basic Metabolic Panel:  Recent Labs   Lab Test 11/18/21  0817 11/18/21  0748 11/17/21  2118 11/17/21  1212 11/17/21  0645 11/16/21  0904 11/16/21  0603     --   --   --  142  --  139   POTASSIUM 3.7  --   --   --  4.2  --  4.3   CHLORIDE 110*  --   --   --  112*  --  110*   CO2 23  --   --   --  29  --  26   ANIONGAP 7  --   --   --  1*  --  3   * 127* 118*   < > 123*   < > 148*   BUN 12  --   --   --  11  --  11   CR 0.94  --   --   --  1.11  --  1.19   GFRESTIMATED 77  --   --   --  63  --  58*   LEON 8.2*  --   --   --  8.6  --  8.7    < > = values in this interval not displayed.       Rehabilitation - continue comprehensive acute inpatient rehabilitation program with multidisciplinary approach including therapies, rehab nursing, and physiatry following. See interval history for updates.      ASSESSMENT AND PLAN  Jose Mallory is a 76 year old right hand dominant male with a past medical history of DM2, HTN, HLD, GERD, posterior fossa arachnoid " cyst, peripheral neuropathy, prior R pontine stroke, gait instability, and bilateral knee replacement who was admitted on 10/26/21 with progressive lower extremity weakness and gait difficulties, found to have subacute left pontine stroke with course complicated by neuropathy.  He is now admitted to ARU on 10/29 for multidisciplinary rehabilitation and ongoing medical management.       #Subacute L pontine stroke  #Subacute to chronic R pontine stroke  #Gait instability  #Arachnoid cyst  #Moderate cervical canal stenosis with disc herniation most pronounced at C3-C4, C4-C5   MRI with subacute cerebral infarction in left ventral cookie.  TTE EF 60-65%, no significant valvular disease or cardiac source for embolus.  Telemetry with NSR and no evidence of atrial fibrillation.  IV tPA was not initiated due to unclear or unfavorable risk-benefit profile for extended window thrombolysis beyond the conventional 4.5 hour time window.  Etiology of stroke thought to be atherosclerotic. Gait instability suspected to be multifactorial including peripheral neuropathy, cerebellar disease, and recent bilateral pontine strokes.  - Continue DAPT with Aspirin 325 mg daily and Plavix 75 mg daily x90 days, then stop Plavix and continue Aspirin 325 mg daily only  - High intensity statin (atorvastatin 40 mg daily) to target LDL <70  - HbA1c at goal (6.3%)  - Long-term BP goal to 120/80  - PT noting festinating gait, very slow gait, intermittent freezing, cogwheeling, difficulty initiating motor tasks.  Plan to follow-up with neurology  - Continue PT/OT  - Outpatient sleep evaluation for SELIN  - Follow up with neurology in 4-6 weeks  - Per hospital discharge summary, if gait has not improved after the pt's stay at acute rehab and/or by the time there is neurology follow up in the outpatient setting, please consider additional neurosurgery consultation.     #Peripheral neuropathy  #Decreased vibration sensation bilaterally in lower  extremities  Per neurology, suspect that large fiber sensory dysfunction secondary to diabetes.  Vitamin B12/folate WNL, NEHA negative, HIV non reactive, RPR negative.  Copper, zinc WNL.  Protein electrophoresis with slightly elevated alpha 2, per pathologist, essentially normal, no obvious monoclonal proteins.  Homocysteine WNL, MMA WNL.  - Follow up with neurology for EMG     #HTN  [PTA enalapril 5mg PO daily]  Enalapril increased to 10 mg daily on 11/2 due to generally elevated BP, increased again to 20 mg daily on 11/13.  - BP improved although still intermittently elevated  - Continue enalapril 20 mg daily  - Continue to monitor BP, adjust meds as indicated     #DMII  [PTA meds: metformin 1000 mg BID, glipizide 10 mg daily]  A1c 6.3%.  Patient reports compliance with medications, but that he was not taking his blood glucose at home as directed.    - Monitor blood glucose TID AC and HS.  BG currently 118-171.  - Continue PTA metformin 1g BID  - PTA glipizide 10 mg held throughout inpatient admission.  BG slightly elevated but also with variable intake.  Resumed at low dose 2.5 mg daily 11/2 but with mild hypoglycemia (66), discontinued until intake improved.  - Hypoglycemia protocol  - Will need new glucometer and supplies, diabetes educator consulted    Cr uptrending  Poor intake  0.86 -> 1.03 -> 1.13 on 11/3.  Poor oral fluid intake, also recently increased dose of enalapril. Patient agreeable to increasing oral fluids.  Improved.  However, recently decreased in the setting of impaired intake and not feeling well.  S/p 1L IV fluids 11/15 given tachycardia, poor intake, repeat on 11/16 and 11/17 due to ongoing tachycardia, Cr slightly elevated.  - BMP today with Cr improved to 0.94.  HR improved to 70s.  Reports improving oral intake  - Encourage fluids    Constipation  No BM for several days on admission, then with episode of emesis 10/31 and loose stools x2, now with recurrent constipation.   Receiving Miralax  "and Senokot-S.  Refused suppository 11/3.  Added prune juice.  Has refused suppository x2 days.  Feeling distended/full.  No recurrent nausea/vomiting.  Bowel sounds present, passing gas.  AXR on 11/5 with no significant colonic stool burden, non-obstructive bowel gas pattern.  Did have small BM on 11/5 and has been more regular since, also frequently going several days without.  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  He notes decreased interest in food over the past year or so.  He has been sleeping poorly this admission.  Struggling with being away from family as well.  - Psych consult completed 11/5, appreciate assistance  - Remeron 7.5 mg started on 11/5 for mood, appetite, sleep.  Patient states that he is sleeping better.  Ongoing issues with depressed mood, but also with significant social stressors due to wife's illness.  Appetite remains impaired.  Increased to 15 mg daily on 11/9.  RD noting weight stabilized, intake improved.    Suspected HAP  Reported onset of symptoms over the weekend with \"very\" sore throat, headache, nasal congestion, mild productive cough.  VSS, temp mildly elevated at 99.5F, mildly tachycardic. Oxygen sats mid 90s on room air.  Labs 11/15: slightly elevated WBC (11.6), CRP mildly elevated at 19 (prior 12), procal 0.09.  COVID (-) on 11/14.  Influenza (-) 11/15.  CXR revealed dense retrocardiac opacities, suspicious for infection.  Started on IV Zosyn and given 1L NS bolus.    - Continue comfort measures with PRN Tylenol, throat lozenge/spray, Afrin nasal spray, Mucinex  - 11/16: feeling slightly better.  Remains afebrile.  O2 sats 93-94% on room air.  Slight tachycardia.  WBC normalized at 10.9, CRP further elevated to 78 (19), " procal stable at 0.07.  - Blood culture NGTD.  Sputum culture in process.  - Discussed with hospitalist 11/16, advised to continue current plan of care, repeat fluids and possible repeat COVID test as below.  Will consider formal consult if clinical worsens or not improving on current course.  - Respiratory viral panel negative  - 11/17: ongoing but improved cough, headache, sore throat.  No dyspnea, loss of taste/smell.  Afebrile.  Remains slightly tachycardic and borderline tachypnic.  Labs with WBC WNL, CRP stable at 74, procal now <0.05.  - Repeat COVID PCR 11/17 negative  - 11/18: ongoing cough, improved sore throat, no other ongoing symptoms.  Does have left-sided pleuritic chest pain, aggravated by cough.  Has not been using PRN meds.  Schedule Tessalon TID, Tylenol PRN.  Continue to monitor.  Remains afebrile, WBC WNL, CRP downtrending (51)      1. Adjustment to disability:  Psych consulted completed, Remeron trial started 11/5, dose increased 11/9, continue to monitor.  2. FEN: regular diet, thin liquids  3. Bowel: continent, constipation as above, continue to monitor  4. Bladder: continent, PVRs at admission x3 <100, ok to monitor PRN only.    5. DVT Prophylaxis: mechanical  6. GI Prophylaxis: PPI given DAPT + age  7. Code: full, confirmed on admission  8. Disposition: goal for home.   9. ELOS: 3 weeks  10. Follow up Appointments on Discharge: PCP, stroke and general neurology         Patient discussed with Dr. Fredis Ordonez, PM&R staff physician       Bozena Bower PA-C  Physical Medicine & Rehabilitation

## 2021-11-19 ENCOUNTER — APPOINTMENT (OUTPATIENT)
Dept: OCCUPATIONAL THERAPY | Facility: CLINIC | Age: 76
DRG: 056 | End: 2021-11-19
Attending: PHYSICAL MEDICINE & REHABILITATION
Payer: MEDICARE

## 2021-11-19 ENCOUNTER — APPOINTMENT (OUTPATIENT)
Dept: PHYSICAL THERAPY | Facility: CLINIC | Age: 76
DRG: 056 | End: 2021-11-19
Attending: PHYSICAL MEDICINE & REHABILITATION
Payer: MEDICARE

## 2021-11-19 LAB
GLUCOSE BLDC GLUCOMTR-MCNC: 127 MG/DL (ref 70–99)
GLUCOSE BLDC GLUCOMTR-MCNC: 134 MG/DL (ref 70–99)
GLUCOSE BLDC GLUCOMTR-MCNC: 76 MG/DL (ref 70–99)

## 2021-11-19 PROCEDURE — 97535 SELF CARE MNGMENT TRAINING: CPT | Mod: GO

## 2021-11-19 PROCEDURE — 97530 THERAPEUTIC ACTIVITIES: CPT | Mod: GO

## 2021-11-19 PROCEDURE — 97116 GAIT TRAINING THERAPY: CPT | Mod: GP | Performed by: PHYSICAL THERAPIST

## 2021-11-19 PROCEDURE — 97530 THERAPEUTIC ACTIVITIES: CPT | Mod: GP

## 2021-11-19 PROCEDURE — 97112 NEUROMUSCULAR REEDUCATION: CPT | Mod: GP | Performed by: PHYSICAL THERAPIST

## 2021-11-19 PROCEDURE — 250N000013 HC RX MED GY IP 250 OP 250 PS 637: Performed by: PHYSICIAN ASSISTANT

## 2021-11-19 PROCEDURE — 97110 THERAPEUTIC EXERCISES: CPT | Mod: GP | Performed by: PHYSICAL THERAPIST

## 2021-11-19 PROCEDURE — 999N000040 HC STATISTIC CONSULT NO CHARGE VASC ACCESS

## 2021-11-19 PROCEDURE — 99233 SBSQ HOSP IP/OBS HIGH 50: CPT | Performed by: PHYSICAL MEDICINE & REHABILITATION

## 2021-11-19 PROCEDURE — 999N000127 HC STATISTIC PERIPHERAL IV START W US GUIDANCE

## 2021-11-19 PROCEDURE — 258N000003 HC RX IP 258 OP 636

## 2021-11-19 PROCEDURE — 999N000007 HC SITE CHECK

## 2021-11-19 PROCEDURE — 128N000003 HC R&B REHAB

## 2021-11-19 PROCEDURE — 250N000011 HC RX IP 250 OP 636: Performed by: PHYSICIAN ASSISTANT

## 2021-11-19 PROCEDURE — 97530 THERAPEUTIC ACTIVITIES: CPT | Mod: GP | Performed by: PHYSICAL THERAPIST

## 2021-11-19 RX ORDER — SODIUM CHLORIDE 9 MG/ML
INJECTION, SOLUTION INTRAVENOUS
Status: COMPLETED
Start: 2021-11-19 | End: 2021-11-19

## 2021-11-19 RX ORDER — ENALAPRIL MALEATE 5 MG/1
15 TABLET ORAL 2 TIMES DAILY
Status: DISCONTINUED | OUTPATIENT
Start: 2021-11-19 | End: 2021-11-23

## 2021-11-19 RX ADMIN — ATORVASTATIN CALCIUM 40 MG: 40 TABLET, FILM COATED ORAL at 21:04

## 2021-11-19 RX ADMIN — PIPERACILLIN AND TAZOBACTAM 4.5 G: 4; .5 INJECTION, POWDER, FOR SOLUTION INTRAVENOUS at 01:34

## 2021-11-19 RX ADMIN — MIRTAZAPINE 15 MG: 7.5 TABLET, FILM COATED ORAL at 21:21

## 2021-11-19 RX ADMIN — SENNOSIDES AND DOCUSATE SODIUM 1 TABLET: 8.6; 5 TABLET ORAL at 21:04

## 2021-11-19 RX ADMIN — PANTOPRAZOLE SODIUM 40 MG: 40 TABLET, DELAYED RELEASE ORAL at 09:52

## 2021-11-19 RX ADMIN — Medication 1 CAPSULE: at 09:52

## 2021-11-19 RX ADMIN — ACETAMINOPHEN 325 MG: 325 TABLET, FILM COATED ORAL at 07:33

## 2021-11-19 RX ADMIN — PIPERACILLIN AND TAZOBACTAM 4.5 G: 4; .5 INJECTION, POWDER, FOR SOLUTION INTRAVENOUS at 11:11

## 2021-11-19 RX ADMIN — METFORMIN HYDROCHLORIDE 1000 MG: 500 TABLET ORAL at 09:53

## 2021-11-19 RX ADMIN — CLOPIDOGREL BISULFATE 75 MG: 75 TABLET, FILM COATED ORAL at 09:53

## 2021-11-19 RX ADMIN — BENZONATATE 100 MG: 100 CAPSULE ORAL at 09:53

## 2021-11-19 RX ADMIN — PIPERACILLIN AND TAZOBACTAM 4.5 G: 4; .5 INJECTION, POWDER, FOR SOLUTION INTRAVENOUS at 21:11

## 2021-11-19 RX ADMIN — Medication 1 CAPSULE: at 21:04

## 2021-11-19 RX ADMIN — ENALAPRIL MALEATE 15 MG: 5 TABLET ORAL at 09:53

## 2021-11-19 RX ADMIN — ENALAPRIL MALEATE 15 MG: 5 TABLET ORAL at 21:05

## 2021-11-19 RX ADMIN — METFORMIN HYDROCHLORIDE 1000 MG: 500 TABLET ORAL at 17:59

## 2021-11-19 RX ADMIN — PIPERACILLIN AND TAZOBACTAM 4.5 G: 4; .5 INJECTION, POWDER, FOR SOLUTION INTRAVENOUS at 14:52

## 2021-11-19 RX ADMIN — BENZONATATE 100 MG: 100 CAPSULE ORAL at 21:04

## 2021-11-19 RX ADMIN — BENZONATATE 100 MG: 100 CAPSULE ORAL at 14:52

## 2021-11-19 RX ADMIN — SODIUM CHLORIDE: 9 INJECTION, SOLUTION INTRAVENOUS at 11:10

## 2021-11-19 RX ADMIN — ASPIRIN 325 MG ORAL TABLET 325 MG: 325 PILL ORAL at 09:52

## 2021-11-19 ASSESSMENT — ACTIVITIES OF DAILY LIVING (ADL)
ADLS_ACUITY_SCORE: 12

## 2021-11-19 NOTE — PLAN OF CARE
Alert and oriented x4. Continent of bowel and bladder. LBM 11/19. Pain in ribs when coughing, otherwise denies pain. Aqua K pad and T pump obtained and pt reported some relief. Extended Dwell vascular line on R arm was removed due to red, slightly swollen, and firm skin in surrounding area. Ice applied to area. Vascular access placed PIV in LUE. Assist of 1 with walker. Regular thin diet. Discharge pending for next week.

## 2021-11-19 NOTE — PROGRESS NOTES
PT informed SWer at end of day yesterday, recommendation for HC. Per Medicare website, no HC agencies in pt area. SWer looked online at larger city near pt home and searched HC agencies on Medicare list again. [facility] only option. SWer called and informed that there is a branch for [facility] that can service pt area. SWer called, spoke with Linda, faxed clinicals and confirmed that they can service pt at discharge. Linda aware of discharge date and MAVIS made plan to fax ppwk and orders on day of discharge.     SWer received a call later this morning from pt son. Pt son stated that pt's wife is looking at moving into ILF/nursing home and wanting to know if that level of care is appropriate for pt or if he needs a higher level of care. SWer discussed plan for pt to discharge home, (and per discussion with PT yesterday) pt anticipated to manage his own ADLs, will need check-in support for IADLs and supervision for bathing. Can discuss this further in rounds on Tues 11/23. SWer discussed that if pt was to move into a senior living facility, ILF with some services would be appropriate. Suggested that if pt and wife move into ILF to make sure that they can get services as needed or easily move into [facility] if needed. Pt son expressed understanding and appreciation. SWer notified pt son about HC set up. Pt son denied additional questions or needs at this time.     SWer met with pt at bedside. Notified pt of call with son, HC set up and confirmation and [place] [employer] with local number for additional support and resources. Pt given contact information for HC agency. Pt denied any needs at this time. SW will continue to follow and remain available if needs arise.     [facility]-- (Linda) PH: [phone], F: [phone]   RN, PT, OT, THOMAS and SW    [facility]   Direct Phone: [phone]  I   Pager: [phone]  I  Fax: [phone]

## 2021-11-19 NOTE — PLAN OF CARE
Discharge Planner Post-Acute Rehab OT:      Discharge Plan: home with wife; however, wife in hospital currently, as well     Precautions: falls     Current Status:  ADLs:   G/H - standing/sitting EOS with Uwalker/4WW SBA with set up, CGA standing d/t fatigue  U/b - seated SBA , A to fasten buttons  L/B dressing - seated in chair doff/ don sba   And sba  Standing to don over hips. Pt used reacher to assist with doff due to rib pain from coughing from pnumonia   Feet: sitting in chair in room, pt able to cross leg over other and doff/don with no effort. Don/doff shoes on floor with long shoe horn  Shower: sba min cues for sequencing slower processing of shower task pt able to wash all areas  inclusing crossing leg over other to gt lowerr leg and feet and standing with cga  with rail for pericares from front and back  Tub/shower transfer: CGA     IADLs: TBA     Vision/Cognition: slums increased to 27/30  Will continue functional cog tasks     Assessment:. Pt increased l/b dressing with no effort with use of reacher for shorts to decrease rib pain and shoe horn  Pt will benefit from continued skilled OT services to return to level of function required for safe discharge, pt will need to be able to complete adls and tranfers I ,with wife in hospital at this time.    Other Barriers to Discharge (DME, Family Training, etc): daughter in law PT pt has rolling walker and higher toilets at home. Tub bench or small shower stool depending on which bathroom pt decides to use.

## 2021-11-19 NOTE — PROGRESS NOTES
"  [facility]   Acute Rehabilitation Unit  Daily progress note    INTERVAL HISTORY  Jose was seen and examined at bedside this morning.  No acute events reported overnight.  Today, patient reports that he slept \"marvellously\" last night.  He reports cough is much improved.  He denies any ongoing sore throat or headache.  He is having ongoing left-sided chest/ribcage pain, which occurs only when coughing or with deep breaths.  He found heat to be helpful.  He states he had an urgent, loose bowel movement this morning, but denies any nausea or abdominal pain.  Nursing noting redness and swelling at PIV site on right forearm, patient denies any pain.  He also denies fever/chills, shortness of breath, chest pain, dizziness.     Functionally, he completes stand pivot transfers with contact guard assist without device.  He transitioned from U step walker to 4WW without significant change in gait pattern or speed per PT.  He requires standby assist to ambulate up to 250'.  He needs standby assist for seated upper body dressing, standby to contact guard to lower body.  SLUMS increased to 27/30.  PT discussed anticipated need for at least daily check in's, IADL assist, bathing at discharge, and will need further discussion given wife also hospitalized.  Today, patient acknowledges some concern about who will be able to provide the assistance that he needs.    MEDICATIONS    aspirin  325 mg Oral Daily     atorvastatin  40 mg Oral QPM     benzonatate  100 mg Oral TID     clopidogrel  75 mg Oral Daily     enalapril  15 mg Oral BID     influenza vac high-dose quad  0.7 mL Intramuscular Prior to discharge     insulin aspart  1-7 Units Subcutaneous TID AC     insulin aspart  1-5 Units Subcutaneous At Bedtime     lactobacillus rhamnosus (GG)  1 capsule Oral BID     metFORMIN  1,000 mg Oral BID w/meals     mirtazapine  15 mg Oral At Bedtime     pantoprazole  40 mg Oral QAM AC     " "piperacillin-tazobactam  4.5 g Intravenous Q6H     polyethylene glycol  17 g Oral Daily     senna-docusate  1 tablet Oral BID     sodium chloride (PF)  3 mL Intracatheter Q8H        acetaminophen, sore throat lozenge, bisacodyl, glucose **OR** dextrose **OR** glucagon, melatonin, oxymetazoline, phenol, senna-docusate, sodium chloride (PF)     PHYSICAL EXAM  BP (!) 172/88 (BP Location: Left arm)   Pulse 93   Temp 96.8  F (36  C) (Oral)   Resp 20   Ht 1.778 m (5' 10\")   Wt 85 kg (187 lb 8 oz)   SpO2 99%   BMI 26.90 kg/m     Gen: NAD  HEENT: NC/AT  Cardio: RRR, +ectopic beats, no murmurs  Pulm: non-labored on room air, crackles in L lower lung field  Abd: soft, non-tender, non-distended, bowel sounds present  Ext: no edema in bilateral lower extremities  Neuro/MSK: AAOx4, follows commands, mild facial droop on L, tender to palpation over left posterolateral ribcage  Skin: redness and induration at right forearm proximally to PIV insertion site, slightly warm to touch, non-tender, pictured below       LABS  CBC RESULTS:   Recent Labs   Lab Test 11/18/21  0817 11/17/21  0645 11/16/21  0603   WBC 7.6 7.6 10.9   RBC 4.21* 4.35* 4.34*   HGB 13.0* 13.3 13.2*   HCT 37.9* 39.4* 39.2*   MCV 90 91 90   MCH 30.9 30.6 30.4   MCHC 34.3 33.8 33.7   RDW 11.9 11.9 11.9   * 123* 129*     Last Basic Metabolic Panel:  Recent Labs   Lab Test 11/18/21  2114 11/18/21  1730 11/18/21  1117 11/18/21  0817 11/17/21  1212 11/17/21  0645 11/16/21  0904 11/16/21  0603   NA  --   --   --  140  --  142  --  139   POTASSIUM  --   --   --  3.7  --  4.2  --  4.3   CHLORIDE  --   --   --  110*  --  112*  --  110*   CO2  --   --   --  23  --  29  --  26   ANIONGAP  --   --   --  7  --  1*  --  3   * 111* 151* 125*   < > 123*   < > 148*   BUN  --   --   --  12  --  11  --  11   CR  --   --   --  0.94  --  1.11  --  1.19   GFRESTIMATED  --   --   --  77  --  63  --  58*   LEON  --   --   --  8.2*  --  8.6  --  8.7    < > = values in " this interval not displayed.       Rehabilitation - continue comprehensive acute inpatient rehabilitation program with multidisciplinary approach including therapies, rehab nursing, and physiatry following. See interval history for updates.      ASSESSMENT AND PLAN  Jose Mallory is a 76 year old right hand dominant male with a past medical history of DM2, HTN, HLD, GERD, posterior fossa arachnoid cyst, peripheral neuropathy, prior R pontine stroke, gait instability, and bilateral knee replacement who was admitted on 10/26/21 with progressive lower extremity weakness and gait difficulties, found to have subacute left pontine stroke with course complicated by neuropathy.  He is now admitted to ARU on 10/29 for multidisciplinary rehabilitation and ongoing medical management.       #Subacute L pontine stroke  #Subacute to chronic R pontine stroke  #Gait instability  #Arachnoid cyst  #Moderate cervical canal stenosis with disc herniation most pronounced at C3-C4, C4-C5   MRI with subacute cerebral infarction in left ventral cookie.  TTE EF 60-65%, no significant valvular disease or cardiac source for embolus.  Telemetry with NSR and no evidence of atrial fibrillation.  IV tPA was not initiated due to unclear or unfavorable risk-benefit profile for extended window thrombolysis beyond the conventional 4.5 hour time window.  Etiology of stroke thought to be atherosclerotic. Gait instability suspected to be multifactorial including peripheral neuropathy, cerebellar disease, and recent bilateral pontine strokes.  - Continue DAPT with Aspirin 325 mg daily and Plavix 75 mg daily x90 days, then stop Plavix and continue Aspirin 325 mg daily only  - High intensity statin (atorvastatin 40 mg daily) to target LDL <70  - HbA1c at goal (6.3%)  - Long-term BP goal to 120/80  - PT noting festinating gait, very slow gait, intermittent freezing, cogwheeling, difficulty initiating motor tasks.  Plan to follow-up with neurology  - Continue  PT/OT  - Outpatient sleep evaluation for SELIN  - Follow up with neurology in 4-6 weeks  - Per hospital discharge summary, if gait has not improved after the pt's stay at acute rehab and/or by the time there is neurology follow up in the outpatient setting, please consider additional neurosurgery consultation.     #Peripheral neuropathy  #Decreased vibration sensation bilaterally in lower extremities  Per neurology, suspect that large fiber sensory dysfunction secondary to diabetes.  Vitamin B12/folate WNL, NEHA negative, HIV non reactive, RPR negative.  Copper, zinc WNL.  Protein electrophoresis with slightly elevated alpha 2, per pathologist, essentially normal, no obvious monoclonal proteins.  Homocysteine WNL, MMA WNL.  - Follow up with neurology for EMG     #HTN  [PTA enalapril 5mg PO daily]  Enalapril increased to 10 mg daily on 11/2 due to generally elevated BP, increased again to 20 mg daily on 11/13.  - BP remains intermittently elevated, especially in AM before meds.  Increase enalapril to 15 mg BID.  - Continue to monitor BP, adjust meds as indicated     #DMII  [PTA meds: metformin 1000 mg BID, glipizide 10 mg daily]  A1c 6.3%.  Patient reports compliance with medications, but that he was not taking his blood glucose at home as directed.    - Monitor blood glucose TID AC and HS.  BG currently 111-175.  - Continue PTA metformin 1g BID  - PTA glipizide 10 mg held throughout inpatient admission.  BG slightly elevated but also with variable intake.  Resumed at low dose 2.5 mg daily 11/2 but with mild hypoglycemia (66), discontinued until intake improved.  - Hypoglycemia protocol  - Will need new glucometer and supplies, diabetes educator consulted    Cr uptrending  Poor intake  0.86 -> 1.03 -> 1.13 on 11/3.  Poor oral fluid intake, also recently increased dose of enalapril. Patient agreeable to increasing oral fluids.  Improved.  However, recently decreased in the setting of impaired intake and not feeling well.  " S/p 1L IV fluids 11/15 given tachycardia, poor intake, repeat on 11/16 and 11/17 due to ongoing tachycardia, Cr slightly elevated.  - BMP 11/18 with Cr improved to 0.94.  HR improved to 70s.  Reports improving oral intake  - Repeat BMP Monday  - Encourage fluids    Constipation  No BM for several days on admission, then with episode of emesis 10/31 and loose stools x2, now with recurrent constipation.   Receiving Miralax and Senokot-S.  Refused suppository 11/3.  Added prune juice.  Has refused suppository x2 days.  Feeling distended/full.  No recurrent nausea/vomiting.  Bowel sounds present, passing gas.  AXR on 11/5 with no significant colonic stool burden, non-obstructive bowel gas pattern.  Did have small BM on 11/5 and has been more regular since, also frequently going several days without.  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  He notes decreased interest in food over the past year or so.  He has been sleeping poorly this admission.  Struggling with being away from family as well.  - Psych consult completed 11/5, appreciate assistance  - Remeron 7.5 mg started on 11/5 for mood, appetite, sleep.  Patient states that he is sleeping better.  Ongoing issues with depressed mood, but also with significant social stressors due to wife's illness.  Appetite remains impaired.  Increased to 15 mg daily on 11/9.  RD noting weight stabilized, intake improved.    Suspected HAP, LLL  Reported onset of symptoms over the weekend with \"very\" sore throat, cough, headache, nasal congestion.  VSS, temp mildly elevated at 99.5F, mildly tachycardic. Oxygen sats mid 90s on room air.  Labs 11/15: slightly elevated WBC (11.6), CRP mildly elevated at 19 (prior 12), procal 0.09.  " COVID (-) on 11/14.  Influenza (-) 11/15.  CXR revealed dense retrocardiac opacities, suspicious for infection.  Started on IV Zosyn and given IV fluids x3d.  Gradually improving symptoms.  WBC normalized 11/16.  CRP peaked to 78 on 11/16, downtrending since.  Procal <0.05 as of 11/17.  Blood culture NGTD, respiratory panel (-), repeat COVID PCR 11/17 negative.  Sputum culture with 4+ normal johana.  Remains afebrile, saturating well on room air.  - Continue comfort measures with PRN Tylenol, throat lozenge/spray, Afrin nasal spray, Tessalon (scheduled TID 11/18)  - Continue IV Zosyn QID to complete 7-day course  - Add aqua K pad for L-sided chest/rib pain  - Today with ongoing improvement in respiratory symptoms.  Plan for repeat labs Monday unless clinical change before.    Superficial phlebitis, right forearm  PIV site with erythema, induration, slightly warm to touch.  Non-tender.  Afebrile.  No discomfort with flushes.  - Remove PIV and replace in alternative location  - Warm compress PRN    1. Adjustment to disability:  Psych consulted completed, Remeron trial started 11/5, dose increased 11/9, continue to monitor.  2. FEN: regular diet, thin liquids  3. Bowel: continent, constipation as above, continue to monitor  4. Bladder: continent, PVRs at admission x3 <100, ok to monitor PRN only.    5. DVT Prophylaxis: mechanical  6. GI Prophylaxis: PPI given DAPT + age  7. Code: full, confirmed on admission  8. Disposition: goal for home.   9. ELOS: 3 weeks, planning for 11/24  10. Follow up Appointments on Discharge: PCP, stroke and general neurology         Patient discussed with Dr. Fredis Ordonez, PM&R staff physician       Bozena Bower PA-C  Physical Medicine & Rehabilitation

## 2021-11-19 NOTE — PLAN OF CARE
FOCUS/GOAL  Medical management    ASSESSMENT, INTERVENTIONS AND CONTINUING PLAN FOR GOAL:  Pt is alert and oriented. No complaints of pain. Assist of 1 with walker. Continent if bladder using urinal independently with staff emptying. Abx infused without difficulty. Pt upset he has early morning therapy. Pt prefers not to be scheduled for early therapy, will leave note and alert oncoming RN for future. Appeared to be sleeping on rounds.

## 2021-11-19 NOTE — PLAN OF CARE
"Discharge Planner Post-Acute Rehab PT:      Discharge Plan: Home with OP PT with discharge date anticipated 11/24     Precautions: Fall precaution     Current Status:  Bed Mobility: Ind  Transfer: CGA without device to complete stand pivot transfer  Gait: AMb >250 with 4ww and SBA. Intermittent freezing/festinating.  Stairs: 8 6\" steps with B railings and reciprocal pattern needing increased UE support for L step;  limited due to dizziness  Balance:   PASS:  22/36  Haas (11/17): 27/56  Gait Speed: .48 m/s,  this is a pathological gait speed     Assessment: Pt did well with 4ww during gait including turns, but still has most difficulty with distractions and attention.  Focused on consistent safe STS sequencing over KIMMIE.  Still slight weight-shift to R in standing due to mild LLE weakness vs RLE.    Other Barriers to Discharge (DME, Family Training, etc):   Mobility: 4ww, has from previous  "

## 2021-11-20 ENCOUNTER — APPOINTMENT (OUTPATIENT)
Dept: PHYSICAL THERAPY | Facility: CLINIC | Age: 76
DRG: 056 | End: 2021-11-20
Attending: PHYSICAL MEDICINE & REHABILITATION
Payer: MEDICARE

## 2021-11-20 ENCOUNTER — APPOINTMENT (OUTPATIENT)
Dept: OCCUPATIONAL THERAPY | Facility: CLINIC | Age: 76
DRG: 056 | End: 2021-11-20
Attending: PHYSICAL MEDICINE & REHABILITATION
Payer: MEDICARE

## 2021-11-20 LAB
BACTERIA BLD CULT: NO GROWTH
BACTERIA BLD CULT: NO GROWTH
GLUCOSE BLDC GLUCOMTR-MCNC: 143 MG/DL (ref 70–99)
GLUCOSE BLDC GLUCOMTR-MCNC: 170 MG/DL (ref 70–99)
GLUCOSE BLDC GLUCOMTR-MCNC: 79 MG/DL (ref 70–99)
GLUCOSE BLDC GLUCOMTR-MCNC: 88 MG/DL (ref 70–99)

## 2021-11-20 PROCEDURE — 97535 SELF CARE MNGMENT TRAINING: CPT | Mod: GO

## 2021-11-20 PROCEDURE — 97116 GAIT TRAINING THERAPY: CPT | Mod: GP

## 2021-11-20 PROCEDURE — 97110 THERAPEUTIC EXERCISES: CPT | Mod: GP

## 2021-11-20 PROCEDURE — 250N000011 HC RX IP 250 OP 636: Performed by: PHYSICIAN ASSISTANT

## 2021-11-20 PROCEDURE — 128N000003 HC R&B REHAB

## 2021-11-20 PROCEDURE — 99231 SBSQ HOSP IP/OBS SF/LOW 25: CPT | Performed by: PHYSICAL MEDICINE & REHABILITATION

## 2021-11-20 PROCEDURE — 97530 THERAPEUTIC ACTIVITIES: CPT | Mod: GP

## 2021-11-20 PROCEDURE — 250N000013 HC RX MED GY IP 250 OP 250 PS 637: Performed by: PHYSICIAN ASSISTANT

## 2021-11-20 RX ADMIN — PIPERACILLIN AND TAZOBACTAM 4.5 G: 4; .5 INJECTION, POWDER, FOR SOLUTION INTRAVENOUS at 19:16

## 2021-11-20 RX ADMIN — Medication 1 CAPSULE: at 09:22

## 2021-11-20 RX ADMIN — POLYETHYLENE GLYCOL 3350 17 G: 17 POWDER, FOR SOLUTION ORAL at 09:23

## 2021-11-20 RX ADMIN — SENNOSIDES AND DOCUSATE SODIUM 1 TABLET: 8.6; 5 TABLET ORAL at 20:27

## 2021-11-20 RX ADMIN — MIRTAZAPINE 15 MG: 7.5 TABLET, FILM COATED ORAL at 20:26

## 2021-11-20 RX ADMIN — CLOPIDOGREL BISULFATE 75 MG: 75 TABLET, FILM COATED ORAL at 09:21

## 2021-11-20 RX ADMIN — ASPIRIN 325 MG ORAL TABLET 325 MG: 325 PILL ORAL at 09:22

## 2021-11-20 RX ADMIN — INSULIN ASPART 1 UNITS: 100 INJECTION, SOLUTION INTRAVENOUS; SUBCUTANEOUS at 17:42

## 2021-11-20 RX ADMIN — METFORMIN HYDROCHLORIDE 1000 MG: 500 TABLET ORAL at 09:22

## 2021-11-20 RX ADMIN — PIPERACILLIN AND TAZOBACTAM 4.5 G: 4; .5 INJECTION, POWDER, FOR SOLUTION INTRAVENOUS at 12:35

## 2021-11-20 RX ADMIN — ATORVASTATIN CALCIUM 40 MG: 40 TABLET, FILM COATED ORAL at 20:27

## 2021-11-20 RX ADMIN — SENNOSIDES AND DOCUSATE SODIUM 1 TABLET: 8.6; 5 TABLET ORAL at 09:22

## 2021-11-20 RX ADMIN — PIPERACILLIN AND TAZOBACTAM 4.5 G: 4; .5 INJECTION, POWDER, FOR SOLUTION INTRAVENOUS at 02:37

## 2021-11-20 RX ADMIN — PANTOPRAZOLE SODIUM 40 MG: 40 TABLET, DELAYED RELEASE ORAL at 09:22

## 2021-11-20 RX ADMIN — ENALAPRIL MALEATE 15 MG: 5 TABLET ORAL at 20:27

## 2021-11-20 RX ADMIN — BENZONATATE 100 MG: 100 CAPSULE ORAL at 19:16

## 2021-11-20 RX ADMIN — ENALAPRIL MALEATE 15 MG: 5 TABLET ORAL at 09:22

## 2021-11-20 RX ADMIN — Medication 1 CAPSULE: at 20:27

## 2021-11-20 RX ADMIN — BENZONATATE 100 MG: 100 CAPSULE ORAL at 09:22

## 2021-11-20 RX ADMIN — METFORMIN HYDROCHLORIDE 1000 MG: 500 TABLET ORAL at 17:38

## 2021-11-20 RX ADMIN — BENZONATATE 100 MG: 100 CAPSULE ORAL at 15:09

## 2021-11-20 ASSESSMENT — ACTIVITIES OF DAILY LIVING (ADL)
ADLS_ACUITY_SCORE: 12

## 2021-11-20 NOTE — PLAN OF CARE
Discharge Planner Post-Acute Rehab OT:      Discharge Plan: home with wife; however, wife in hospital currently, as well     Precautions: falls     Current Status:  ADLs:   G/H - standing/sitting EOS with Uwalker/4WW SBA with set up, CGA standing d/t fatigue  U/b - seated SBA , A to fasten buttons  L/B dressing - seated in chair doff/ don sba   And sba  Standing to don over hips. Pt used reacher to assist with doff due to rib pain from coughing from pnumonia   Feet: sitting in chair in room, pt able to cross leg over other and doff/don with no effort. Don/doff shoes on floor with long shoe horn  Shower: sba min cues for sequencing slower processing of shower task pt able to wash all areas  inclusing crossing leg over other to gt lowerr leg and feet and standing with cga  with rail for pericares from front and back  Tub/shower transfer: CGA     IADLs: TBA     Vision/Cognition: slums increased to 27/30  Will continue functional cog tasks     Assessment:. Pt verbalizing and presenting with more fatigue this am but willing to make effort. Completed TB ADLs and sponge bath standing at sink w/ moderate effort and additional time. Low standing tolerance this date and declined to stand for sink ADLs. Pt continues to show improved performance w/ LB dressing but required assist w/ footwear even w/ LHSH but barriers continue w/ variable activity tolerance, strength and balance.     Cont. To progress I w/ ADLs, activity tolerance, standing, UE strength and increasing functional ambulation w/ FWW for home distance.     Other Barriers to Discharge (DME, Family Training, etc): daughter in law PT pt has rolling walker and higher toilets at home. Tub bench or small shower stool depending on which bathroom pt decides to use.

## 2021-11-20 NOTE — PLAN OF CARE
"Discharge Planner Post-Acute Rehab PT:      Discharge Plan: Home with OP PT with discharge date anticipated 11/24     Precautions: Fall precaution     Current Status:  Bed Mobility: Ind  Transfer: CGA without device to complete stand pivot transfer  Gait: AMb >250 with 4ww and SBA. Intermittent freezing/festinating.  Stairs: 8 6\" steps with B railings and reciprocal pattern needing increased UE support for L step;  limited due to dizziness  Balance:   PASS:  22/36  Haas (11/17): 27/56  Gait Speed: .48 m/s,  this is a pathological gait speed     Assessment:PT: progressed gait in hallway multiple reps with seated rest between 150 -200 feet in reps FWW;  part to whole training to improve primary problems of: shuffling gait, and short step length L> R;  standing weight shifts R with L LE march pre gait;  then brought this back to the whole gait pattern cues and min assist facilitation for R weight shift and L LE swing at the pelvis.  pt noted improved step length, height and gavi.  ability to maintain this somewhat with faded facilitation as well.       Improved eccentric lowering and hip lift sit to and from stand with PT cues.        Other Barriers to Discharge (DME, Family Training, etc):   Mobility: 4ww, has from previous                "

## 2021-11-20 NOTE — PLAN OF CARE
FOCUS/GOAL  Medication management and Safety management    ASSESSMENT, INTERVENTIONS AND CONTINUING PLAN FOR GOAL:    Alert and oriented x4, with pain on back r/t coughing, heat pad in place and reassessed for readjustment of warmth. On regular and thin liquids diet, ate 75%. Continent of bladder, used the urinal, no bm thi shift, LBM: this morning 11/19. L PIV patent and intermittent IV antibiotic infusing well, PIV saline flushed. R forearm old site still pinkish-red and with hardened spot. Assist of 1 with walker/gait belt for transfers. Sleeping at end of shift with alarms off as car planned.

## 2021-11-20 NOTE — PLAN OF CARE
FOCUS/GOAL  Bowel management, Bladder management, and Pain management    ASSESSMENT, INTERVENTIONS AND CONTINUING PLAN FOR GOAL:    Pt slept well during the overnight, denies pain. Continent of bowel and bladder. Using call light and able to make needs known. Continue with plan of care.

## 2021-11-20 NOTE — PLAN OF CARE
"FOCUS/GOAL  Medication management, Medical management and Prevention of secondary complications    ASSESSMENT, INTERVENTIONS AND CONTINUING PLAN FOR GOAL:  Patient is alert/oriented x4, has been using call light appropriately for care needs.  Patient was frustrated this morning and feeling \"off,\" reported that it was not r/t anything medical- VSS and no new complaints with pain but patient reported poor/interupted sleep last night and felt that was why.  BG this am was 81, did have tray right at that time and ate well for meal, before lunch it was only 79 -again had tray right at that time and was able to eat well for meals, is not getting any insulin just PO metformin.  Went to start flush PIV for antibiotics this morning and patient's IV from yesterday had infiltrated, MD notified and it did require to be replaced, flyer RN started new PIV in right hand and patient tolerated well, IV flushing well.  Patient is able to ambulate with walker and CGAo1, no additional care concerns, continue with POC.      "

## 2021-11-21 ENCOUNTER — APPOINTMENT (OUTPATIENT)
Dept: OCCUPATIONAL THERAPY | Facility: CLINIC | Age: 76
DRG: 056 | End: 2021-11-21
Attending: PHYSICAL MEDICINE & REHABILITATION
Payer: MEDICARE

## 2021-11-21 ENCOUNTER — APPOINTMENT (OUTPATIENT)
Dept: PHYSICAL THERAPY | Facility: CLINIC | Age: 76
DRG: 056 | End: 2021-11-21
Attending: PHYSICAL MEDICINE & REHABILITATION
Payer: MEDICARE

## 2021-11-21 LAB
GLUCOSE BLDC GLUCOMTR-MCNC: 107 MG/DL (ref 70–99)
GLUCOSE BLDC GLUCOMTR-MCNC: 123 MG/DL (ref 70–99)
GLUCOSE BLDC GLUCOMTR-MCNC: 125 MG/DL (ref 70–99)
GLUCOSE BLDC GLUCOMTR-MCNC: 136 MG/DL (ref 70–99)

## 2021-11-21 PROCEDURE — 97116 GAIT TRAINING THERAPY: CPT | Mod: GP

## 2021-11-21 PROCEDURE — 97116 GAIT TRAINING THERAPY: CPT | Mod: GP | Performed by: PHYSICAL THERAPIST

## 2021-11-21 PROCEDURE — 128N000003 HC R&B REHAB

## 2021-11-21 PROCEDURE — 250N000011 HC RX IP 250 OP 636: Performed by: PHYSICIAN ASSISTANT

## 2021-11-21 PROCEDURE — 250N000013 HC RX MED GY IP 250 OP 250 PS 637: Performed by: PHYSICIAN ASSISTANT

## 2021-11-21 PROCEDURE — 97110 THERAPEUTIC EXERCISES: CPT | Mod: GO | Performed by: OCCUPATIONAL THERAPIST

## 2021-11-21 PROCEDURE — 97530 THERAPEUTIC ACTIVITIES: CPT | Mod: GP

## 2021-11-21 PROCEDURE — 97110 THERAPEUTIC EXERCISES: CPT | Mod: GP | Performed by: PHYSICAL THERAPIST

## 2021-11-21 PROCEDURE — 97530 THERAPEUTIC ACTIVITIES: CPT | Mod: GO | Performed by: OCCUPATIONAL THERAPIST

## 2021-11-21 RX ADMIN — PIPERACILLIN AND TAZOBACTAM 4.5 G: 4; .5 INJECTION, POWDER, FOR SOLUTION INTRAVENOUS at 06:28

## 2021-11-21 RX ADMIN — METFORMIN HYDROCHLORIDE 1000 MG: 500 TABLET ORAL at 17:38

## 2021-11-21 RX ADMIN — Medication 1 CAPSULE: at 20:24

## 2021-11-21 RX ADMIN — BENZONATATE 100 MG: 100 CAPSULE ORAL at 08:31

## 2021-11-21 RX ADMIN — BENZONATATE 100 MG: 100 CAPSULE ORAL at 20:24

## 2021-11-21 RX ADMIN — ENALAPRIL MALEATE 15 MG: 5 TABLET ORAL at 20:24

## 2021-11-21 RX ADMIN — PIPERACILLIN AND TAZOBACTAM 4.5 G: 4; .5 INJECTION, POWDER, FOR SOLUTION INTRAVENOUS at 18:33

## 2021-11-21 RX ADMIN — BENZONATATE 100 MG: 100 CAPSULE ORAL at 13:01

## 2021-11-21 RX ADMIN — MIRTAZAPINE 15 MG: 7.5 TABLET, FILM COATED ORAL at 20:25

## 2021-11-21 RX ADMIN — METFORMIN HYDROCHLORIDE 1000 MG: 500 TABLET ORAL at 08:31

## 2021-11-21 RX ADMIN — CLOPIDOGREL BISULFATE 75 MG: 75 TABLET, FILM COATED ORAL at 08:31

## 2021-11-21 RX ADMIN — PIPERACILLIN AND TAZOBACTAM 4.5 G: 4; .5 INJECTION, POWDER, FOR SOLUTION INTRAVENOUS at 12:19

## 2021-11-21 RX ADMIN — Medication 1 CAPSULE: at 08:31

## 2021-11-21 RX ADMIN — PANTOPRAZOLE SODIUM 40 MG: 40 TABLET, DELAYED RELEASE ORAL at 08:31

## 2021-11-21 RX ADMIN — ENALAPRIL MALEATE 15 MG: 5 TABLET ORAL at 08:31

## 2021-11-21 RX ADMIN — ASPIRIN 325 MG ORAL TABLET 325 MG: 325 PILL ORAL at 08:31

## 2021-11-21 RX ADMIN — ATORVASTATIN CALCIUM 40 MG: 40 TABLET, FILM COATED ORAL at 20:24

## 2021-11-21 RX ADMIN — PIPERACILLIN AND TAZOBACTAM 4.5 G: 4; .5 INJECTION, POWDER, FOR SOLUTION INTRAVENOUS at 00:37

## 2021-11-21 ASSESSMENT — ACTIVITIES OF DAILY LIVING (ADL)
ADLS_ACUITY_SCORE: 12

## 2021-11-21 NOTE — PLAN OF CARE
FOCUS/GOAL  Bowel management, Bladder management, and Pain management    ASSESSMENT, INTERVENTIONS AND CONTINUING PLAN FOR GOAL:    Pt slept well during the overnight, denies pain. Using call light and able to make needs known. IV antibiotic infusing on  right PIV . Continent on bladder and uses urinal at night. Continue with plan care.

## 2021-11-21 NOTE — PROGRESS NOTES
"  [facility]   Acute Rehabilitation Unit  Daily progress note    INTERVAL HISTORY  Jose was seen and examined at bedside this morning.  Noted that he is \"feeling off\" today; just more fatigued and drowsy. Complete ROM was otherwise negative for any acute issues. Not sleeping well at night.       MEDICATIONS    aspirin  325 mg Oral Daily     atorvastatin  40 mg Oral QPM     benzonatate  100 mg Oral TID     clopidogrel  75 mg Oral Daily     enalapril  15 mg Oral BID     influenza vac high-dose quad  0.7 mL Intramuscular Prior to discharge     insulin aspart  1-7 Units Subcutaneous TID AC     insulin aspart  1-5 Units Subcutaneous At Bedtime     lactobacillus rhamnosus (GG)  1 capsule Oral BID     metFORMIN  1,000 mg Oral BID w/meals     mirtazapine  15 mg Oral At Bedtime     pantoprazole  40 mg Oral QAM AC     piperacillin-tazobactam  4.5 g Intravenous Q6H     polyethylene glycol  17 g Oral Daily     senna-docusate  1 tablet Oral BID     sodium chloride (PF)  3 mL Intracatheter Q8H        acetaminophen, sore throat lozenge, bisacodyl, glucose **OR** dextrose **OR** glucagon, melatonin, oxymetazoline, phenol, senna-docusate, sodium chloride (PF)     PHYSICAL EXAM  /73 (BP Location: Left arm)   Pulse 77   Temp 97.9  F (36.6  C) (Oral)   Resp 20   Ht 1.778 m (5' 10\")   Wt 85 kg (187 lb 8 oz)   SpO2 96%   BMI 26.90 kg/m       Gen: NAD, pleasant   Pulm: non-labored in room air   Abd: soft, non-tender  Ext: no edema in bilateral lower extremities  Neuro/MSK: alert and oriented, antigravity strength in bilateral upper and lower extremities          ASSESSMENT AND PLAN  Jose Mallory is a 76 year old right hand dominant male with a past medical history of DM2, HTN, HLD, GERD, posterior fossa arachnoid cyst, peripheral neuropathy, prior R pontine stroke, gait instability, and bilateral knee replacement who was admitted on 10/26/21 with progressive lower extremity weakness and " gait difficulties, found to have subacute left pontine stroke with course complicated by neuropathy.  He is now admitted to ARU on 10/29 for multidisciplinary rehabilitation and ongoing medical management.       --Vitals stable. No lab today.  --Continue ongoing medical management. No change in BP meds; BP was well controlled this am. IV line was infiltrated and uncomfortable. It was replaced to continue antibiotic course.   --Continue therapies and plan of care. Required CGA to SBA for transfers and most ADLs; seems to be on track for discharge next week.       Nini Colby MD  Physical Medicine & Rehabilitation

## 2021-11-21 NOTE — PLAN OF CARE
"Discharge Planner Post-Acute Rehab PT:      Discharge Plan: Home with OP PT with discharge date anticipated 11/24     Precautions: Fall precaution     Current Status:  Bed Mobility: Ind  Transfer: CGA without device to complete stand pivot transfer  Gait: AMb >250 with 4ww and SBA. Intermittent freezing/festinating.  Stairs: 8 6\" steps with B railings and reciprocal pattern needing increased UE support for L step;  limited due to dizziness  Balance:   PASS:  22/36  Haas (11/17): 27/56  Gait Speed: .48 m/s,  this is a pathological gait speed     Assessment:PT: progressed gait in hallway multiple reps with seated rest between 150 -200 feet in reps 4WW and FWW;  part to whole training to improve primary problems of: shuffling gait, and short step length L> R;  standing weight shifts R with L LE march pre gait;  then brought this back to the whole gait pattern cues and min assist facilitation for R weight shift and L LE swing at the pelvis.  pt noted improved step length, height and gavi.  ability to maintain this somewhat with faded facilitation as well.        Improved eccentric lowering and hip lift sit to and from stand with PT cues.         **may need 24 hour assist if discharging next week; coordinate family training: progressing well but requires cga x 1 at this time.      Other Barriers to Discharge (DME, Family Training, etc):   Mobility: 4ww, has from previous                                   "

## 2021-11-21 NOTE — PLAN OF CARE
FOCUS/GOAL  Bladder management, Medical management, and Prevention of secondary complications    ASSESSMENT, INTERVENTIONS AND CONTINUING PLAN FOR GOAL:  Pt A/O x 4, assist of 1 with a walker.  Pt continent of bowel and bladder, LBM today.  Up to toilet and uses urinal when in bed.  Pt on IV antibiotic, right PIV is patent.  No skin integrity issues noted this shift. Pt uses call light appropriately.  Patient verbalized relief to be discharging next week.

## 2021-11-21 NOTE — PLAN OF CARE
Discharge Planner Post-Acute Rehab OT:      Discharge Plan: home with wife; however, wife in hospital currently, as well     Precautions: falls     Current Status:  ADLs:   G/H - standing/sitting EOS with Uwalker/4WW SBA with set up, CGA standing d/t fatigue  U/b - seated SBA , A to fasten buttons  L/B dressing - seated in chair doff/ don sba   And sba  Standing to don over hips. Pt used reacher to assist with doff due to rib pain from coughing from pnumonia   Feet: sitting in chair in room, pt able to cross leg over other and doff/don with no effort. Don/doff shoes on floor with long shoe horn  Shower: sba min cues for sequencing slower processing of shower task pt able to wash all areas  inclusing crossing leg over other to gt lowerr leg and feet and standing with cga  with rail for pericares from front and back  Tub/shower transfer: CGA     IADLs: TBA     Vision/Cognition: slums increased to 27/30  Will continue functional cog tasks     Assessment:. Pt agreeable to ambulation and standing to build endurance and strength with ADL and IADL.  Pt tolerated activity well during session and requiring cues to take smaller steps and keep walker close to him when ambulating and making turns.  Pt then engaged in UE strengthening during PM session, tolerated activity well    Other Barriers to Discharge (DME, Family Training, etc): daughter in law PT pt has rolling walker and higher toilets at home. Tub bench or small shower stool depending on which bathroom pt decides to use.

## 2021-11-21 NOTE — PLAN OF CARE
"FOCUS/GOAL  Medical management, Cognition/Memory/Judgment/Problem solving, Reinforcement of self-care/ADL and Safety management    ASSESSMENT, INTERVENTIONS AND CONTINUING PLAN FOR GOAL:  Patient is alert/oriented x4, is able to understand and direct cares appropriately.  Patient was caught self-transferring x1 today from chair to bed without walker, the  was in and patient asked her for a hand to steady.  Patient did make it to bed but had a discussion at that time of safety and choices involving transfers in room, reinforced that nursing staff needs to be present for the transfers and patient admitted that this was not the safest choice, stated \"it was just an arms length so I thought it would be ok.\"  PT updated on this, patient is receptive to teaching, continue to reinforce.  Patient denies any pain, reports better sleep last night than the night before, no new sensation changes or weakness.  Patient voiding spontaneously and can use the urinal at bedside.  IV abx q6 hours infused without concerns.  No additional care concerns, continue with POC.        "

## 2021-11-22 ENCOUNTER — APPOINTMENT (OUTPATIENT)
Dept: PHYSICAL THERAPY | Facility: CLINIC | Age: 76
DRG: 056 | End: 2021-11-22
Attending: PHYSICAL MEDICINE & REHABILITATION
Payer: MEDICARE

## 2021-11-22 ENCOUNTER — APPOINTMENT (OUTPATIENT)
Dept: OCCUPATIONAL THERAPY | Facility: CLINIC | Age: 76
DRG: 056 | End: 2021-11-22
Attending: PHYSICAL MEDICINE & REHABILITATION
Payer: MEDICARE

## 2021-11-22 LAB
ANION GAP SERPL CALCULATED.3IONS-SCNC: 4 MMOL/L (ref 3–14)
BUN SERPL-MCNC: 7 MG/DL (ref 7–30)
CALCIUM SERPL-MCNC: 8.2 MG/DL (ref 8.5–10.1)
CHLORIDE BLD-SCNC: 107 MMOL/L (ref 94–109)
CO2 SERPL-SCNC: 29 MMOL/L (ref 20–32)
CREAT SERPL-MCNC: 0.9 MG/DL (ref 0.66–1.25)
CRP SERPL-MCNC: 22.2 MG/L (ref 0–8)
ERYTHROCYTE [DISTWIDTH] IN BLOOD BY AUTOMATED COUNT: 12.1 % (ref 10–15)
GFR SERPL CREATININE-BSD FRML MDRD: 82 ML/MIN/1.73M2
GLUCOSE BLD-MCNC: 99 MG/DL (ref 70–99)
GLUCOSE BLDC GLUCOMTR-MCNC: 140 MG/DL (ref 70–99)
GLUCOSE BLDC GLUCOMTR-MCNC: 164 MG/DL (ref 70–99)
GLUCOSE BLDC GLUCOMTR-MCNC: 88 MG/DL (ref 70–99)
HCT VFR BLD AUTO: 36.7 % (ref 40–53)
HGB BLD-MCNC: 12.4 G/DL (ref 13.3–17.7)
MCH RBC QN AUTO: 30.1 PG (ref 26.5–33)
MCHC RBC AUTO-ENTMCNC: 33.8 G/DL (ref 31.5–36.5)
MCV RBC AUTO: 89 FL (ref 78–100)
PLATELET # BLD AUTO: 165 10E3/UL (ref 150–450)
POTASSIUM BLD-SCNC: 3.2 MMOL/L (ref 3.4–5.3)
RBC # BLD AUTO: 4.12 10E6/UL (ref 4.4–5.9)
SODIUM SERPL-SCNC: 140 MMOL/L (ref 133–144)
WBC # BLD AUTO: 6.7 10E3/UL (ref 4–11)

## 2021-11-22 PROCEDURE — 85027 COMPLETE CBC AUTOMATED: CPT | Performed by: PHYSICIAN ASSISTANT

## 2021-11-22 PROCEDURE — 128N000003 HC R&B REHAB

## 2021-11-22 PROCEDURE — 250N000013 HC RX MED GY IP 250 OP 250 PS 637: Performed by: PHYSICIAN ASSISTANT

## 2021-11-22 PROCEDURE — 97535 SELF CARE MNGMENT TRAINING: CPT | Mod: GO

## 2021-11-22 PROCEDURE — 97530 THERAPEUTIC ACTIVITIES: CPT | Mod: GP

## 2021-11-22 PROCEDURE — 99233 SBSQ HOSP IP/OBS HIGH 50: CPT | Performed by: PHYSICAL MEDICINE & REHABILITATION

## 2021-11-22 PROCEDURE — 97112 NEUROMUSCULAR REEDUCATION: CPT | Mod: GP

## 2021-11-22 PROCEDURE — 97116 GAIT TRAINING THERAPY: CPT | Mod: GP

## 2021-11-22 PROCEDURE — 80048 BASIC METABOLIC PNL TOTAL CA: CPT | Performed by: PHYSICIAN ASSISTANT

## 2021-11-22 PROCEDURE — 250N000011 HC RX IP 250 OP 636: Performed by: PHYSICIAN ASSISTANT

## 2021-11-22 PROCEDURE — 36415 COLL VENOUS BLD VENIPUNCTURE: CPT | Performed by: PHYSICIAN ASSISTANT

## 2021-11-22 PROCEDURE — 999N000127 HC STATISTIC PERIPHERAL IV START W US GUIDANCE

## 2021-11-22 PROCEDURE — 86140 C-REACTIVE PROTEIN: CPT | Performed by: PHYSICIAN ASSISTANT

## 2021-11-22 RX ORDER — POTASSIUM CHLORIDE 750 MG/1
20 TABLET, EXTENDED RELEASE ORAL DAILY
Status: DISCONTINUED | OUTPATIENT
Start: 2021-11-22 | End: 2021-11-24 | Stop reason: HOSPADM

## 2021-11-22 RX ADMIN — PIPERACILLIN AND TAZOBACTAM 4.5 G: 4; .5 INJECTION, POWDER, FOR SOLUTION INTRAVENOUS at 00:56

## 2021-11-22 RX ADMIN — BENZONATATE 100 MG: 100 CAPSULE ORAL at 14:32

## 2021-11-22 RX ADMIN — Medication 1 CAPSULE: at 08:44

## 2021-11-22 RX ADMIN — BENZONATATE 100 MG: 100 CAPSULE ORAL at 08:44

## 2021-11-22 RX ADMIN — CLOPIDOGREL BISULFATE 75 MG: 75 TABLET, FILM COATED ORAL at 08:43

## 2021-11-22 RX ADMIN — BENZONATATE 100 MG: 100 CAPSULE ORAL at 20:08

## 2021-11-22 RX ADMIN — METFORMIN HYDROCHLORIDE 1000 MG: 500 TABLET ORAL at 08:43

## 2021-11-22 RX ADMIN — Medication 1 CAPSULE: at 20:07

## 2021-11-22 RX ADMIN — PANTOPRAZOLE SODIUM 40 MG: 40 TABLET, DELAYED RELEASE ORAL at 08:43

## 2021-11-22 RX ADMIN — PIPERACILLIN AND TAZOBACTAM 4.5 G: 4; .5 INJECTION, POWDER, FOR SOLUTION INTRAVENOUS at 06:13

## 2021-11-22 RX ADMIN — MIRTAZAPINE 15 MG: 7.5 TABLET, FILM COATED ORAL at 20:07

## 2021-11-22 RX ADMIN — ATORVASTATIN CALCIUM 40 MG: 40 TABLET, FILM COATED ORAL at 20:08

## 2021-11-22 RX ADMIN — POTASSIUM CHLORIDE 20 MEQ: 750 TABLET, EXTENDED RELEASE ORAL at 09:57

## 2021-11-22 RX ADMIN — METFORMIN HYDROCHLORIDE 1000 MG: 500 TABLET ORAL at 17:45

## 2021-11-22 RX ADMIN — ASPIRIN 325 MG ORAL TABLET 325 MG: 325 PILL ORAL at 08:43

## 2021-11-22 RX ADMIN — PIPERACILLIN AND TAZOBACTAM 4.5 G: 4; .5 INJECTION, POWDER, FOR SOLUTION INTRAVENOUS at 12:32

## 2021-11-22 RX ADMIN — ENALAPRIL MALEATE 15 MG: 5 TABLET ORAL at 08:43

## 2021-11-22 RX ADMIN — ENALAPRIL MALEATE 15 MG: 5 TABLET ORAL at 20:07

## 2021-11-22 ASSESSMENT — ACTIVITIES OF DAILY LIVING (ADL)
ADLS_ACUITY_SCORE: 15
ADLS_ACUITY_SCORE: 12
ADLS_ACUITY_SCORE: 15
ADLS_ACUITY_SCORE: 12
ADLS_ACUITY_SCORE: 15
ADLS_ACUITY_SCORE: 12
ADLS_ACUITY_SCORE: 12
ADLS_ACUITY_SCORE: 15
ADLS_ACUITY_SCORE: 12
ADLS_ACUITY_SCORE: 12
ADLS_ACUITY_SCORE: 15
ADLS_ACUITY_SCORE: 12
ADLS_ACUITY_SCORE: 15
ADLS_ACUITY_SCORE: 15
ADLS_ACUITY_SCORE: 12
ADLS_ACUITY_SCORE: 15
ADLS_ACUITY_SCORE: 12
ADLS_ACUITY_SCORE: 12
ADLS_ACUITY_SCORE: 15

## 2021-11-22 NOTE — PLAN OF CARE
Patient alert and oriented x4. A1/CGA with walker and gait belt for transfer. Regular diet, thins, pills whole. PIV in R hand infiltrated in early morning, flyer called and new PIV started in L forearm for scheduled IV antibiotic. Potassium 3.2 this morning, replacement given as ordered.  and 125 today. Discussed with therapy possible extension on discharge. Pt denied pain throughout day. Will continue with POC.

## 2021-11-22 NOTE — PLAN OF CARE
"Discharge Planner Post-Acute Rehab PT:      Discharge Plan: Home with OP PT with discharge date anticipated 11/24     Precautions: Fall precaution     Current Status:  Bed Mobility: Ind  Transfer: CGA without device to complete stand pivot transfer  Gait: AMb >250 with 4ww and SBA. Intermittent freezing/festinating.  Stairs: 8 6\" steps with B railings and reciprocal pattern needing increased UE support for L step;  limited due to dizziness  Balance:   PASS:  22/36  Haas (11/17): 27/56  Gait Speed: .48 m/s,  this is a pathological gait speed     Assessment: Per team some reports of concern for home discharge w/o more robust support from family. Pt initiating discussion w/ writer and reasserts strong desire to discharge home. Discussed option of family training w/ pt and reconsider ability to have increased support at home, pt reports will discuss with family. Hand off to primary team to discuss tomorrow. Functionally, pt doing well, SBA grossly in PM session, but AM session needing more assistance, LOB w/ ambulation and ADLs.      Other Barriers to Discharge (DME, Family Training, etc):   Mobility: 4ww, has from previous  "

## 2021-11-22 NOTE — PROGRESS NOTES
Met with pt at bedside. Pt asking why he can't transfer himself from the chair to bed if he is going home on Wednesday. Will ask therapy to f/u. Pt also asking about walker at discharge. Will notify this of this as well. Pt stated that his wife is home from the hospital and he is looking forward to his discharge. SW gave pt IMM and pt signed. Denied additional needs. Aware of HC acceptance and set up. SW will send discharge order and ppwk at discharge. On track to discharge home Wed 11/24. Son will transport home.     [facility]-- ([place]) PH: [phone], F: [phone]   RN, PT, OT, THOMAS and MAVIS [facility]ck[facility]   Direct Phone: [phone]  I   Pager: [phone]  I  Fax: [phone]

## 2021-11-22 NOTE — PROGRESS NOTES
[facility]   Acute Rehabilitation Unit  Daily progress note    INTERVAL HISTORY  Jose was seen up with therapy completing transfer, was able to complete with sba though did receive cues for safety.  He says breathing is improved denies n/v/d, denies sob, denies fevers, denies dizziness.  Gait unsteady at times, no yet made MOD I in room, his wife was recently discharged home from hospital.  He says family can provide check in support and assistance with errands etc but not more consistent supervision.      OT:   Current Status:  ADLs:   G/H - standing/sitting EOS with Uwalker/4WW SBA with set up, CGA standing d/t fatigue  U/b - seated SBA , A to fasten buttons  L/B dressing - seated in chair doff/ don sba   And sba  Standing to don over hips. Pt used reacher to assist with doff due to rib pain from coughing from pnumonia   Feet: sitting in chair in room, pt able to cross leg over other and doff/don with no effort. Don/doff shoes on floor with long shoe horn. Variable assistance w/ LB dressing, pt requires min A occasionally with and w/o use of AE.   Shower: sba min cues for sequencing slower processing of shower task pt able to wash all areas  inclusing crossing leg over other to gt lowerr leg and feet and standing with cga  with rail for pericares from front and back.   Tub/shower transfer: SBA-CGA w/ safety cues, pt had improved performance w/ walk in shower onto shower chair.    MEDICATIONS    aspirin  325 mg Oral Daily     atorvastatin  40 mg Oral QPM     benzonatate  100 mg Oral TID     clopidogrel  75 mg Oral Daily     enalapril  15 mg Oral BID     influenza vac high-dose quad  0.7 mL Intramuscular Prior to discharge     insulin aspart  1-7 Units Subcutaneous TID AC     insulin aspart  1-5 Units Subcutaneous At Bedtime     lactobacillus rhamnosus (GG)  1 capsule Oral BID     metFORMIN  1,000 mg Oral BID w/meals     mirtazapine  15 mg Oral At Bedtime     pantoprazole  40 mg  "Oral QAM AC     polyethylene glycol  17 g Oral Daily     potassium chloride  20 mEq Oral Daily     senna-docusate  1 tablet Oral BID     sodium chloride (PF)  3 mL Intracatheter Q8H        acetaminophen, sore throat lozenge, bisacodyl, glucose **OR** dextrose **OR** glucagon, melatonin, oxymetazoline, phenol, senna-docusate, sodium chloride (PF)     PHYSICAL EXAM  BP (!) 158/87 (BP Location: Right arm)   Pulse 84   Temp 98  F (36.7  C) (Oral)   Resp 18   Ht 1.778 m (5' 10\")   Wt 85 kg (187 lb 8 oz)   SpO2 96%   BMI 26.90 kg/m     Gen: NAD  HEENT: NC/AT  Pulm: non-labored on room air  Abd:  non-distended  Ext: no edema in bilateral lower extremities  Neuro/MSK: AAOx4, follows commands, mild facial droop on L up with sba-cga with ambulation.    LABS  CBC RESULTS:   Recent Labs   Lab Test 11/22/21  0559 11/18/21  0817 11/17/21  0645   WBC 6.7 7.6 7.6   RBC 4.12* 4.21* 4.35*   HGB 12.4* 13.0* 13.3   HCT 36.7* 37.9* 39.4*   MCV 89 90 91   MCH 30.1 30.9 30.6   MCHC 33.8 34.3 33.8   RDW 12.1 11.9 11.9    132* 123*     Last Basic Metabolic Panel:  Recent Labs   Lab Test 11/22/21  0847 11/22/21  0559 11/21/21  2134 11/18/21  1117 11/18/21  0817 11/17/21  1212 11/17/21  0645   NA  --  140  --   --  140  --  142   POTASSIUM  --  3.2*  --   --  3.7  --  4.2   CHLORIDE  --  107  --   --  110*  --  112*   CO2  --  29  --   --  23  --  29   ANIONGAP  --  4  --   --  7  --  1*   * 99 125*   < > 125*   < > 123*   BUN  --  7  --   --  12  --  11   CR  --  0.90  --   --  0.94  --  1.11   GFRESTIMATED  --  82  --   --  77  --  63   LEON  --  8.2*  --   --  8.2*  --  8.6    < > = values in this interval not displayed.       Rehabilitation - continue comprehensive acute inpatient rehabilitation program with multidisciplinary approach including therapies, rehab nursing, and physiatry following. See interval history for updates.      ASSESSMENT AND PLAN  Jose Mallory is a 76 year old right hand dominant male with a " past medical history of DM2, HTN, HLD, GERD, posterior fossa arachnoid cyst, peripheral neuropathy, prior R pontine stroke, gait instability, and bilateral knee replacement who was admitted on 10/26/21 with progressive lower extremity weakness and gait difficulties, found to have subacute left pontine stroke with course complicated by neuropathy.  He is now admitted to ARU on 10/29 for multidisciplinary rehabilitation and ongoing medical management.       #Subacute L pontine stroke  #Subacute to chronic R pontine stroke  #Gait instability  #Arachnoid cyst  #Moderate cervical canal stenosis with disc herniation most pronounced at C3-C4, C4-C5   MRI with subacute cerebral infarction in left ventral cookie.  TTE EF 60-65%, no significant valvular disease or cardiac source for embolus.  Telemetry with NSR and no evidence of atrial fibrillation.  IV tPA was not initiated due to unclear or unfavorable risk-benefit profile for extended window thrombolysis beyond the conventional 4.5 hour time window.  Etiology of stroke thought to be atherosclerotic. Gait instability suspected to be multifactorial including peripheral neuropathy, cerebellar disease, and recent bilateral pontine strokes.  - Continue DAPT with Aspirin 325 mg daily and Plavix 75 mg daily x90 days, then stop Plavix and continue Aspirin 325 mg daily only  - High intensity statin (atorvastatin 40 mg daily) to target LDL <70  - HbA1c at goal (6.3%)  - Long-term BP goal to 120/80  - PT noting festinating gait, very slow gait, intermittent freezing, cogwheeling, difficulty initiating motor tasks.  Plan to follow-up with neurology  - Continue PT/OT  - Outpatient sleep evaluation for SELIN  - Follow up with stroke neurology in 4-6 weeks  - Per hospital discharge summary, if gait has not improved after the pt's stay at acute rehab and/or by the time there is neurology follow up in the outpatient setting, please consider additional neurosurgery  consultation.     #Peripheral neuropathy  #Decreased vibration sensation bilaterally in lower extremities  Per neurology, suspect that large fiber sensory dysfunction secondary to diabetes.  Vitamin B12/folate WNL, NEHA negative, HIV non reactive, RPR negative.  Copper, zinc WNL.  Protein electrophoresis with slightly elevated alpha 2, per pathologist, essentially normal, no obvious monoclonal proteins.  Homocysteine WNL, MMA WNL.  - Follow up with neurology for EMG     #HTN  [PTA enalapril 5mg PO daily]  Enalapril increased to 10 mg daily on 11/2 due to generally elevated BP, increased again to 20 mg daily on 11/13.  - continue enalapril to 15 mg BID.  - Continue to monitor BP, adjust meds as indicated     #DMII  [PTA meds: metformin 1000 mg BID, glipizide 10 mg daily]  A1c 6.3%.  Patient reports compliance with medications, but that he was not taking his blood glucose at home as directed.  Glucose 107-170  - Continue PTA metformin 1g BID.   - Will need new glucometer and supplies, diabetes educator consulted    #Hypokalemia  Mild K 3.2  Replace 20 mEQ daily recheck 11/24    #Constipation    Receiving Miralax and Senokot-S.   AXR on 11/5 with no significant colonic stool burden, non-obstructive bowel gas pattern.    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  He notes decreased interest in food over the past year or so.  He has been sleeping poorly this admission.  Struggling with being away from family as well.  - Psych consult completed 11/5, appreciate assistance  - Remeron 7.5 mg started on 11/5 for mood, appetite, sleep.  Patient states that he is sleeping better.  Ongoing issues with depressed mood, but also with significant social stressors due to wife's  "illness.  Appetite remains impaired.  Increased to 15 mg daily on 11/9.  RD noting weight stabilized, intake improved.    #Suspected HAP, LLL  Reported onset of symptoms over the weekend with \"very\" sore throat, cough, headache, nasal congestion.  VSS, temp mildly elevated at 99.5F, mildly tachycardic. Oxygen sats mid 90s on room air.  Labs 11/15: slightly elevated WBC (11.6), CRP mildly elevated at 19 (prior 12), procal 0.09.  COVID (-) on 11/14.  Influenza (-) 11/15.  CXR revealed dense retrocardiac opacities, suspicious for infection.  Started on IV Zosyn and given IV fluids x3d.  Gradually improving symptoms.  WBC normalized 11/16.  CRP peaked to 78 on 11/16, downtrending since.  Procal <0.05 as of 11/17.  Blood culture NGTD, respiratory panel (-), repeat COVID PCR 11/17 negative.  Sputum culture with 4+ normal johana.  Remains afebrile, saturating well on room air. Completed course of zosyn 11/21  - Continue comfort measures with PRN Tylenol, throat lozenge/spray, Afrin nasal spray, Tessalon (scheduled TID 11/18)      1. Adjustment to disability:  Psych consulted completed, Remeron trial started 11/5, dose increased 11/9, continue to monitor.  2. FEN: regular diet, thin liquids  3. Bowel: continent, constipation as above, continue to monitor  4. Bladder: continent, PVRs at admission x3 <100, ok to monitor PRN only.    5. DVT Prophylaxis: mechanical  6. GI Prophylaxis: PPI given DAPT + age  7. Code: full, confirmed on admission  8. Disposition: goal for home.   9. ELOS: 3 weeks, planning for 11/24  10. Follow up Appointments on Discharge: PCP, stroke and general neurology      Patient discussed with Dr. Fredis Ordonez, PM&R staff physician       Peyton Bonds PA-C  Physical Medicine & Rehabilitation  "

## 2021-11-22 NOTE — PLAN OF CARE
Discharge Planner Post-Acute Rehab OT:      Discharge Plan: home with wife; Home OT recommended. Family to support IADLs.      Precautions: falls     Current Status:  ADLs:   G/H - standing/sitting EOS with Uwalker/4WW SBA with set up, CGA standing d/t fatigue  U/b - seated SBA , A to fasten buttons  L/B dressing - seated in chair doff/ don sba   And sba  Standing to don over hips. Pt used reacher to assist with doff due to rib pain from coughing from pnumonia   Feet: sitting in chair in room, pt able to cross leg over other and doff/don with no effort. Don/doff shoes on floor with long shoe horn. Variable assistance w/ LB dressing, pt requires min A occasionally with and w/o use of AE.   Shower: sba min cues for sequencing slower processing of shower task pt able to wash all areas  inclusing crossing leg over other to gt lowerr leg and feet and standing with cga  with rail for pericares from front and back.   Tub/shower transfer: SBA-CGA w/ safety cues. Pt motivated to get home. Pt had improved performance w/ walk in shower onto shower chair. Will need grab bars for balance safety.      IADLs: Will need assist from family at this time.      Vision/Cognition: slums increased to 27/30  Will continue functional cog tasks     Assessment:. Pt agreeable to ambulation and standing to build endurance and strength with ADL and IADL.  Pt up in chair and fully dressed. Pt complete transitional movements w/ 4ww in his room from short distance ambulation to room and then back to bed and out of bed to chair. Pt was SBA, required problem solving during transitions and assistance to increase I and safety w/ 4ww in tight space. Pt admits his balance isn't great and that he would benefit from longer rehabilitation. Nursing reports that pt has been unsteady on his feet this AM.   Pt could go home but would need closer supervision or daily check ins from family and IADL support due to unsafe decision making and LOB this AM w/ PT.      PM session pt was mainly SBA w/ ambulation to and from gym<->room but would recommend safety w/ walk in shower/shower chair tsfers as pt needs safety cue.     Other Barriers to Discharge (DME, Family Training, etc): daughter in law PT pt has rolling walker and higher toilets at home.   Pt reports he uses walk in shower w/ lip at home but would need a formal shower chair. Pt is currently using a makeshift shower chair.   11.22.21: Called son Corona. They plan to assist w/ higher lever IADLs but will not be able to stay with patient more than a few days. Wife just came from the hospital and can only care for her basic ADLs. Pt would like to get closer to [place] to be near his home if going to TCU but by afternoon session pt is desiring to go home.   Will discuss further with primary PT/team tomorrow. Per PT, and also confirmed w/ OT during tx sessions, pt has made unsafe decision occasionally putting him at high falls risk. Son will be expecting a call for an update or more information. Family training will be needed if discharge home.

## 2021-11-22 NOTE — PLAN OF CARE
FOCUS/GOAL  Bladder management, Skin integrity, and Reinforcement of self-care/ADL    ASSESSMENT, INTERVENTIONS AND CONTINUING PLAN FOR GOAL:  Pt A/O x 4, assist of 1 with walker and gait belt.  Pt continent of bladder this shift using the urinal, LBM 11/21 on AM shift.  Pt has R PIV that is patent, skin is otherwise intact with no concerns noted.  Encouraged pt to be independent in shirt change, but needed some assistance with removing his shirt.  Pt denies pain this shift.

## 2021-11-22 NOTE — PLAN OF CARE
FOCUS/GOAL  Bladder management and Medical management    ASSESSMENT, INTERVENTIONS AND CONTINUING PLAN FOR GOAL:  Pt slept well throughout night shift, was awake briefly during IV antibiotic medication pass.  R PIV is patent.  Pt continent of bladder this shift, using urinal.

## 2021-11-23 ENCOUNTER — APPOINTMENT (OUTPATIENT)
Dept: PHYSICAL THERAPY | Facility: CLINIC | Age: 76
DRG: 056 | End: 2021-11-23
Attending: PHYSICAL MEDICINE & REHABILITATION
Payer: MEDICARE

## 2021-11-23 ENCOUNTER — APPOINTMENT (OUTPATIENT)
Dept: OCCUPATIONAL THERAPY | Facility: CLINIC | Age: 76
DRG: 056 | End: 2021-11-23
Attending: PHYSICAL MEDICINE & REHABILITATION
Payer: MEDICARE

## 2021-11-23 LAB
GLUCOSE BLDC GLUCOMTR-MCNC: 129 MG/DL (ref 70–99)
GLUCOSE BLDC GLUCOMTR-MCNC: 143 MG/DL (ref 70–99)

## 2021-11-23 PROCEDURE — 128N000003 HC R&B REHAB

## 2021-11-23 PROCEDURE — 97750 PHYSICAL PERFORMANCE TEST: CPT | Mod: GP | Performed by: PHYSICAL THERAPIST

## 2021-11-23 PROCEDURE — 97530 THERAPEUTIC ACTIVITIES: CPT | Mod: GP | Performed by: PHYSICAL THERAPIST

## 2021-11-23 PROCEDURE — 97535 SELF CARE MNGMENT TRAINING: CPT | Mod: GO

## 2021-11-23 PROCEDURE — 250N000013 HC RX MED GY IP 250 OP 250 PS 637: Performed by: PHYSICIAN ASSISTANT

## 2021-11-23 PROCEDURE — 99233 SBSQ HOSP IP/OBS HIGH 50: CPT | Performed by: PHYSICAL MEDICINE & REHABILITATION

## 2021-11-23 PROCEDURE — 97116 GAIT TRAINING THERAPY: CPT | Mod: GP | Performed by: PHYSICAL THERAPIST

## 2021-11-23 RX ORDER — ENALAPRIL MALEATE 5 MG/1
5 TABLET ORAL ONCE
Status: COMPLETED | OUTPATIENT
Start: 2021-11-23 | End: 2021-11-23

## 2021-11-23 RX ORDER — PANTOPRAZOLE SODIUM 40 MG/1
40 TABLET, DELAYED RELEASE ORAL
Qty: 30 TABLET | Refills: 0 | Status: SHIPPED | OUTPATIENT
Start: 2021-11-24

## 2021-11-23 RX ORDER — AMOXICILLIN 250 MG
1-2 CAPSULE ORAL 2 TIMES DAILY PRN
COMMUNITY
Start: 2021-11-23

## 2021-11-23 RX ORDER — BLOOD PRESSURE TEST KIT
KIT MISCELLANEOUS
Qty: 100 EACH | Refills: 0 | Status: SHIPPED | OUTPATIENT
Start: 2021-11-23

## 2021-11-23 RX ORDER — CLOPIDOGREL BISULFATE 75 MG/1
TABLET ORAL
Qty: 30 TABLET | Refills: 0 | Status: SHIPPED | OUTPATIENT
Start: 2021-11-23

## 2021-11-23 RX ORDER — ENALAPRIL MALEATE 5 MG/1
20 TABLET ORAL 2 TIMES DAILY
Status: DISCONTINUED | OUTPATIENT
Start: 2021-11-23 | End: 2021-11-24 | Stop reason: HOSPADM

## 2021-11-23 RX ORDER — MIRTAZAPINE 15 MG/1
15 TABLET, FILM COATED ORAL AT BEDTIME
Qty: 30 TABLET | Refills: 0 | Status: SHIPPED | OUTPATIENT
Start: 2021-11-23

## 2021-11-23 RX ORDER — ASPIRIN 325 MG
325 TABLET ORAL DAILY
Qty: 30 TABLET | Refills: 0 | Status: SHIPPED | OUTPATIENT
Start: 2021-11-24

## 2021-11-23 RX ORDER — ENALAPRIL MALEATE 20 MG/1
20 TABLET ORAL 2 TIMES DAILY
Qty: 60 TABLET | Refills: 0 | Status: SHIPPED | OUTPATIENT
Start: 2021-11-23

## 2021-11-23 RX ORDER — CONTAINER,EMPTY
EACH MISCELLANEOUS
Qty: 1 EACH | Refills: 0 | Status: SHIPPED | OUTPATIENT
Start: 2021-11-23

## 2021-11-23 RX ADMIN — CLOPIDOGREL BISULFATE 75 MG: 75 TABLET, FILM COATED ORAL at 09:40

## 2021-11-23 RX ADMIN — METFORMIN HYDROCHLORIDE 1000 MG: 500 TABLET ORAL at 17:30

## 2021-11-23 RX ADMIN — POTASSIUM CHLORIDE 20 MEQ: 750 TABLET, EXTENDED RELEASE ORAL at 09:40

## 2021-11-23 RX ADMIN — ASPIRIN 325 MG ORAL TABLET 325 MG: 325 PILL ORAL at 09:40

## 2021-11-23 RX ADMIN — METFORMIN HYDROCHLORIDE 1000 MG: 500 TABLET ORAL at 09:40

## 2021-11-23 RX ADMIN — BENZONATATE 100 MG: 100 CAPSULE ORAL at 09:40

## 2021-11-23 RX ADMIN — ENALAPRIL MALEATE 15 MG: 5 TABLET ORAL at 06:45

## 2021-11-23 RX ADMIN — PANTOPRAZOLE SODIUM 40 MG: 40 TABLET, DELAYED RELEASE ORAL at 09:40

## 2021-11-23 RX ADMIN — SENNOSIDES AND DOCUSATE SODIUM 1 TABLET: 8.6; 5 TABLET ORAL at 20:18

## 2021-11-23 RX ADMIN — ATORVASTATIN CALCIUM 40 MG: 40 TABLET, FILM COATED ORAL at 20:18

## 2021-11-23 RX ADMIN — ENALAPRIL MALEATE 5 MG: 5 TABLET ORAL at 16:00

## 2021-11-23 RX ADMIN — ENALAPRIL MALEATE 20 MG: 5 TABLET ORAL at 20:18

## 2021-11-23 RX ADMIN — MIRTAZAPINE 15 MG: 7.5 TABLET, FILM COATED ORAL at 20:18

## 2021-11-23 ASSESSMENT — MIFFLIN-ST. JEOR: SCORE: 1597.61

## 2021-11-23 ASSESSMENT — ACTIVITIES OF DAILY LIVING (ADL)
ADLS_ACUITY_SCORE: 15
ADLS_ACUITY_SCORE: 15
ADLS_ACUITY_SCORE: 13
ADLS_ACUITY_SCORE: 15
ADLS_ACUITY_SCORE: 13
ADLS_ACUITY_SCORE: 15
ADLS_ACUITY_SCORE: 13
ADLS_ACUITY_SCORE: 15
ADLS_ACUITY_SCORE: 13
ADLS_ACUITY_SCORE: 15

## 2021-11-23 NOTE — PLAN OF CARE
FOCUS/GOAL  Bowel management, Bladder management, Pain management, Mobility, Skin integrity, and Safety management    ASSESSMENT, INTERVENTIONS AND CONTINUING PLAN FOR GOAL:  A/O, VS stable. Regular/thin, pills whole. Ate most of meal, , insulin discontinued. BG checks changed to BID. No complaints of pain this shift. Assist of 1 with walker to transfer. No new skin concerns, IV in left arm patent. Calling appropriately with light. Continue POC.  Pt has laundry in the therapy gym that needs to be completed.

## 2021-11-23 NOTE — PROGRESS NOTES
SPIRITUAL HEALTH SERVICES  SPIRITUAL ASSESSMENT Progress Note  [facility] ([facility]) ARU R 508 11/23      REFERRAL SOURCE: Follow Up Visit (by phone)      Pt mentioned he is doing well and discharges tomorrow. Pt seemed very optimistic about life, God's purposes and plans. Pt is ready to be with family again and local [employer]. Pt believes he has other things that he will do that benefit mankind. He know his limitations and how life will be different now. He stated he has a lot to be thankful for on this Thanksgiving.    PLAN: No plan at this time. If pt  Returns to unit  will follow up with him then    Nancy Tolentino  Associate    Pager: [phone]

## 2021-11-23 NOTE — PLAN OF CARE
"Discharge Planner Post-Acute Rehab PT:      Discharge Plan: Home with home care PT 11/24    Precautions: Fall precaution     Current Status:  Bed Mobility: Ind  Transfer: Mod I w/ UE support on walker or stable environment  Gait: AMb >250 with 4ww, intermittent freezing/festinating but able to correct ind  Stairs:12- 6\" steps with 2 hands on single rail, step to pattern, SBA  Balance:   PASS:  31/36  Haas (11/23): 36/56  Gait Speed: .48 m/s,  this is a pathological gait speed     Assessment: PASS significantly improved over IP stay. Haas back at improved level from prior to his episode of pneumonia. Mod I in room w/ 4ww. Discussed not doing stairs at home unless family member (not his wife) with him, no driving which was disappointing to him.    Other Barriers to Discharge (DME, Family Training, etc):   Mobility: 4ww, indicated he had one but this was a loaner not actually his. Prescription sent to [facility] and number given to him so his son can call and pay for it. Anticipate delivery Wednesday am.   "

## 2021-11-23 NOTE — PLAN OF CARE
FOCUS/GOAL  Bowel management, Bladder management, Pain management, Skin integrity and Cognition/Memory/Judgment/Problem solving    ASSESSMENT, INTERVENTIONS AND CONTINUING PLAN FOR GOAL:  Pt is alert and oriented x4, denies fever, chills, chest pain, SOB, N/V, abdominal pain, or new weakness/numbness/tingling, continent of bowel and bladder, transferring with assist of 1 and ww, sleeping well throughout the night,PIV in LUE, HTN in am morning dose of VASOTEC administered early per providers request, oncoming RN aware,  no further care concerns at this time continue with POC.

## 2021-11-23 NOTE — PROGRESS NOTES
11/23/21 0835   Signing Clinician's Name / Credentials   Signing clinician's name / credentials Anne-Marie Siddiqui, PT   Pham Balance Scale (PHAM K, GIL S, IRIS SALINAS, MACEY JORGENSEN: MEASURING BALANCE IN THE ELDERLY: VALIDATION OF AN INSTRUMENT. CAN. J. PUB. HEALTH, JULY/AUGUST SUPPLEMENT 2:S7-11, 1992.)   Sit To Stand 3   Standing Unsupported 4   Sitting Unsupported 4   Stand to Sit 3   Transfers 3   Standing with Eyes Closed 4   Standing Unsupported, Feet Together 1   Reach Forward With Outstretched Arm 2   Retrieve Object From Floor 3   Turning to Look Behind 3   Turn 360 Degrees 1   Placing Alternate Foot on Stool (4-6 inches) 2   Unsupported Tandem Stand (Demonstrate to Subject) 2   One Leg Stand 1   Total Score (A score of 45 or less has been correlated with an increased risk of falls)   Total Score (out of 56) 36     Pham Balance Scale (BBS) Cutoff Scores for CVA Population:  Patient Score: 36/56    The BBS is a measure of static and dynamic standing balance that has been validated in community dwelling elderly individuals and individuals who have Parkinson's Disease, MS, and those who are s/p CVA and TBI. The test is administered without an assistive device. Scores from the Pham are used to determine the probability of falling based on the patient's previous history of falls and their test performance.     0-20 High risk for falling- Corresponded with w/c bound status  21-40 Medium risk for falling- Able to walk with assistance  41-56 Low risk for falling- Able to walk independently  According to [facility].  Available at http://www.strokecenter.org/.  Accessibility verified April 10, 2013.  Minimal Detectable Change = 6.5 according to Jose A & Yarieley 2008    Assessment (rationale for performing, application to patient s function & care plan): Patient has returned to previous acme of balance performance/skills. He is still at risk for falling but demonstrates safe use of walker for  mobility.Recommend Mod I in room and discharge home as planned tomorrow.   Minutes billed as physical performance test: 15

## 2021-11-23 NOTE — PLAN OF CARE
Vasotec 15mg was given early this AM for /94. Rechecked BP at 0945: 169/91. Paged Dr Ordonez for followup.  Mitzi Bonds placed orders to increase Vasotec.  Will continue to monitor BP.  Pt should check BP daily upon discharge.  Pt would like script sent to his local pharmacy for BP cuff as he does not have one at home.  PIV d/c'd.

## 2021-11-23 NOTE — PLAN OF CARE
Discharge Planner Post-Acute Rehab OT:      Discharge Plan: home with wife; Home OT recommended. Family to support IADLs.      Precautions: falls     Current Status:  ADLs:   G/H - standing/sitting EOS with Uwalker/4WW SBA with set up, CGA standing d/t fatigue  U/b - seated SBA , A to fasten buttons  L/B dressing - seated in chair doff/ don sba   And sba  Standing to don over hips. Pt used reacher to assist with doff due to rib pain from coughing from pnumonia   Feet: sitting in chair in room, pt able to cross leg over other and doff/don with no effort. Don/doff shoes on floor with long shoe horn. Variable assistance w/ LB dressing, pt requires min A occasionally with and w/o use of AE.   Shower: sba min cues for sequencing slower processing of shower task pt able to wash all areas  inclusing crossing leg over other to gt lowerr leg and feet and standing with cga  with rail for pericares from front and back.   Tub/shower transfer: SBA-CGA w/ safety cues. Pt motivated to get home. Pt had improved performance w/ walk in shower onto shower chair. Will need grab bars for balance safety.   Toilet tsfer w/ use of grab bar for safety with SBA and safety cues.      IADLs: Will need assist w/ meal prep, medication assistance, oversight w/ finances, and heavier IADLs. Pt will need transportation assistance until further assessment for driving.      Vision/Cognition: slums increased to 27/30  Will continue functional cog tasks     Assessment:. Pt completed shower and tub tsfer w/ safety cues 50% of the time for management of 4ww during transitional movement.  Pt was fatigued and requested seated g/h tasks as sink following ambulation SBA 4ww room<->shower. Pt had a LOB up sitting dynamically at EOB w/ attempt to don socks, declined sock aide. Pt diverted to donning shoes for ease to get to shower.     Discussion w/ team if pt can go home and family can provide more closer supervision or if pt can benefit from further skilled  OT prior to discharge home. Family is also assisting wife who just came home from hospital recently.     PM discussion: Pt will go home as balance deficits may be new baseline per PA. Family training will be needed tomorrow. Pt will need assistance with medication set up. Home care OT continued.     Other Barriers to Discharge (DME, Family Training, etc): daughter in law PT pt has rolling walker and higher toilets at home.   Pt reports he uses walk in shower w/ lip at home but recommend a formal shower chair. Pt is currently using a makeshift shower chair.   Recommend for patient to have SBA w/ shower tsfer in and out of shower and assistance w/ meal prep/medications due to errors.   Pt would need grab bars in shower and by toilet for balance safety, pt was educated.   Family education TBD.

## 2021-11-23 NOTE — DISCHARGE SUMMARY
"    [facility]   Acute Rehabilitation Unit  Discharge summary     Date of Admission: 10/29/2021  Date of Discharge: 11/24/21  Disposition: home  Primary Care Physician: Pedro Srivastava  Attending physician: Fredis Ordonez MD    DISCHARGE DIAGNOSIS  Subacute pontine stroke (l), chronic (right)  Gait instability  Arachnoid cyst  Peripheral neuropathy  HTN  DM type 2  Adjustment disorder with depression and anxiety  Recent healthcare associated pneumonia      BRIEF SUMMARY  Jose Mallory is a 76 year old right hand dominant male with a past medical history of DM2, HTN, HLD, GERD, posterior fossa arachnoid cyst, peripheral neuropathy, prior R pontine stroke, gait instability, and bilateral knee replacement who was admitted on 10/26/21 with progressive lower extremity weakness and gait difficulties, found to have subacute left pontine stroke with course complicated by neuropathy.  He is now admitted to ARU on 10/29 for multidisciplinary rehabilitation and ongoing medical management.       REHABILITATION COURSE  Discharge Planner Post-Acute Rehab PT:      Discharge Plan: Home with home care PT 11/24    Current Status:  Bed Mobility: Ind  Transfer: Mod I w/ UE support on walker or stable environment  Gait: AMb >250 with 4ww, intermittent freezing/festinating but able to correct ind  Stairs:12- 6\" steps with 2 hands on single rail, step to pattern, SBA  Balance:   PASS:  31/36  Haas (11/23): 36/56  Gait Speed: .48 m/s,  this is a pathological gait speed     Assessment: PASS significantly improved over IP stay. Haas back at improved level from prior to his episode of pneumonia. Mod I in room w/ 4ww. Discussed not doing stairs at home unless family member (not his wife) with him, no driving which was disappointing to him.    Other Barriers to Discharge (DME, Family Training, etc):   Mobility: 4ww, indicated he had one but this was a loaner not actually his. Prescription sent to " [facility] and number given to him so his son can call and pay for it. Anticipate delivery Wednesday am.     Occupational therapy:    Current Status:  ADLs:   G/H - standing/sitting EOS with Uwalker/4WW SBA with set up, CGA standing d/t fatigue  U/b - seated SBA , A to fasten buttons  L/B dressing - seated in chair doff/ don sba   And sba  Standing to don over hips. Pt used reacher to assist with doff due to rib pain from coughing from pnumonia   Feet: sitting in chair in room, pt able to cross leg over other and doff/don with no effort. Don/doff shoes on floor with long shoe horn. Variable assistance w/ LB dressing, pt requires min A occasionally with and w/o use of AE.   Shower: sba min cues for sequencing slower processing of shower task pt able to wash all areas  inclusing crossing leg over other to gt lowerr leg and feet and standing with cga  with rail for pericares from front and back.   Tub/shower transfer: SBA-CGA w/ safety cues. Pt motivated to get home. Pt had improved performance w/ walk in shower onto shower chair. Will need grab bars for balance safety.   Toilet tsfer w/ use of grab bar for safety with SBA and safety cues.      IADLs: Will need assist w/ meal prep, medication assistance, oversight w/ finances, and heavier IADLs. Pt will need transportation assistance until further assessment.      Vision/Cognition: slums increased to 27/30  Will continue functional cog tasks    MEDICAL COURSE    #Subacute L pontine stroke  #Subacute to chronic R pontine stroke  #Gait instability  #Arachnoid cyst  #Moderate cervical canal stenosis with disc herniation most pronounced at C3-C4, C4-C5   MRI with subacute cerebral infarction in left ventral cookie.  TTE EF 60-65%, no significant valvular disease or cardiac source for embolus.  Telemetry with NSR and no evidence of atrial fibrillation.  IV tPA was not initiated due to unclear or unfavorable risk-benefit profile for extended window thrombolysis beyond the  conventional 4.5 hour time window.  Etiology of stroke thought to be atherosclerotic. Gait instability suspected to be multifactorial including peripheral neuropathy, cerebellar disease, and recent bilateral pontine strokes.  - Continue DAPT with Aspirin 325 mg daily and Plavix 75 mg daily x90 days, then stop Plavix and continue Aspirin 325 mg daily only  - High intensity statin (atorvastatin 40 mg daily) to target LDL <70  - HbA1c at goal (6.3%)  - Long-term BP goal to 120/80  - PT noting festinating gait, very slow gait, intermittent freezing, cogwheeling, difficulty initiating motor tasks.  Encouraged f/u neuro  - Continue PT/OT- with home care upon discharge.   - Outpatient sleep evaluation for SELIN- primary care to make referral.   - Follow up with stroke neurology in 4-6 weeks  -follow up neurosurgery  for arachnoid cyst.     #Peripheral neuropathy  #Decreased vibration sensation bilaterally in lower extremities  Per neurology, suspect that large fiber sensory dysfunction secondary to diabetes.  Vitamin B12/folate WNL, NEHA negative, HIV non reactive, RPR negative.  Copper, zinc WNL.  Protein electrophoresis with slightly elevated alpha 2, per pathologist, essentially normal, no obvious monoclonal proteins.  Homocysteine WNL, MMA WNL.  - Follow up with neurology for EMG     #HTN  [PTA enalapril 5mg PO daily]  Enalapril increased to 10 mg daily on 11/2 due to generally elevated BP, increased again to 20 mg daily on 11/13.  - continue enalapril to 20 mg bid.    -follow up primary care.     #DMII  [PTA meds: metformin 1000 mg BID, glipizide 10 mg daily]  A1c 6.3%.  Patient reports compliance with medications, but that he was not taking his blood glucose at home as directed.  Glucose 107-170  - Continue PTA metformin 1g BID.   -check glucose bid.   -follow up primary care.      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  He notes decreased interest in food over the past year or so.  He has been sleeping poorly this admission.  Struggling with being away from family as well.  - Psych consult completed 11/5, appreciate assistance  - continue remeron started during rehab stay.      #Suspected HAP, LLL  Reported onset of symptoms over the weekend with \"very\" sore throat, cough, headache, nasal congestion.  VSS, temp mildly elevated at 99.5F, mildly tachycardic. Oxygen sats mid 90s on room air.  Labs 11/15: slightly elevated WBC (11.6), CRP mildly elevated at 19 (prior 12), procal 0.09.  COVID (-) on 11/14.  Influenza (-) 11/15.  CXR revealed dense retrocardiac opacities, suspicious for infection.  Started on IV Zosyn and given IV fluids x3d.  Gradually improving symptoms.  WBC normalized 11/16.  CRP peaked to 78 on 11/16, downtrending since.  Procal <0.05 as of 11/17.  Blood culture NGTD, respiratory panel (-), repeat COVID PCR 11/17 negative.  Sputum culture with 4+ normal johana.  Remains afebrile, saturating well on room air. Completed course of zosyn 11/21  DISCHARGE MEDICATIONS  Current Discharge Medication List      START taking these medications    Details   Alcohol Swabs PADS Use to swab the area of the injection or judy as directed Per insurance coverage  Qty: 100 each, Refills: 0    Associated Diagnoses: Type 2 diabetes mellitus with hyperglycemia, without long-term current use of insulin (H)      aspirin (ASA) 325 MG tablet Take 1 tablet (325 mg) by mouth daily Aspirin 325 mg daily and Plavix 75 mg daily x90 days, then stop Plavix and continue Aspirin 325 mg daily only  Qty: 30 tablet, Refills: 0    Associated Diagnoses: Left pontine stroke (H)      blood glucose (NO BRAND SPECIFIED) lancets standard To use to test glucose level in the blood Use to test blood " sugar  2  times daily as directed. To accompany glucose monitor brands per insurance coverage.  Qty: 100 each, Refills: 0    Associated Diagnoses: Type 2 diabetes mellitus with hyperglycemia, without long-term current use of insulin (H)      blood glucose (NO BRAND SPECIFIED) test strip To use to test glucose level in the blood Use to test blood sugar 2 times daily as directed. To accompany glucose monitor brands per insurance coverage.  Qty: 100 strip, Refills: 0    Associated Diagnoses: Type 2 diabetes mellitus with hyperglycemia, without long-term current use of insulin (H)      blood glucose monitoring (NO BRAND SPECIFIED) meter device kit Use to test blood glucose twice daily as directed  Qty: 1 kit, Refills: 0    Associated Diagnoses: Type 2 diabetes mellitus with hyperglycemia, without long-term current use of insulin (H)      mirtazapine (REMERON) 15 MG tablet Take 1 tablet (15 mg) by mouth At Bedtime  Qty: 30 tablet, Refills: 0    Associated Diagnoses: Situational depression      pantoprazole (PROTONIX) 40 MG EC tablet Take 1 tablet (40 mg) by mouth every morning (before breakfast)  Qty: 30 tablet, Refills: 0    Associated Diagnoses: Preventative health care      senna-docusate (SENOKOT-S/PERICOLACE) 8.6-50 MG tablet Take 1-2 tablets by mouth 2 times daily as needed for constipation    Associated Diagnoses: Constipation, unspecified constipation type      Sharps Container MISC Use as directed to dispose of needles, lancets and other sharps Per Insurance coverage  Qty: 1 each, Refills: 0    Associated Diagnoses: Type 2 diabetes mellitus with hyperglycemia, without long-term current use of insulin (H)         CONTINUE these medications which have CHANGED    Details   clopidogrel (PLAVIX) 75 MG tablet take Plavix 75 mg daily x90 days (from stroke) then stop.  Qty: 30 tablet, Refills: 0    Associated Diagnoses: Cerebrovascular accident (CVA), unspecified mechanism (H)      enalapril (VASOTEC) 20 MG tablet Take  1 tablet (20 mg) by mouth 2 times daily  Qty: 60 tablet, Refills: 0    Associated Diagnoses: Benign essential hypertension         CONTINUE these medications which have NOT CHANGED    Details   atorvastatin (LIPITOR) 40 MG tablet Take 1 tablet (40 mg) by mouth every evening  Qty: 90 tablet, Refills: 0    Associated Diagnoses: Cerebrovascular accident (CVA), unspecified mechanism (H)      metFORMIN (GLUCOPHAGE) 1000 MG tablet Take 1,000 mg by mouth 2 times daily (with meals)         STOP taking these medications       aspirin (ASA) 81 MG chewable tablet Comments:   Reason for Stopping:         glipiZIDE (GLUCOTROL XL) 10 MG 24 hr tablet Comments:   Reason for Stopping:                 DISCHARGE INSTRUCTIONS AND FOLLOW UP  Discharge Procedure Orders   Home care nursing referral   Referral Priority: Routine Referral Type: Home Health Therapies & Aides   Number of Visits Requested: 1     Home Care PT Referral for Hospital Discharge   Referral Priority: Routine Referral Type: Home Health Therapies & Aides   Number of Visits Requested: 1     Home Care OT Referral for Hospital Discharge   Referral Priority: Routine Referral Type: Consultation   Number of Visits Requested: 1     Home Care Social Service Referral for Hospital Discharge   Referral Priority: Routine Referral Type: Consultation   Number of Visits Requested: 1     Reason for your hospital stay   Order Comments: Admitted for rehabilitation following stroke.     Activity   Order Comments: Your activity upon discharge: activity as tolerated up with walker- recommended at least initial oversight with mobility for safety, continue therapy with home care. Follow up stroke neurology and general neurology- given noted: festinating gait, very slow gait, intermittent freezing, cogwheeling, difficulty initiating motor tasks.  Plan to follow-up with neurology     Order Specific Question Answer Comments   Is discharge order? Yes      Adult [facility] Follow-up and recommended  labs and tests   Order Comments: Follow up primary care follow up hospitalization within one week  Follow up stroke neurology follow up stroke  Follow up general neurology- festinating gait, very slow gait, intermittent freezing, cogwheeling, difficulty initiating motor tasks.      Appointments on [facility] and/or [facility] (with [facility] or [facility] provider or service). Call [phone] if you haven't heard regarding these appointments within 7 days of discharge.     Monitor   Order Comments: Check blood sugar twice daily  Check blood pressure daily.     MD face to face encounter   Order Comments: Documentation of Face to Face and Certification for Home Health Services    I certify that patient: Jose Mallory is under my care and that I, or a nurse practitioner or physician's assistant working with me, had a face-to-face encounter that meets the physician face-to-face encounter requirements with this patient on: 11/23/2021.    This encounter with the patient was in whole, or in part, for the following medical condition, which is the primary reason for home health care: impaired balance, impaired strength,     I certify that, based on my findings, the following services are medically necessary home health services: Nursing, Occupational Therapy, and Physical Therapy. Social Work.     My clinical findings support the need for the above services because: Nurse is needed: To assess home safety, medication management after changes in medications or other medical regimen.., Occupational Therapy Services are needed to assess and treat cognitive ability and address ADL safety due to impairment in functional mobility., and Physical Therapy Services are needed to assess and treat the following functional impairments: strength/activity tolerance.    Further, I certify that my clinical findings support that this patient is homebound (i.e. absences from home require considerable and taxing effort and are for medical reasons  or [employer] services or infrequently or of short duration when for other reasons) because: Requires assistance of another person or specialized equipment to access medical services because patient: Is unable to operate assistive equipment on their own...    Based on the above findings. I certify that this patient is confined to the home and needs intermittent skilled nursing care, physical therapy and/or speech therapy.  The patient is under my care, and I have initiated the establishment of the plan of care.  This patient will be followed by a physician who will periodically review the plan of care.  Physician/Provider to provide follow up care: Pedro Srivastava    Attending hospital physician (the Medicare certified [facility] provider): Fredis Ordonez MD  Physician Signature: See electronic signature associated with these discharge orders.  Date: 11/23/2021     Full Code     Order Specific Question Answer Comments   Code status determined by: Discussion with patient/ legal decision maker      Social Work IP Consult     Diet   Order Comments: Follow this diet upon discharge:       Combination Diet Regular Diet Adult; Thin Liquids (level 0)     Order Specific Question Answer Comments   Is discharge order? Yes           PHYSICAL EXAMINATION    Most recent Vital Signs:   Vitals:    11/22/21 2007 11/23/21 0620 11/23/21 0943 11/23/21 1555   BP: (!) 141/81 (!) 171/97 (!) 169/91 (!) 148/83   BP Location:  Left arm Right arm Left arm   Patient Position:   Semi-Greco's    Pulse:  82 77 87   Resp:  20 18 16   Temp:  97.9  F (36.6  C)  98.6  F (37  C)   TempSrc:  Oral  Oral   SpO2:  95%  95%   Weight:  87.1 kg (192 lb 1.6 oz)     Height:       General: awake alert nad  REsp: non labored on room air  Extremities: without edema  MSK: speech clear moves all extremities against gravity.       40 minutes spent in discharge, including >50% in counseling and coordination of care, medication review and plan of care recommended  on follow up.     Patient was evaluated on day of discharge by attending physician, Fredis Ordonez MD, who agrees with plan of care.    Discharge summary was forwarded to Pedro Srivastava (PCP) at the time of discharge, so as to bridge from hospital to outpatient care.     It was our pleasure to care for Jose Mallory during this hospitalization. Please do not hesitate to contact me should there be questions regarding the hospital course or discharge plan.          Peyton Bonds PA-C  Physical Medicine and Rehabilitation

## 2021-11-23 NOTE — PROGRESS NOTES
[facility]   Acute Rehabilitation Unit  Daily progress note    INTERVAL HISTORY  Jose was seen sitting up in bed.  Reports he is planning for discharge tomorrow, discussed intermittent balance impairment and recommendations to complete family training prior to discharge.  Jose is in agreement with this, reviewed medications for discharge he will fill all new medications here and leave with new glucometer.  Discussed in setting of recent stroke, holding glipizide, and changes to activity level and intake he should check glucose 2 times daily at least until follow up with primary care.  He should also check blood pressure daily.  Patient requested new glucometer for discharge.  We also discussed recommended follow up: primary care, neurology to follow up stroke, and neurology to follow up ongoing impaired balance, rigidity, impaired motor planning.  He verbalizes understanding of this plan.      OT: Pt completed shower and tub tsfer w/ safety cues 50% of the time for management of 4ww during transitional movement.  Pt was fatigued and requested seated g/h tasks as sink following ambulation SBA 4ww room<->shower. Pt had a LOB up sitting dynamically at EOB w/ attempt to don socks, declined sock aide. Pt diverted to donning shoes for ease to get to shower.     MEDICATIONS    aspirin  325 mg Oral Daily     atorvastatin  40 mg Oral QPM     clopidogrel  75 mg Oral Daily     enalapril  20 mg Oral BID     enalapril  5 mg Oral Once     influenza vac high-dose quad  0.7 mL Intramuscular Prior to discharge     metFORMIN  1,000 mg Oral BID w/meals     mirtazapine  15 mg Oral At Bedtime     pantoprazole  40 mg Oral QAM AC     polyethylene glycol  17 g Oral Daily     potassium chloride  20 mEq Oral Daily     senna-docusate  1 tablet Oral BID     sodium chloride (PF)  3 mL Intracatheter Q8H        acetaminophen, sore throat lozenge, bisacodyl, glucose **OR** dextrose **OR** glucagon, melatonin,  "oxymetazoline, phenol, senna-docusate, sodium chloride (PF)     PHYSICAL EXAM  BP (!) 169/91 (BP Location: Right arm, Patient Position: Semi-Greco's)   Pulse 77   Temp 97.9  F (36.6  C) (Oral)   Resp 18   Ht 1.778 m (5' 10\")   Wt 87.1 kg (192 lb 1.6 oz)   SpO2 95%   BMI 27.56 kg/m     Gen: NAD  HEENT: NC/AT  CV: regular with suspected PAC- as seen on EKG  Pulm: non-labored clear on room air  Abd:  Soft non-distended  Ext: no edema in bilateral lower extremities  Neuro/MSK: AAOx4, follows commands, mild facial droop on L up with sba-cga with ambulation.    LABS  CBC RESULTS:   Recent Labs   Lab Test 11/22/21  0559 11/18/21  0817 11/17/21  0645   WBC 6.7 7.6 7.6   RBC 4.12* 4.21* 4.35*   HGB 12.4* 13.0* 13.3   HCT 36.7* 37.9* 39.4*   MCV 89 90 91   MCH 30.1 30.9 30.6   MCHC 33.8 34.3 33.8   RDW 12.1 11.9 11.9    132* 123*     Last Basic Metabolic Panel:  Recent Labs   Lab Test 11/23/21  1212 11/22/21  2137 11/22/21  1719 11/22/21  0847 11/22/21  0559 11/18/21  1117 11/18/21  0817 11/17/21  1212 11/17/21  0645   NA  --   --   --   --  140  --  140  --  142   POTASSIUM  --   --   --   --  3.2*  --  3.7  --  4.2   CHLORIDE  --   --   --   --  107  --  110*  --  112*   CO2  --   --   --   --  29  --  23  --  29   ANIONGAP  --   --   --   --  4  --  7  --  1*   * 88 164*   < > 99   < > 125*   < > 123*   BUN  --   --   --   --  7  --  12  --  11   CR  --   --   --   --  0.90  --  0.94  --  1.11   GFRESTIMATED  --   --   --   --  82  --  77  --  63   LEON  --   --   --   --  8.2*  --  8.2*  --  8.6    < > = values in this interval not displayed.       Rehabilitation - continue comprehensive acute inpatient rehabilitation program with multidisciplinary approach including therapies, rehab nursing, and physiatry following. See interval history for updates.      ASSESSMENT AND PLAN  Jose Mallory is a 76 year old right hand dominant male with a past medical history of DM2, HTN, HLD, GERD, posterior fossa " arachnoid cyst, peripheral neuropathy, prior R pontine stroke, gait instability, and bilateral knee replacement who was admitted on 10/26/21 with progressive lower extremity weakness and gait difficulties, found to have subacute left pontine stroke with course complicated by neuropathy.  He is now admitted to ARU on 10/29 for multidisciplinary rehabilitation and ongoing medical management.       #Subacute L pontine stroke  #Subacute to chronic R pontine stroke  #Gait instability  #Arachnoid cyst  #Moderate cervical canal stenosis with disc herniation most pronounced at C3-C4, C4-C5   MRI with subacute cerebral infarction in left ventral cookie.  TTE EF 60-65%, no significant valvular disease or cardiac source for embolus.  Telemetry with NSR and no evidence of atrial fibrillation.  IV tPA was not initiated due to unclear or unfavorable risk-benefit profile for extended window thrombolysis beyond the conventional 4.5 hour time window.  Etiology of stroke thought to be atherosclerotic. Gait instability suspected to be multifactorial including peripheral neuropathy, cerebellar disease, and recent bilateral pontine strokes.  - Continue DAPT with Aspirin 325 mg daily and Plavix 75 mg daily x90 days, then stop Plavix and continue Aspirin 325 mg daily only  - High intensity statin (atorvastatin 40 mg daily) to target LDL <70  - HbA1c at goal (6.3%)  - Long-term BP goal to 120/80  - PT noting festinating gait, very slow gait, intermittent freezing, cogwheeling, difficulty initiating motor tasks.  Plan to follow-up with neurology  - Continue PT/OT  - Outpatient sleep evaluation for SELIN  - Follow up with stroke neurology in 4-6 weeks  -follow up neurosurgery  for arachnoid cyst.     #Peripheral neuropathy  #Decreased vibration sensation bilaterally in lower extremities  Per neurology, suspect that large fiber sensory dysfunction secondary to diabetes.  Vitamin B12/folate WNL, NEHA negative, HIV non reactive, RPR negative.   "Copper, zinc WNL.  Protein electrophoresis with slightly elevated alpha 2, per pathologist, essentially normal, no obvious monoclonal proteins.  Homocysteine WNL, MMA WNL.  - Follow up with neurology for EMG     #HTN  [PTA enalapril 5mg PO daily]  Enalapril increased to 10 mg daily on 11/2 due to generally elevated BP, increased again to 20 mg daily on 11/13.  - continue enalapril to 15 mg BID.  - Continue to monitor BP, adjust meds as indicated     #DMII  [PTA meds: metformin 1000 mg BID, glipizide 10 mg daily]  A1c 6.3%.  Patient reports compliance with medications, but that he was not taking his blood glucose at home as directed.  Glucose 107-170  - Continue PTA metformin 1g BID.   -check glucose bid.     #Hypokalemia  Mild K 3.2  Replace 20 mEQ daily recheck 11/24    #Constipation    Receiving Miralax and Senokot-S.   AXR on 11/5 with no significant colonic stool burden, non-obstructive bowel gas pattern.    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  He notes decreased interest in food over the past year or so.  He has been sleeping poorly this admission.  Struggling with being away from family as well.  - Psych consult completed 11/5, appreciate assistance  - Remeron 7.5 mg started on 11/5 for mood, appetite, sleep.  Patient states that he is sleeping better.  Ongoing issues with depressed mood, but also with significant social stressors due to wife's illness.  Appetite remains impaired.  Increased to 15 mg daily on 11/9.  RD noting weight stabilized, intake improved.    #Suspected HAP, LLL  Reported onset of symptoms over the weekend with \"very\" sore throat, cough, headache, nasal congestion.  VSS, temp mildly elevated at 99.5F, mildly tachycardic. Oxygen sats " mid 90s on room air.  Labs 11/15: slightly elevated WBC (11.6), CRP mildly elevated at 19 (prior 12), procal 0.09.  COVID (-) on 11/14.  Influenza (-) 11/15.  CXR revealed dense retrocardiac opacities, suspicious for infection.  Started on IV Zosyn and given IV fluids x3d.  Gradually improving symptoms.  WBC normalized 11/16.  CRP peaked to 78 on 11/16, downtrending since.  Procal <0.05 as of 11/17.  Blood culture NGTD, respiratory panel (-), repeat COVID PCR 11/17 negative.  Sputum culture with 4+ normal johana.  Remains afebrile, saturating well on room air. Completed course of zosyn 11/21        1. Adjustment to disability:  Psych consulted completed, Remeron trial started 11/5, dose increased 11/9, continue to monitor.  2. FEN: regular diet, thin liquids  3. Bowel: continent, constipation as above, continue to monitor  4. Bladder: continent, PVRs at admission x3 <100, ok to monitor PRN only.    5. DVT Prophylaxis: mechanical  6. GI Prophylaxis: PPI given DAPT  7. Code: full, confirmed on admission  8. Disposition: goal for home.   9. ELOS: 3 weeks, planning for 11/24  10. Follow up Appointments on Discharge: PCP, stroke and general neurology, neurosurgery.       Patient discussed with Dr. Fredis Ordonez, PM&R staff physician       Peyton Bonds, PA-C  Physical Medicine & Rehabilitation    I spent a total of 40 minutes face-to-face or managing the care of Jose Sotelo. Over 50% of my time on the unit was spent counseling the patient and coordinating care. See note for details.

## 2021-11-24 ENCOUNTER — APPOINTMENT (OUTPATIENT)
Dept: PHYSICAL THERAPY | Facility: CLINIC | Age: 76
DRG: 056 | End: 2021-11-24
Attending: PHYSICAL MEDICINE & REHABILITATION
Payer: MEDICARE

## 2021-11-24 VITALS
BODY MASS INDEX: 27.5 KG/M2 | RESPIRATION RATE: 20 BRPM | HEIGHT: 70 IN | DIASTOLIC BLOOD PRESSURE: 89 MMHG | TEMPERATURE: 98.2 F | HEART RATE: 90 BPM | SYSTOLIC BLOOD PRESSURE: 169 MMHG | WEIGHT: 192.1 LBS | OXYGEN SATURATION: 95 %

## 2021-11-24 LAB — POTASSIUM BLD-SCNC: 3.7 MMOL/L (ref 3.4–5.3)

## 2021-11-24 PROCEDURE — 84132 ASSAY OF SERUM POTASSIUM: CPT | Performed by: PHYSICIAN ASSISTANT

## 2021-11-24 PROCEDURE — 99239 HOSP IP/OBS DSCHRG MGMT >30: CPT | Performed by: PHYSICAL MEDICINE & REHABILITATION

## 2021-11-24 PROCEDURE — 97530 THERAPEUTIC ACTIVITIES: CPT | Mod: GP | Performed by: PHYSICAL THERAPIST

## 2021-11-24 PROCEDURE — 36415 COLL VENOUS BLD VENIPUNCTURE: CPT | Performed by: PHYSICIAN ASSISTANT

## 2021-11-24 PROCEDURE — 250N000013 HC RX MED GY IP 250 OP 250 PS 637: Performed by: PHYSICIAN ASSISTANT

## 2021-11-24 RX ADMIN — PANTOPRAZOLE SODIUM 40 MG: 40 TABLET, DELAYED RELEASE ORAL at 09:04

## 2021-11-24 RX ADMIN — POTASSIUM CHLORIDE 20 MEQ: 750 TABLET, EXTENDED RELEASE ORAL at 09:05

## 2021-11-24 RX ADMIN — CLOPIDOGREL BISULFATE 75 MG: 75 TABLET, FILM COATED ORAL at 09:05

## 2021-11-24 RX ADMIN — METFORMIN HYDROCHLORIDE 1000 MG: 500 TABLET ORAL at 09:05

## 2021-11-24 RX ADMIN — ENALAPRIL MALEATE 20 MG: 5 TABLET ORAL at 09:05

## 2021-11-24 RX ADMIN — ASPIRIN 325 MG ORAL TABLET 325 MG: 325 PILL ORAL at 09:04

## 2021-11-24 ASSESSMENT — ACTIVITIES OF DAILY LIVING (ADL)
ADLS_ACUITY_SCORE: 13

## 2021-11-24 NOTE — PLAN OF CARE
FOCUS/GOAL  Discharge planning    ASSESSMENT, INTERVENTIONS AND CONTINUING PLAN FOR GOAL:  Pt discharged this shift after family training. Medications and AVS discussed with pt and family. All questions answered. No concerns with discharge. Pt discharged home with family transport and support.

## 2021-11-24 NOTE — PLAN OF CARE
Physical Therapy Discharge Summary    Reason for therapy discharge:    Discharged to home with home therapy.    Progress towards therapy goal(s). See goals on Care Plan in [facility] electronic health record for goal details.  Goals partially met.  Barriers to achieving goals:   discharge from facility.    Therapy recommendation(s):    Continued therapy is recommended.  Rationale/Recommendations:  Has progressed from 18 to 37 on [facility] balance test. Did respond well to high intensity reactive stepping training. This was discontinued during the period he had pneumonia and was not resumed. He continues to have some freezing of gait but is able to problem solve with the 4ww to be safe with this. Able to manage stairs w/ step to pattern and 2 hands on rail but does need close SBA w/ this task.

## 2021-11-24 NOTE — DISCHARGE INSTRUCTIONS
Follow up Appointments    - Primary Care  You are scheduled to see Dr. Pedro Srivastava on Wednesday, December 8th at 2:00 PM. Please call the number below with any questions regarding your appointment.     Address  [address]  Phone   [phone]  Fax                  [phone]    - Stroke neurology  Please contact one of the clinics below or a clinic recommended by your Primary Physician to schedule a stroke neurology appointment.     Address  [address] [address]  Phone             [phone]      Address   [facility]                           [address] [place] [address]    Phone:             [phone]    Fax:                  [phone]                               - General Neurology: follow up festinating gait, very slow gait, intermittent freezing, cogwheeling, difficulty initiating motor tasks  Please contact the clinic below or a clinic recommended by your Primary Physician to schedule a general neurology appointment.     Address  [address] [address]  Phone             [phone]      Address   [facility]                           [address] [place] [id number]    Phone              [phone]    Fax                  [phone]        ------------------------------------      Home Health Care:   [facility]  PH: [phone] (Linda)  Fax: [phone]  Nurse, physical therapy, occupational therapy, home health aide and     RN, PT, OT, HA and SW

## 2021-11-24 NOTE — PROGRESS NOTES
Discharge orders and updated clinicals faxed to pt HC agency. On track to discharge home today. Pt given and signed IMM earlier this week. No additional SW needs.     [facility]  PH: [phone], F: [phone]   RN, PT, OT, THOMAS and SW  (Linda)     Mabel Mckoy, [facility]   Direct Phone: [phone]  I   Pager: [phone]  I  Fax: [phone]

## 2021-11-24 NOTE — PLAN OF CARE
FOCUS/GOAL  Medication management and Safety management    ASSESSMENT, INTERVENTIONS AND CONTINUING PLAN FOR GOAL:  Pt was A/O, able to use call light and make needs known. Denies pain, SOB, chest pain nor dizziness. On consistent carb diet, thin liquids, takes medicines whole. Assist of 1 walker with transfers. Cont of BL, no BM this shift, LBM-11/21/21. VSS, BG monitored. For discharge tomorrow. Will continue with current POC.

## 2021-11-24 NOTE — PLAN OF CARE
FOCUS/GOAL  Bladder management, Pain management, Discharge planning, Mobility, Cognition/Memory/Judgment/Problem solving, and Safety management    ASSESSMENT, INTERVENTIONS AND CONTINUING PLAN FOR GOAL:  Pt is alert and oriented. Continent of bladder, voiding without difficulty using urinal at the bedside. Staff empties urinal. Turns and repositions independently. Reports sleeping well this shift. Uses call light appropriately, able to make needs known. Plan for discharge home today. Will continue with POC.

## 2021-11-27 NOTE — PLAN OF CARE
Occupational Therapy Discharge Summary    Reason for therapy discharge:    All goals and outcomes met, no further needs identified.    Progress towards therapy goal(s). See goals on Care Plan in [facility] electronic health record for goal details.  Goals partially met.  Barriers to achieving goals:   discharge from facility. Pt has plateau in skilled goals in inpatient setting.     Therapy recommendation(s):    Pt to discharge home w/ family supervision. Recommend installation of grab bar and shower chair use for showering tasks in pt's walk in shower. Pt will go home w/ mobility aide from PT, see PT noted. Son was called 11.22.21 for increased supervision at home and LB dressing needs. Pt declining any further AE to increase I w/ ADLs. Pt was in agreement with plan.

## 2022-08-04 NOTE — PROGRESS NOTES
10/31/21 1000   Signing Clinician's Name / Credentials   Signing clinician's name / credentials Rebel Green, SCOT   Pham Balance Scale (ANKIT GUPTA, GIL S, IRIS SALINAS, MACEY JORGENSEN: MEASURING BALANCE IN THE ELDERLY: VALIDATION OF AN INSTRUMENT. CAN. J. PUB. HEALTH, JULY/AUGUST SUPPLEMENT 2:S7-11, 1992.)   Sit To Stand 1   Standing Unsupported 3   Sitting Unsupported 4   Stand to Sit 1   Transfers 1   Standing with Eyes Closed 3   Standing Unsupported, Feet Together 1   Reach Forward With Outstretched Arm 1   Retrieve Object From Floor 1   Turning to Look Behind 1   Turn 360 Degrees 0   Placing Alternate Foot on Stool (4-6 inches) 0   Unsupported Tandem Stand (Demonstrate to Subject) 0   One Leg Stand 1   Total Score (A score of 45 or less has been correlated with an increased risk of falls)   Total Score (out of 56) 18     Pham Balance Scale (BBS) Cutoff Scores for CVA Population:  Patient Score: 18/56    The BBS is a measure of static and dynamic standing balance that has been validated in community dwelling elderly individuals and individuals who have Parkinson's Disease, MS, and those who are s/p CVA and TBI. The test is administered without an assistive device. Scores from the Pham are used to determine the probability of falling based on the patient's previous history of falls and their test performance.     0-20 High risk for falling- Corresponded with w/c bound status  21-40 Medium risk for falling- Able to walk with assistance  41-56 Low risk for falling- Able to walk independently  According to [facility].  Available at http://www.strokecenter.org/.  Accessibility verified April 10, 2013.  Minimal Detectable Change = 6.5 according to Jose A & Yarieley 2008    Assessment (rationale for performing, application to patient s function & care plan): PT used Pham to assess balance at initiation of treatment at [facility].  PHAM also allowed Pt to see level of deficits with balance during various tasks.   PT will use results from [facility] to address balance deficits to progress overall balance to allow for safe functional transfers and mobility.  Minutes billed as physical performance test: 25       Type Of Destruction Used: Curettage

## 2024-10-07 NOTE — PLAN OF CARE
"Discharge Planner Post-Acute Rehab PT:     Discharge Plan: Home with OP PT with discharge date anticipated 11/8    Precautions: Fall precaution    Current Status:  Bed Mobility: SBA, but inconsistent with fatigue levels  Transfer: CGA with fww to complete stand pivot transfer  Gait: AMb >150 with fww and CGA.  Increased time needed due to inconsistent gait pattern due to LLE inconsistent activation and pt attention  Stairs: 8 6\" steps with B railings and reciprocal pattern needing increased UE support for L step;  limited due to dizziness  Balance: Fair with R lateral lean especially in sitting   PASS:  22/36 at Eval 10/30  Haas 18/56  Gait Speed: .19 m/s    Assessment: Patient reports he slept poorly the night before.  Ambulates with CGA with variable performance due to attention and impaired motor control.  Denied dizziness.   Gait and transfers are inconsistent and have parkinsonian traits especially gait.     Other Barriers to Discharge (DME, Family Training, etc): None  " Arm band on/IV intact/Admission wristband placed